# Patient Record
Sex: FEMALE | Race: WHITE | NOT HISPANIC OR LATINO | Employment: FULL TIME | URBAN - METROPOLITAN AREA
[De-identification: names, ages, dates, MRNs, and addresses within clinical notes are randomized per-mention and may not be internally consistent; named-entity substitution may affect disease eponyms.]

---

## 2017-01-11 ENCOUNTER — ALLSCRIPTS OFFICE VISIT (OUTPATIENT)
Dept: OTHER | Facility: OTHER | Age: 32
End: 2017-01-11

## 2017-04-05 ENCOUNTER — ALLSCRIPTS OFFICE VISIT (OUTPATIENT)
Dept: OTHER | Facility: OTHER | Age: 32
End: 2017-04-05

## 2017-06-26 ENCOUNTER — ALLSCRIPTS OFFICE VISIT (OUTPATIENT)
Dept: OTHER | Facility: OTHER | Age: 32
End: 2017-06-26

## 2017-08-01 ENCOUNTER — ALLSCRIPTS OFFICE VISIT (OUTPATIENT)
Dept: OTHER | Facility: OTHER | Age: 32
End: 2017-08-01

## 2018-01-11 NOTE — PROGRESS NOTES
Chief Complaint  The patient presents to the office for her injection of Depo-Provera  It was given IM in the right deltoid  Ul  [place] [id number]  Lot number [id number]  Expiration date 11/2018  Active Problems    1  Depo contraception (V25 49) (Z30 40)   2  Dysmenorrhea (625 3) (N94 6)   3  Encounter for routine gynecological examination (V72 31) (Z01 419)   4  Evaluation for contraceptive injection (V25 02) (Z30 013)   5  Hyperthyroidism (242 90) (E05 90)   6  Unsatisfactory cervical Papanicolaou smear (795 08) (R87 615)    Current Meds   1  All Day Allergy CAPS Recorded   2  Buffered Vitamin C 1000 MG Oral Capsule Recorded   3  CeleXA 40 MG Oral Tablet Recorded   4  MedroxyPROGESTERone Acetate 150 MG/ML Intramuscular Suspension; INJECT   EVERY 12 WEEKS AS DIRECTED; Therapy: 49JZP4280 to (Evaluate:29Bei1892)  Requested for: 08Pxk1449; Last   Rx:[id number] Ordered   5  MedroxyPROGESTERone Acetate 150 MG/ML Intramuscular Suspension; USE AS   DIRECTED; Therapy: 05LZW8149 to (Evaluate:23Ewu8447)  Requested for: 94NBQ3471; Last   Rx:[id number] Ordered   6  Synthroid 50 MCG Oral Tablet Recorded    Allergies    1  Erythromycin TABS    Plan  SocHx: Depo contraception, Dysmenorrhea    · MedroxyPROGESTERone Acetate 150 MG/ML Intramuscular Suspension    Future Appointments    Date/Time Provider Specialty Site   07/22/2016 11:20 AM Alva Gaines DO Obstetrics/Gynecology OB [facility]     Signatures   Electronically signed by : Elma Ortiz DO;  Apr 26 2016  3:39PM EST                       (Author)

## 2018-01-11 NOTE — PROGRESS NOTES
Chief Complaint  Pt presents today for her depo injection given IM in her L deltoid  Ul  [place] [id number] Lot [id number] Exp 07/2018  Active Problems    1  Depo contraception (V25 49) (Z30 49)   2  Dysmenorrhea (625 3) (N94 6)   3  Encounter for routine gynecological examination (V72 31) (Z01 419)   4  Evaluation for contraceptive injection (V25 02) (Z30 013)   5  Hyperthyroidism (242 90) (E05 90)   6  Unsatisfactory cervical Papanicolaou smear (795 08) (R87 615)    Current Meds   1  All Day Allergy CAPS Recorded   2  Buffered Vitamin C 1000 MG Oral Capsule Recorded   3  CeleXA 40 MG Oral Tablet Recorded   4  MedroxyPROGESTERone Acetate 150 MG/ML Intramuscular Suspension; INJECT   EVERY 12 WEEKS AS DIRECTED; Therapy: 26KOE8035 to (Evaluate:52Znc6074)  Requested for: 90Jjp2789; Last   Rx:[id number] Ordered   5  MedroxyPROGESTERone Acetate 150 MG/ML Intramuscular Suspension; USE AS   DIRECTED; Therapy: 28NQV5141 to (Evaluate:94Vgo0699)  Requested for: 20SCD2432; Last   Rx:[id number] Ordered   6  Synthroid 50 MCG Oral Tablet Recorded    Allergies    1   Erythromycin TABS    Plan  SocHx: Depo contraception    · MedroxyPROGESTERone Acetate 150 MG/ML Intramuscular Suspension    Future Appointments    Date/Time Provider Specialty Site   04/26/2016 03:30 PM [facility] Nurse Schedule  [facility]   07/22/2016 11:20 AM Marielle Carroll DO Obstetrics/Gynecology [facility] [address]     Signatures   Electronically signed by : Bonifacio Ramirez DO; Feb 2 2016  3:57PM EST                       (Author)

## 2018-01-12 NOTE — PROGRESS NOTES
Chief Complaint  Patient presents for depo injection  Given IM in left deltoid  Wait time waiver signed  RTO in 12 weeks  Active Problems    1  Contraceptive use (V25 40) (Z30 40)   2  Dysmenorrhea (625 3) (N94 6)   3  Encounter for cervical Pap smear with pelvic exam (V76 2,V72 31) (Z01 419)   4  Encounter for routine gynecological examination (V72 31) (Z01 419)   5  Evaluation for contraceptive injection (V25 02) (Z30 013)   6  Hyperthyroidism (242 90) (E05 90)   7  Unsatisfactory cervical Papanicolaou smear (795 08) (R87 615)    Current Meds   1  All Day Allergy CAPS Recorded   2  Buffered Vitamin C 1000 MG Oral Capsule Recorded   3  CeleXA 40 MG Oral Tablet Recorded   4  MedroxyPROGESTERone Acetate 150 MG/ML Intramuscular Suspension; inject every 12   weeks as directed; Therapy: 24Ixg5764 to (Evaluate:33Mnz0180)  Requested for: 01Krf9947; Last   Rx:[id number] Ordered   5  MedroxyPROGESTERone Acetate 150 MG/ML Intramuscular Suspension; USE AS   DIRECTED; Therapy: 77CVO3487 to (Evaluate:57Kcv0854)  Requested for: 99IYZ8793; Last   Rx:[id number] Ordered   6  Synthroid 50 MCG Oral Tablet Recorded    Allergies    1   Erythromycin TABS    Future Appointments    Date/Time Provider Specialty Site   01/11/2017 04:00 PM [facility] Nurse Schedule  [facility]   07/28/2017 11:20 AM Massiel Davis DO Obstetrics/Gynecology [facility]     Signatures   Electronically signed by : Kendrick Munoz, ; Oct 19 2016  4:15PM EST                       (Co-author)    Electronically signed by : Boom Patiño DO; Oct 19 2016  5:00PM EST                       (Author)

## 2018-01-12 NOTE — PROGRESS NOTES
Chief Complaint  Pt  presents for her depo injection given IM in her L deltoid  Waiver signed  [place] [id number]  Exp 12/2019  Lot# [id number]  Active Problems    1  Contraceptive use (V25 40) (Z30 40)   2  Dysmenorrhea (625 3) (N94 6)   3  Encounter for cervical Pap smear with pelvic exam (V76 2,V72 31) (Z01 419)   4  Encounter for routine gynecological examination (V72 31) (Z01 419)   5  Evaluation for contraceptive injection (V25 02) (Z30 013)   6  Hyperthyroidism (242 90) (E05 90)   7  Unsatisfactory cervical Papanicolaou smear (795 08) (R87 895)    Current Meds   1  All Day Allergy CAPS Recorded   2  Buffered Vitamin C 1000 MG Oral Capsule Recorded   3  CeleXA 40 MG Oral Tablet Recorded   4  MedroxyPROGESTERone Acetate 150 MG/ML Intramuscular Suspension; inject every 12   weeks as directed; Therapy: 37HUM8924 to (Evaluate:27Apr2018)  Requested for: 88AAZ4897; Last   Rx:[id number] Ordered   5  Synthroid 50 MCG Oral Tablet Recorded    Allergies    1   Erythromycin TABS    Plan  Contraceptive use    · MedroxyPROGESTERone Acetate 150 MG/ML Intramuscular Suspension   · MedroxyPROGESTERone Acetate 150 MG/ML Intramuscular Suspension    Future Appointments    Date/Time Provider Specialty Site   09/18/2017 03:45 PM [facility] Nurse Schedule  [facility]   08/01/2017 10:00 AM Jimmy Schmidt DO Obstetrics/Gynecology [facility]     Signatures   Electronically signed by : Sophie Granda DO; Jun 27 2017 10:03AM EST                       (Author)

## 2018-01-13 VITALS
SYSTOLIC BLOOD PRESSURE: 122 MMHG | DIASTOLIC BLOOD PRESSURE: 78 MMHG | BODY MASS INDEX: 44.13 KG/M2 | WEIGHT: 274.56 LBS | HEIGHT: 66 IN

## 2018-01-13 NOTE — PROGRESS NOTES
Chief Complaint  Pt presents today for her depo injection given IM in her R deltoid  Waiver signed  [place] [id number] Lot [id number] Exp 02/2019      Active Problems    1  Contraceptive use (V25 40) (Z30 40)   2  Dysmenorrhea (625 3) (N94 6)   3  Encounter for cervical Pap smear with pelvic exam (V76 2,V72 31) (Z01 419)   4  Encounter for routine gynecological examination (V72 31) (Z01 419)   5  Evaluation for contraceptive injection (V25 02) (Z30 013)   6  Hyperthyroidism (242 90) (E05 90)   7  Unsatisfactory cervical Papanicolaou smear (795 08) (R87 615)    Current Meds   1  All Day Allergy CAPS Recorded   2  Buffered Vitamin C 1000 MG Oral Capsule Recorded   3  CeleXA 40 MG Oral Tablet Recorded   4  MedroxyPROGESTERone Acetate 150 MG/ML Intramuscular Suspension; inject every 12   weeks as directed; Therapy: 63Zwg6915 to (Evaluate:10Vwb9475)  Requested for: 88Ikq5608; Last   Rx:[id number] Ordered   5  MedroxyPROGESTERone Acetate 150 MG/ML Intramuscular Suspension; USE AS   DIRECTED; Therapy: 13JTL3732 to (Evaluate:62Qkq5544)  Requested for: 34CLE2174; Last   Rx:[id number] Ordered   6  Synthroid 50 MCG Oral Tablet Recorded    Allergies    1  Erythromycin TABS    Plan  Contraceptive use    · MedroxyPROGESTERone Acetate 150 MG/ML Intramuscular Suspension    Future Appointments    Date/Time Provider Specialty Site   06/26/2017 10:15 AM [facility], Nurse Schedule  [facility]   07/28/2017 11:20 AM Eve Lawson DO Obstetrics/Gynecology OB [facility]     Signatures   Electronically signed by : Joshua Mendoza DO;  Apr 5 2017  5:26PM EST                       (Author)

## 2018-01-17 NOTE — PROGRESS NOTES
Chief Complaint  pt presents for a depo injection  Given in her left deltoid  Ul  [place] [place] [id number] LOT [id number] EXP 07/2019      Active Problems    1  Contraceptive use (V25 40) (Z30 40)   2  Dysmenorrhea (625 3) (N94 6)   3  Encounter for cervical Pap smear with pelvic exam (V76 2,V72 31) (Z01 419)   4  Encounter for routine gynecological examination (V72 31) (Z01 419)   5  Evaluation for contraceptive injection (V25 02) (Z30 013)   6  Hyperthyroidism (242 90) (E05 90)   7  Unsatisfactory cervical Papanicolaou smear (795 08) (R87 615)    Current Meds   1  All Day Allergy CAPS Recorded   2  Buffered Vitamin C 1000 MG Oral Capsule Recorded   3  CeleXA 40 MG Oral Tablet Recorded   4  MedroxyPROGESTERone Acetate 150 MG/ML Intramuscular Suspension; inject every 12   weeks as directed; Therapy: 93Bpu4656 to (Evaluate:15Bti8587)  Requested for: 46Jqf9175; Last   Rx:95Gzq4256 Ordered   5  MedroxyPROGESTERone Acetate 150 MG/ML Intramuscular Suspension; USE AS   DIRECTED; Therapy: 77VRG6738 to (Evaluate:22Qwd6263)  Requested for: 72NHU1654; Last   Rx:27Hxg9750 Ordered   6  Synthroid 50 MCG Oral Tablet Recorded    Allergies    1   Erythromycin TABS    Plan  Contraceptive use    · MedroxyPROGESTERone Acetate 150 MG/ML Intramuscular Suspension    Future Appointments    Date/Time Provider Specialty Site   04/05/2017 03:45 PM [facility] Nurse Schedule  [facility]   07/28/2017 11:20 AM Amy Mena DO Obstetrics/Gynecology [facility]     Signatures   Electronically signed by : Tushar Mckeon DO; Jan 11 2017  6:53PM EST                       (Author)

## 2018-04-21 DIAGNOSIS — Z30.42 ENCOUNTER FOR SURVEILLANCE OF INJECTABLE CONTRACEPTIVE: Primary | ICD-10-CM

## 2018-04-24 RX ORDER — MEDROXYPROGESTERONE ACETATE 150 MG/ML
INJECTION, SUSPENSION INTRAMUSCULAR
Qty: 1 ML | Refills: 3 | Status: SHIPPED | OUTPATIENT
Start: 2018-04-24 | End: 2018-08-02 | Stop reason: SDUPTHER

## 2018-05-08 ENCOUNTER — CLINICAL SUPPORT (OUTPATIENT)
Dept: OBGYN CLINIC | Facility: MEDICAL CENTER | Age: 33
End: 2018-05-08
Payer: COMMERCIAL

## 2018-05-08 DIAGNOSIS — Z30.42 ENCOUNTER FOR SURVEILLANCE OF INJECTABLE CONTRACEPTIVE: Primary | ICD-10-CM

## 2018-05-08 PROCEDURE — 96372 THER/PROPH/DIAG INJ SC/IM: CPT | Performed by: NURSE PRACTITIONER

## 2018-05-08 RX ORDER — MEDROXYPROGESTERONE ACETATE 150 MG/ML
150 INJECTION, SUSPENSION INTRAMUSCULAR ONCE
Status: COMPLETED | OUTPATIENT
Start: 2018-05-08 | End: 2018-05-08

## 2018-05-08 RX ADMIN — MEDROXYPROGESTERONE ACETATE 150 MG: 150 INJECTION, SUSPENSION INTRAMUSCULAR at 16:15

## 2018-08-02 ENCOUNTER — ANNUAL EXAM (OUTPATIENT)
Dept: OBGYN CLINIC | Facility: MEDICAL CENTER | Age: 33
End: 2018-08-02
Payer: COMMERCIAL

## 2018-08-02 VITALS
SYSTOLIC BLOOD PRESSURE: 134 MMHG | HEIGHT: 65 IN | DIASTOLIC BLOOD PRESSURE: 80 MMHG | WEIGHT: 286 LBS | BODY MASS INDEX: 47.65 KG/M2

## 2018-08-02 DIAGNOSIS — Z30.42 ENCOUNTER FOR SURVEILLANCE OF INJECTABLE CONTRACEPTIVE: ICD-10-CM

## 2018-08-02 DIAGNOSIS — Z01.419 ENCNTR FOR GYN EXAM (GENERAL) (ROUTINE) W/O ABN FINDINGS: Primary | ICD-10-CM

## 2018-08-02 PROBLEM — F41.9 ANXIETY: Status: ACTIVE | Noted: 2018-01-10

## 2018-08-02 PROBLEM — F32.A DEPRESSION: Status: ACTIVE | Noted: 2018-01-10

## 2018-08-02 PROCEDURE — S0612 ANNUAL GYNECOLOGICAL EXAMINA: HCPCS | Performed by: NURSE PRACTITIONER

## 2018-08-02 PROCEDURE — 96372 THER/PROPH/DIAG INJ SC/IM: CPT | Performed by: NURSE PRACTITIONER

## 2018-08-02 RX ORDER — MEDROXYPROGESTERONE ACETATE 150 MG/ML
150 INJECTION, SUSPENSION INTRAMUSCULAR ONCE
Status: COMPLETED | OUTPATIENT
Start: 2018-08-02 | End: 2018-08-02

## 2018-08-02 RX ORDER — MEDROXYPROGESTERONE ACETATE 150 MG/ML
150 INJECTION, SUSPENSION INTRAMUSCULAR
Qty: 1 ML | Refills: 3 | Status: SHIPPED | OUTPATIENT
Start: 2018-08-02 | End: 2019-08-05 | Stop reason: SDUPTHER

## 2018-08-02 RX ADMIN — MEDROXYPROGESTERONE ACETATE 150 MG: 150 INJECTION, SUSPENSION INTRAMUSCULAR at 14:48

## 2018-08-02 NOTE — PROGRESS NOTES
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  Skipper Natter with normal gynecologic exam     1   Routine well woman exam done today  2  Pap and HPV:  The patient's last pap and hpv was   It was normal     Pap and cotesting was not done today  Current ASCCP Guidelines reviewed  3   The following were reviewed in today's visit: breast self exam, family planning choices, adequate intake of calcium and vitamin D, exercise and healthy diet  4  rx for depo provera sent to the pharmacy for the year, Pt  Had injection today  CC:  Annual Gynecologic Examination    HPI: Joretta Schilder is a 29 y o  Skipper Natter who presents for annual gynecologic examination  She has the following concerns: none  Very happy on depo x's 5 years, wants to continue  Amenorrheic on it  Health Maintenance:    She wears her seatbelt routinely  She does not perform regular monthly self breast exams  She feels safe at home  History reviewed  No pertinent past medical history  Past Surgical History:   Procedure Laterality Date    BREAST SURGERY         Past OB/Gyn History:  OB History      Para Term  AB Living    0 0 0 0 0 0    SAB TAB Ectopic Multiple Live Births    0 0 0 0 0        Pt does not have menstrual issues  History of sexually transmitted infection: No   History of abnormal pap smears: No      Patient is currently sexually active  heterosexual   The current method of family planning is Depo-Provera injections  Family History   Problem Relation Age of Onset    No Known Problems Mother     Ovarian cancer Maternal Grandmother        Social History:  Social History     Social History    Marital status: /Civil Union     Spouse name: N/A    Number of children: N/A    Years of education: N/A     Occupational History    Not on file       Social History Main Topics    Smoking status: Never Smoker    Smokeless tobacco: Never Used    Alcohol use No    Drug use: No    Sexual activity: Yes     Birth control/ protection: Injection      Comment: contraceptive use     Other Topics Concern    Not on file     Social History Narrative    No narrative on file     Presently lives with spouse  Patient is   Patient is currently employed     Allergies   Allergen Reactions    Erythromycin Rash         Current Outpatient Prescriptions:     Ascorbic Acid Buffered (BUFFERED VITAMIN C) 1000 MG CAPS, Take by mouth, Disp: , Rfl:     Cetirizine HCl 10 MG CAPS, Take by mouth, Disp: , Rfl:     Cholecalciferol (VITAMIN D3) 27864 units CAPS, take 1 capsule by oral route  every week, Disp: , Rfl:     citalopram (CELEXA) 40 mg tablet, Take by mouth, Disp: , Rfl:     levothyroxine 137 mcg tablet, every 24 hours, Disp: , Rfl:     medroxyPROGESTERone (DEPO-PROVERA) 150 mg/mL injection, INJECT EVERY 12 WEEKS AS DIRECTED, Disp: 1 mL, Rfl: 3    Multiple Vitamins-Minerals (MULTIVITAMIN ADULTS PO), Take 1 tablet by mouth, Disp: , Rfl:   No current facility-administered medications for this visit  Review of Systems  Constitutional :no fever, feels well, no tiredness, no recent weight gain or loss  ENT: no ear ache, no loss of hearing, no nosebleeds or nasal discharge, no sore throat or hoarseness  Cardiovascular: no complaints of slow or fast heart beat, no chest pain, no palpitations, no leg claudication or lower extremity edema  Respiratory: no complaints of shortness of shortness of breath, no SANCHEZ  Breasts:no complaints of breast pain, breast lump, or nipple discharge  Gastrointestinal: no complaints of abdominal pain, constipation, nausea, vomiting, or diarrhea or bloody stools  Genitourinary : no complaints of dysuria, incontinence, pelvic pain, no dysmenorrhea, vaginal discharge or abnormal vaginal bleeding and as noted in HPI  Musculoskeletal: no complaints of arthralgia, no myalgia, no joint swelling or stiffness, no limb pain or swelling    Integumentary: no complaints of skin rash or lesion, itching or dry skin  Neurological: no complaints of headache, no confusion, no numbness or tingling, no dizziness or fainting    Objective      /80   Ht 5' 5" (1 651 m)   Wt 130 kg (286 lb)   BMI 47 59 kg/m²   General:   appears stated age, cooperative, alert normal mood and affect   Neck: normal, supple,trachea midline, no masses   Heart: regular rate and rhythm, S1, S2 normal, no murmur, click, rub or gallop   Lungs: clear to auscultation bilaterally   Breasts: normal appearance, no masses or tenderness   Abdomen: soft, non-tender, without masses or organomegaly   Vulva: normal   Vagina: normal vagina   Urethra: normal   Cervix: Normal, no discharge  Uterus: normal size, contour, position, consistency, mobility, non-tender   Adnexa: normal adnexa   Lymphatic palpation of lymph nodes in neck, axilla, groin and/or other locations: no lymphadenopathy or masses noted   Skin normal skin turgor and no rashes     Psychiatric orientation to person, place, and time: normal  mood and affect: normal

## 2018-08-03 DIAGNOSIS — E03.9 HYPOTHYROIDISM, UNSPECIFIED TYPE: ICD-10-CM

## 2018-08-03 DIAGNOSIS — F41.9 ANXIETY: Primary | ICD-10-CM

## 2018-08-03 RX ORDER — LEVOTHYROXINE SODIUM 137 UG/1
137 TABLET ORAL EVERY 24 HOURS
Qty: 90 TABLET | Refills: 1 | Status: SHIPPED | OUTPATIENT
Start: 2018-08-03 | End: 2019-01-03 | Stop reason: SDUPTHER

## 2018-08-03 RX ORDER — CITALOPRAM 40 MG/1
40 TABLET ORAL DAILY
Qty: 90 TABLET | Refills: 1 | Status: CANCELLED | OUTPATIENT
Start: 2018-08-03

## 2018-08-08 DIAGNOSIS — F32.A DEPRESSION, UNSPECIFIED DEPRESSION TYPE: Primary | ICD-10-CM

## 2018-08-08 RX ORDER — CITALOPRAM 40 MG/1
40 TABLET ORAL DAILY
Qty: 30 TABLET | Refills: 0 | Status: SHIPPED | OUTPATIENT
Start: 2018-08-08 | End: 2018-08-17 | Stop reason: SDUPTHER

## 2018-08-17 ENCOUNTER — APPOINTMENT (OUTPATIENT)
Dept: RADIOLOGY | Age: 33
End: 2018-08-17
Payer: COMMERCIAL

## 2018-08-17 ENCOUNTER — TRANSCRIBE ORDERS (OUTPATIENT)
Dept: ADMINISTRATIVE | Age: 33
End: 2018-08-17

## 2018-08-17 DIAGNOSIS — Z92.89 HISTORY OF POSITIVE PPD: Primary | ICD-10-CM

## 2018-08-17 DIAGNOSIS — Z92.89 HISTORY OF POSITIVE PPD: ICD-10-CM

## 2018-08-17 DIAGNOSIS — F32.A DEPRESSION, UNSPECIFIED DEPRESSION TYPE: ICD-10-CM

## 2018-08-17 DIAGNOSIS — Z22.7 INACTIVE TUBERCULOSIS: Primary | ICD-10-CM

## 2018-08-17 PROBLEM — E03.9 HYPOTHYROIDISM: Status: ACTIVE | Noted: 2018-01-10

## 2018-08-17 PROBLEM — E66.9 OBESITY: Status: ACTIVE | Noted: 2018-01-10

## 2018-08-17 PROCEDURE — 71046 X-RAY EXAM CHEST 2 VIEWS: CPT

## 2018-08-17 RX ORDER — CITALOPRAM 40 MG/1
40 TABLET ORAL DAILY
Qty: 90 TABLET | Refills: 0 | Status: SHIPPED | OUTPATIENT
Start: 2018-08-17 | End: 2018-10-31 | Stop reason: SDUPTHER

## 2018-10-25 ENCOUNTER — CLINICAL SUPPORT (OUTPATIENT)
Dept: OBGYN CLINIC | Facility: MEDICAL CENTER | Age: 33
End: 2018-10-25
Payer: COMMERCIAL

## 2018-10-25 DIAGNOSIS — Z30.42 ENCOUNTER FOR SURVEILLANCE OF INJECTABLE CONTRACEPTIVE: Primary | ICD-10-CM

## 2018-10-25 PROCEDURE — 96372 THER/PROPH/DIAG INJ SC/IM: CPT | Performed by: OBSTETRICS & GYNECOLOGY

## 2018-10-25 RX ORDER — MEDROXYPROGESTERONE ACETATE 150 MG/ML
150 INJECTION, SUSPENSION INTRAMUSCULAR ONCE
Status: COMPLETED | OUTPATIENT
Start: 2018-10-25 | End: 2018-10-25

## 2018-10-25 RX ADMIN — MEDROXYPROGESTERONE ACETATE 150 MG: 150 INJECTION, SUSPENSION INTRAMUSCULAR at 16:20

## 2018-10-25 NOTE — PROGRESS NOTES
Pt presents for depo shot  Administered in R deltoid  Waiver signed  Pt supplied         [address] [id number]  EXP 02/2021

## 2018-10-31 DIAGNOSIS — F32.A DEPRESSION, UNSPECIFIED DEPRESSION TYPE: ICD-10-CM

## 2018-10-31 RX ORDER — CITALOPRAM 40 MG/1
TABLET ORAL
Qty: 90 TABLET | Refills: 1 | Status: SHIPPED | OUTPATIENT
Start: 2018-10-31 | End: 2019-04-29 | Stop reason: SDUPTHER

## 2019-01-03 DIAGNOSIS — E03.9 HYPOTHYROIDISM, UNSPECIFIED TYPE: ICD-10-CM

## 2019-01-03 RX ORDER — LEVOTHYROXINE SODIUM 137 UG/1
TABLET ORAL
Qty: 90 TABLET | Refills: 0 | Status: SHIPPED | OUTPATIENT
Start: 2019-01-03 | End: 2019-04-03 | Stop reason: SDUPTHER

## 2019-01-16 RX ORDER — MEDROXYPROGESTERONE ACETATE 150 MG/ML
150 INJECTION, SUSPENSION INTRAMUSCULAR
Status: CANCELLED | OUTPATIENT
Start: 2019-01-21 | End: 2019-07-19

## 2019-01-21 ENCOUNTER — CLINICAL SUPPORT (OUTPATIENT)
Dept: OBGYN CLINIC | Facility: MEDICAL CENTER | Age: 34
End: 2019-01-21
Payer: COMMERCIAL

## 2019-01-21 DIAGNOSIS — Z30.42 ENCOUNTER FOR DEPO-PROVERA CONTRACEPTION: Primary | ICD-10-CM

## 2019-01-21 PROCEDURE — 96372 THER/PROPH/DIAG INJ SC/IM: CPT | Performed by: OBSTETRICS & GYNECOLOGY

## 2019-01-21 RX ORDER — MEDROXYPROGESTERONE ACETATE 150 MG/ML
150 INJECTION, SUSPENSION INTRAMUSCULAR
Status: COMPLETED | OUTPATIENT
Start: 2019-01-21 | End: 2019-04-19

## 2019-01-21 RX ADMIN — MEDROXYPROGESTERONE ACETATE 150 MG: 150 INJECTION, SUSPENSION INTRAMUSCULAR at 12:00

## 2019-01-21 NOTE — PROGRESS NOTES
Here for depo  Patient supplied    Waiver signed  NDC# [address]-7  Lot# [id number]  Exp 5/2021  Given Im in left deltoid

## 2019-04-03 DIAGNOSIS — E03.9 HYPOTHYROIDISM, UNSPECIFIED TYPE: ICD-10-CM

## 2019-04-03 RX ORDER — LEVOTHYROXINE SODIUM 137 UG/1
TABLET ORAL
Qty: 90 TABLET | Refills: 0 | Status: SHIPPED | OUTPATIENT
Start: 2019-04-03 | End: 2019-06-18 | Stop reason: SDUPTHER

## 2019-04-19 ENCOUNTER — CLINICAL SUPPORT (OUTPATIENT)
Dept: OBGYN CLINIC | Facility: MEDICAL CENTER | Age: 34
End: 2019-04-19
Payer: COMMERCIAL

## 2019-04-19 DIAGNOSIS — Z30.42 ENCOUNTER FOR SURVEILLANCE OF INJECTABLE CONTRACEPTIVE: ICD-10-CM

## 2019-04-19 PROCEDURE — 96372 THER/PROPH/DIAG INJ SC/IM: CPT | Performed by: OBSTETRICS & GYNECOLOGY

## 2019-04-19 RX ADMIN — MEDROXYPROGESTERONE ACETATE 150 MG: 150 INJECTION, SUSPENSION INTRAMUSCULAR at 11:59

## 2019-04-29 DIAGNOSIS — F32.A DEPRESSION, UNSPECIFIED DEPRESSION TYPE: ICD-10-CM

## 2019-04-30 RX ORDER — CITALOPRAM 40 MG/1
TABLET ORAL
Qty: 90 TABLET | Refills: 0 | Status: SHIPPED | OUTPATIENT
Start: 2019-04-30 | End: 2019-06-18 | Stop reason: SDUPTHER

## 2019-06-10 ENCOUNTER — TELEPHONE (OUTPATIENT)
Dept: FAMILY MEDICINE CLINIC | Facility: CLINIC | Age: 34
End: 2019-06-10

## 2019-06-10 DIAGNOSIS — Z00.00 WELL ADULT EXAM: ICD-10-CM

## 2019-06-10 DIAGNOSIS — E03.9 HYPOTHYROIDISM, UNSPECIFIED TYPE: Primary | ICD-10-CM

## 2019-06-10 DIAGNOSIS — J30.2 SEASONAL ALLERGIES: ICD-10-CM

## 2019-06-11 ENCOUNTER — TELEPHONE (OUTPATIENT)
Dept: FAMILY MEDICINE CLINIC | Facility: CLINIC | Age: 34
End: 2019-06-11

## 2019-06-11 DIAGNOSIS — J30.2 SEASONAL ALLERGIES: ICD-10-CM

## 2019-06-11 RX ORDER — LEVOCETIRIZINE DIHYDROCHLORIDE 5 MG/1
5 TABLET, FILM COATED ORAL EVERY EVENING
Qty: 30 TABLET | Refills: 5 | Status: SHIPPED | OUTPATIENT
Start: 2019-06-11 | End: 2019-08-05 | Stop reason: ALTCHOICE

## 2019-06-11 RX ORDER — LEVOCETIRIZINE DIHYDROCHLORIDE 5 MG/1
5 TABLET, FILM COATED ORAL EVERY EVENING
Qty: 30 TABLET | Refills: 5 | Status: SHIPPED | OUTPATIENT
Start: 2019-06-11 | End: 2019-06-11 | Stop reason: SDUPTHER

## 2019-06-12 ENCOUNTER — APPOINTMENT (OUTPATIENT)
Dept: LAB | Facility: OTHER | Age: 34
End: 2019-06-12
Payer: COMMERCIAL

## 2019-06-12 ENCOUNTER — TRANSCRIBE ORDERS (OUTPATIENT)
Dept: LAB | Facility: OTHER | Age: 34
End: 2019-06-12

## 2019-06-12 DIAGNOSIS — E03.9 HYPOTHYROIDISM, UNSPECIFIED TYPE: ICD-10-CM

## 2019-06-12 DIAGNOSIS — Z00.00 WELL ADULT EXAM: ICD-10-CM

## 2019-06-12 LAB
ALBUMIN SERPL BCP-MCNC: 3.9 G/DL (ref 3.5–5)
ALP SERPL-CCNC: 89 U/L (ref 46–116)
ALT SERPL W P-5'-P-CCNC: 24 U/L (ref 12–78)
ANION GAP SERPL CALCULATED.3IONS-SCNC: 7 MMOL/L (ref 4–13)
AST SERPL W P-5'-P-CCNC: 11 U/L (ref 5–45)
BASOPHILS # BLD AUTO: 0.07 THOUSANDS/ΜL (ref 0–0.1)
BASOPHILS NFR BLD AUTO: 1 % (ref 0–1)
BILIRUB SERPL-MCNC: 0.51 MG/DL (ref 0.2–1)
BUN SERPL-MCNC: 16 MG/DL (ref 5–25)
CALCIUM SERPL-MCNC: 9.5 MG/DL (ref 8.3–10.1)
CHLORIDE SERPL-SCNC: 106 MMOL/L (ref 100–108)
CHOLEST SERPL-MCNC: 206 MG/DL (ref 50–200)
CO2 SERPL-SCNC: 24 MMOL/L (ref 21–32)
CREAT SERPL-MCNC: 0.89 MG/DL (ref 0.6–1.3)
EOSINOPHIL # BLD AUTO: 0.28 THOUSAND/ΜL (ref 0–0.61)
EOSINOPHIL NFR BLD AUTO: 3 % (ref 0–6)
ERYTHROCYTE [DISTWIDTH] IN BLOOD BY AUTOMATED COUNT: 12.9 % (ref 11.6–15.1)
GFR SERPL CREATININE-BSD FRML MDRD: 85 ML/MIN/1.73SQ M
GLUCOSE P FAST SERPL-MCNC: 92 MG/DL (ref 65–99)
HCT VFR BLD AUTO: 44.1 % (ref 34.8–46.1)
HDLC SERPL-MCNC: 34 MG/DL (ref 40–60)
HGB BLD-MCNC: 14.7 G/DL (ref 11.5–15.4)
IMM GRANULOCYTES # BLD AUTO: 0.04 THOUSAND/UL (ref 0–0.2)
IMM GRANULOCYTES NFR BLD AUTO: 1 % (ref 0–2)
LDLC SERPL CALC-MCNC: 132 MG/DL (ref 0–100)
LYMPHOCYTES # BLD AUTO: 2.97 THOUSANDS/ΜL (ref 0.6–4.47)
LYMPHOCYTES NFR BLD AUTO: 34 % (ref 14–44)
MCH RBC QN AUTO: 29.3 PG (ref 26.8–34.3)
MCHC RBC AUTO-ENTMCNC: 33.3 G/DL (ref 31.4–37.4)
MCV RBC AUTO: 88 FL (ref 82–98)
MONOCYTES # BLD AUTO: 0.43 THOUSAND/ΜL (ref 0.17–1.22)
MONOCYTES NFR BLD AUTO: 5 % (ref 4–12)
NEUTROPHILS # BLD AUTO: 4.97 THOUSANDS/ΜL (ref 1.85–7.62)
NEUTS SEG NFR BLD AUTO: 56 % (ref 43–75)
NONHDLC SERPL-MCNC: 172 MG/DL
NRBC BLD AUTO-RTO: 0 /100 WBCS
PLATELET # BLD AUTO: 380 THOUSANDS/UL (ref 149–390)
PMV BLD AUTO: 10.9 FL (ref 8.9–12.7)
POTASSIUM SERPL-SCNC: 4 MMOL/L (ref 3.5–5.3)
PROT SERPL-MCNC: 7.8 G/DL (ref 6.4–8.2)
RBC # BLD AUTO: 5.02 MILLION/UL (ref 3.81–5.12)
SODIUM SERPL-SCNC: 137 MMOL/L (ref 136–145)
TRIGL SERPL-MCNC: 201 MG/DL
TSH SERPL DL<=0.05 MIU/L-ACNC: 1.8 UIU/ML (ref 0.36–3.74)
WBC # BLD AUTO: 8.76 THOUSAND/UL (ref 4.31–10.16)

## 2019-06-12 PROCEDURE — 80053 COMPREHEN METABOLIC PANEL: CPT

## 2019-06-12 PROCEDURE — 84443 ASSAY THYROID STIM HORMONE: CPT

## 2019-06-12 PROCEDURE — 85025 COMPLETE CBC W/AUTO DIFF WBC: CPT

## 2019-06-12 PROCEDURE — 36415 COLL VENOUS BLD VENIPUNCTURE: CPT

## 2019-06-12 PROCEDURE — 80061 LIPID PANEL: CPT

## 2019-06-17 ENCOUNTER — TELEPHONE (OUTPATIENT)
Dept: FAMILY MEDICINE CLINIC | Facility: CLINIC | Age: 34
End: 2019-06-17

## 2019-06-18 ENCOUNTER — OFFICE VISIT (OUTPATIENT)
Dept: FAMILY MEDICINE CLINIC | Facility: CLINIC | Age: 34
End: 2019-06-18
Payer: COMMERCIAL

## 2019-06-18 VITALS
HEIGHT: 66 IN | WEIGHT: 293 LBS | BODY MASS INDEX: 47.09 KG/M2 | DIASTOLIC BLOOD PRESSURE: 90 MMHG | RESPIRATION RATE: 18 BRPM | SYSTOLIC BLOOD PRESSURE: 110 MMHG | TEMPERATURE: 98.4 F | HEART RATE: 60 BPM

## 2019-06-18 DIAGNOSIS — J30.2 SEASONAL ALLERGIES: Primary | ICD-10-CM

## 2019-06-18 DIAGNOSIS — E66.01 CLASS 3 SEVERE OBESITY DUE TO EXCESS CALORIES WITHOUT SERIOUS COMORBIDITY WITH BODY MASS INDEX (BMI) OF 45.0 TO 49.9 IN ADULT (HCC): ICD-10-CM

## 2019-06-18 DIAGNOSIS — F32.A DEPRESSION, UNSPECIFIED DEPRESSION TYPE: ICD-10-CM

## 2019-06-18 DIAGNOSIS — E03.9 HYPOTHYROIDISM, UNSPECIFIED TYPE: ICD-10-CM

## 2019-06-18 DIAGNOSIS — Z23 NEED FOR VACCINATION: ICD-10-CM

## 2019-06-18 PROCEDURE — 3008F BODY MASS INDEX DOCD: CPT | Performed by: NURSE PRACTITIONER

## 2019-06-18 PROCEDURE — 1036F TOBACCO NON-USER: CPT | Performed by: NURSE PRACTITIONER

## 2019-06-18 PROCEDURE — 99213 OFFICE O/P EST LOW 20 MIN: CPT | Performed by: NURSE PRACTITIONER

## 2019-06-18 RX ORDER — FLUTICASONE PROPIONATE 50 MCG
2 SPRAY, SUSPENSION (ML) NASAL DAILY
Qty: 1 BOTTLE | Refills: 3 | Status: SHIPPED | OUTPATIENT
Start: 2019-06-18 | End: 2020-05-12 | Stop reason: ALTCHOICE

## 2019-06-18 RX ORDER — LEVOTHYROXINE SODIUM 137 UG/1
137 TABLET ORAL DAILY
Qty: 90 TABLET | Refills: 1 | Status: SHIPPED | OUTPATIENT
Start: 2019-06-18 | End: 2019-06-18 | Stop reason: SDUPTHER

## 2019-06-18 RX ORDER — MONTELUKAST SODIUM 10 MG/1
10 TABLET ORAL
Qty: 90 TABLET | Refills: 3 | Status: SHIPPED | OUTPATIENT
Start: 2019-06-18 | End: 2020-06-23

## 2019-06-18 RX ORDER — CITALOPRAM 40 MG/1
40 TABLET ORAL DAILY
Qty: 90 TABLET | Refills: 3 | Status: SHIPPED | OUTPATIENT
Start: 2019-06-18 | End: 2020-01-24 | Stop reason: SDUPTHER

## 2019-06-18 RX ORDER — MONTELUKAST SODIUM 10 MG/1
10 TABLET ORAL
Qty: 90 TABLET | Refills: 3 | Status: SHIPPED | OUTPATIENT
Start: 2019-06-18 | End: 2019-06-18 | Stop reason: SDUPTHER

## 2019-06-18 RX ORDER — LEVOTHYROXINE SODIUM 137 UG/1
137 TABLET ORAL DAILY
Qty: 90 TABLET | Refills: 3 | Status: SHIPPED | OUTPATIENT
Start: 2019-06-18 | End: 2019-07-02 | Stop reason: SDUPTHER

## 2019-06-18 RX ORDER — CITALOPRAM 40 MG/1
40 TABLET ORAL DAILY
Qty: 90 TABLET | Refills: 0 | Status: SHIPPED | OUTPATIENT
Start: 2019-06-18 | End: 2019-06-18 | Stop reason: SDUPTHER

## 2019-06-18 RX ORDER — UREA 40 %
CREAM (GRAM) TOPICAL
COMMUNITY
End: 2019-06-18 | Stop reason: ALTCHOICE

## 2019-06-18 RX ORDER — FLUTICASONE PROPIONATE 50 MCG
2 SPRAY, SUSPENSION (ML) NASAL DAILY
Qty: 1 BOTTLE | Refills: 3 | Status: SHIPPED | OUTPATIENT
Start: 2019-06-18 | End: 2019-06-18 | Stop reason: SDUPTHER

## 2019-07-02 DIAGNOSIS — E03.9 HYPOTHYROIDISM, UNSPECIFIED TYPE: ICD-10-CM

## 2019-07-02 RX ORDER — LEVOTHYROXINE SODIUM 137 UG/1
TABLET ORAL
Qty: 90 TABLET | Refills: 0 | Status: SHIPPED | OUTPATIENT
Start: 2019-07-02 | End: 2020-01-24 | Stop reason: SDUPTHER

## 2019-07-03 RX ORDER — MEDROXYPROGESTERONE ACETATE 150 MG/ML
150 INJECTION, SUSPENSION INTRAMUSCULAR
Status: CANCELLED | OUTPATIENT
Start: 2019-07-08 | End: 2020-01-03

## 2019-07-08 ENCOUNTER — CLINICAL SUPPORT (OUTPATIENT)
Dept: OBGYN CLINIC | Facility: MEDICAL CENTER | Age: 34
End: 2019-07-08
Payer: COMMERCIAL

## 2019-07-08 DIAGNOSIS — Z30.42 ENCOUNTER FOR SURVEILLANCE OF INJECTABLE CONTRACEPTIVE: Primary | ICD-10-CM

## 2019-07-08 PROCEDURE — 96372 THER/PROPH/DIAG INJ SC/IM: CPT | Performed by: OBSTETRICS & GYNECOLOGY

## 2019-07-08 RX ORDER — MEDROXYPROGESTERONE ACETATE 150 MG/ML
150 INJECTION, SUSPENSION INTRAMUSCULAR
Status: COMPLETED | OUTPATIENT
Start: 2019-07-08 | End: 2019-10-01

## 2019-07-08 RX ADMIN — MEDROXYPROGESTERONE ACETATE 150 MG: 150 INJECTION, SUSPENSION INTRAMUSCULAR at 11:04

## 2019-07-08 NOTE — PROGRESS NOTES
Here for depo  Patient supplied  Waiver signed    Given IM in right deltoid  NDC# [address]-4  EV#[id number]  Exp 07/2021

## 2019-08-05 ENCOUNTER — ANNUAL EXAM (OUTPATIENT)
Dept: OBGYN CLINIC | Facility: MEDICAL CENTER | Age: 34
End: 2019-08-05
Payer: COMMERCIAL

## 2019-08-05 VITALS
BODY MASS INDEX: 47.48 KG/M2 | WEIGHT: 285 LBS | SYSTOLIC BLOOD PRESSURE: 134 MMHG | DIASTOLIC BLOOD PRESSURE: 88 MMHG | HEIGHT: 65 IN

## 2019-08-05 DIAGNOSIS — Z30.42 ENCOUNTER FOR SURVEILLANCE OF INJECTABLE CONTRACEPTIVE: Primary | ICD-10-CM

## 2019-08-05 DIAGNOSIS — Z01.419 ENCNTR FOR GYN EXAM (GENERAL) (ROUTINE) W/O ABN FINDINGS: ICD-10-CM

## 2019-08-05 PROCEDURE — S0612 ANNUAL GYNECOLOGICAL EXAMINA: HCPCS | Performed by: NURSE PRACTITIONER

## 2019-08-05 RX ORDER — MEDROXYPROGESTERONE ACETATE 150 MG/ML
150 INJECTION, SUSPENSION INTRAMUSCULAR
Qty: 1 ML | Refills: 3 | Status: SHIPPED | OUTPATIENT
Start: 2019-08-05 | End: 2020-09-24

## 2019-08-05 NOTE — PROGRESS NOTES
ASSESSMENT & PLAN: Servando Liriano is a 36 y o  [place] with normal gynecologic exam     1   Routine well woman exam done today  2  Pap and HPV:  The patient's last pap and hpv was   It was normal     Pap and cotesting was not done today  Current ASCCP Guidelines reviewed  3   The following were reviewed in today's visit: breast self exam, family planning choices, adequate intake of calcium and vitamin D, exercise and healthy diet  4  rx for depo for the year sent to pharmacy  RTO one year for annual exam      CC:  Annual Gynecologic Examination    HPI: Servando Liriano is a 36 y o  [place] who presents for annual gynecologic examination  She has the following concerns:  None, very happy on depo, no bleeding and wants to continue same  States is on the [employer] and has lost 8# in 2 weeks, also takes long walks with her dogs  No pregnancy plans in foreseeable future  Advised should go off depo 1 year in advance of trying to conceive and use other quickly reversible method  Health Maintenance:    She wears her seatbelt routinely  She does not perform regular monthly self breast exams  She feels safe at home  History reviewed  No pertinent past medical history  Past Surgical History:   Procedure Laterality Date    BREAST SURGERY      reduction    TONSILLECTOMY         Past OB/Gyn History:  OB History        0    Para   0    Term   0       0    AB   0    Living   0       SAB   0    TAB   0    Ectopic   0    Multiple   0    Live Births   0               Pt does not have menstrual issues  History of sexually transmitted infection: No   History of abnormal pap smears: No      Patient is currently sexually active  heterosexual   The current method of family planning is Depo-Provera injections      Family History   Problem Relation Age of Onset    No Known Problems Mother     Ovarian cancer Maternal Grandmother        Social History:  Social History     Socioeconomic History    Marital status: /Civil Union     Spouse name: Not on file    Number of children: Not on file    Years of education: Not on file    Highest education level: Not on file   Occupational History    Not on file   Social Needs    Financial resource strain: Not on file    Food insecurity:     Worry: Not on file     Inability: Not on file    Transportation needs:     Medical: Not on file     Non-medical: Not on file   Tobacco Use    Smoking status: Never Smoker    Smokeless tobacco: Never Used   Substance and Sexual Activity    Alcohol use: Yes     Frequency: Monthly or less    Drug use: No    Sexual activity: Yes     Partners: Male     Birth control/protection: Injection     Comment:    Lifestyle    Physical activity:     Days per week: Not on file     Minutes per session: Not on file    Stress: Not on file   Relationships    Social connections:     Talks on phone: Not on file     Gets together: Not on file     Attends Mosque service: Not on file     Active member of club or organization: Not on file     Attends meetings of clubs or organizations: Not on file     Relationship status: Not on file    Intimate partner violence:     Fear of current or ex partner: Not on file     Emotionally abused: Not on file     Physically abused: Not on file     Forced sexual activity: Not on file   Other Topics Concern    Not on file   Social History Narrative    Not on file     Presently lives with spouse  Patient is   Patient is currently employed as a , trying to get a full time position      Allergies   Allergen Reactions    Erythromycin Rash         Current Outpatient Medications:     citalopram (CeleXA) 40 mg tablet, Take 1 tablet (40 mg total) by mouth daily, Disp: 90 tablet, Rfl: 3    fluticasone (FLONASE) 50 mcg/act nasal spray, 2 sprays into each nostril daily, Disp: 1 Bottle, Rfl: 3    levothyroxine 137 mcg tablet, TAKE 1 TABLET EVERY 24 HOURS, Disp: 90 tablet, Rfl: 0    medroxyPROGESTERone (DEPO-PROVERA) 150 mg/mL injection, Inject 1 mL (150 mg total) into a muscle every 3 (three) months As directed, Disp: 1 mL, Rfl: 3    montelukast (SINGULAIR) 10 mg tablet, Take 1 tablet (10 mg total) by mouth daily at bedtime, Disp: 90 tablet, Rfl: 3    Cholecalciferol (VITAMIN D3) 08914 units CAPS, take 1 capsule by oral route  every week, Disp: , Rfl:     Current Facility-Administered Medications:     medroxyPROGESTERone (DEPO-PROVERA) IM injection 150 mg, 150 mg, Intramuscular, Q3 Months, Lobo Lang MD, 150 mg at 07/08/19 1104      Review of Systems  Constitutional :no fever, feels well, no tiredness, no recent weight gain or loss  ENT: no ear ache, no loss of hearing, no nosebleeds or nasal discharge, no sore throat or hoarseness  Cardiovascular: no complaints of slow or fast heart beat, no chest pain, no palpitations, no leg claudication or lower extremity edema  Respiratory: no complaints of shortness of shortness of breath, no SANCHEZ  Breasts:no complaints of breast pain, breast lump, or nipple discharge  Gastrointestinal: no complaints of abdominal pain, constipation, nausea, vomiting, or diarrhea or bloody stools  Genitourinary : no complaints of dysuria, incontinence, pelvic pain, no dysmenorrhea, vaginal discharge or abnormal vaginal bleeding and as noted in HPI  Musculoskeletal: no complaints of arthralgia, no myalgia, no joint swelling or stiffness, no limb pain or swelling    Integumentary: no complaints of skin rash or lesion, itching or dry skin  Neurological: no complaints of headache, no confusion, no numbness or tingling, no dizziness or fainting    Objective      /88   Ht 5' 5" (1 651 m)   Wt 129 kg (285 lb)   BMI 47 43 kg/m²   General:   appears stated age, cooperative, alert normal mood and affect   Neck: normal, supple,trachea midline, no masses   Heart: regular rate and rhythm, S1, S2 normal, no murmur, click, rub or gallop   Lungs: clear to auscultation bilaterally   Breasts: normal appearance, no masses or tenderness s/p breast reduction surgery  Abdomen: soft, non-tender, without masses or organomegaly   Vulva: normal female genitalia   Vagina: normal vagina   Urethra: normal   Cervix: Normal, no discharge  Uterus: normal size, contour, position, consistency, mobility, non-tender   Adnexa: no mass, fullness, tenderness   Lymphatic palpation of lymph nodes in neck, axilla, groin and/or other locations: no lymphadenopathy or masses noted   Skin normal skin turgor and no rashes     Psychiatric orientation to person, place, and time: normal  mood and affect: normal

## 2019-09-06 DIAGNOSIS — Z30.42 ENCOUNTER FOR SURVEILLANCE OF INJECTABLE CONTRACEPTIVE: ICD-10-CM

## 2019-09-06 RX ORDER — MEDROXYPROGESTERONE ACETATE 150 MG/ML
INJECTION, SUSPENSION INTRAMUSCULAR
Qty: 1 ML | Refills: 3 | Status: SHIPPED | OUTPATIENT
Start: 2019-09-06 | End: 2020-03-03 | Stop reason: SDUPTHER

## 2019-10-01 ENCOUNTER — CLINICAL SUPPORT (OUTPATIENT)
Dept: OBGYN CLINIC | Facility: MEDICAL CENTER | Age: 34
End: 2019-10-01
Payer: COMMERCIAL

## 2019-10-01 DIAGNOSIS — Z30.42 ENCOUNTER FOR SURVEILLANCE OF INJECTABLE CONTRACEPTIVE: ICD-10-CM

## 2019-10-01 PROCEDURE — 96372 THER/PROPH/DIAG INJ SC/IM: CPT | Performed by: OBSTETRICS & GYNECOLOGY

## 2019-10-01 RX ADMIN — MEDROXYPROGESTERONE ACETATE 150 MG: 150 INJECTION, SUSPENSION INTRAMUSCULAR at 16:11

## 2019-10-01 NOTE — PROGRESS NOTES
Here for depo    Patient supplied  Given IM in left deltoid  Waiver signed  NDC# [address]-2  Lot# [id number]  Exp 8/2021

## 2019-10-07 ENCOUNTER — OFFICE VISIT (OUTPATIENT)
Dept: FAMILY MEDICINE CLINIC | Facility: CLINIC | Age: 34
End: 2019-10-07
Payer: COMMERCIAL

## 2019-10-07 VITALS
TEMPERATURE: 97.5 F | HEIGHT: 66 IN | BODY MASS INDEX: 46.27 KG/M2 | SYSTOLIC BLOOD PRESSURE: 148 MMHG | OXYGEN SATURATION: 97 % | DIASTOLIC BLOOD PRESSURE: 88 MMHG | WEIGHT: 287.9 LBS | RESPIRATION RATE: 18 BRPM | HEART RATE: 86 BPM

## 2019-10-07 DIAGNOSIS — H70.91 MASTOIDITIS OF RIGHT SIDE: Primary | ICD-10-CM

## 2019-10-07 PROCEDURE — 87147 CULTURE TYPE IMMUNOLOGIC: CPT | Performed by: NURSE PRACTITIONER

## 2019-10-07 PROCEDURE — 87070 CULTURE OTHR SPECIMN AEROBIC: CPT | Performed by: NURSE PRACTITIONER

## 2019-10-07 PROCEDURE — 87430 STREP A AG IA: CPT | Performed by: NURSE PRACTITIONER

## 2019-10-07 PROCEDURE — 3008F BODY MASS INDEX DOCD: CPT | Performed by: NURSE PRACTITIONER

## 2019-10-07 PROCEDURE — 99213 OFFICE O/P EST LOW 20 MIN: CPT | Performed by: NURSE PRACTITIONER

## 2019-10-07 RX ORDER — AMOXICILLIN 500 MG/1
500 CAPSULE ORAL EVERY 8 HOURS SCHEDULED
Qty: 30 CAPSULE | Refills: 0 | Status: SHIPPED | OUTPATIENT
Start: 2019-10-07 | End: 2019-10-17

## 2019-10-07 NOTE — PROGRESS NOTES
Assessment/Plan:         Diagnoses and all orders for this visit:    Mastoiditis of right side  -     amoxicillin (AMOXIL) 500 mg capsule; Take 1 capsule (500 mg total) by mouth every 8 (eight) hours for 10 days      Cont with OTC symptom treatment  Warm compresses as needed       Subjective:      Patient ID: Senthil Phillip is a 36 y o  female  HPI    Going on about 2 weeks   Started with either URI  Or allergies  That got better but sore throat still there  Using otc symptom treatment   More on right side and going up to ear  Hurts to swallow anything but ice cream   Able to swallow but hurts  The following portions of the patient's history were reviewed and updated as appropriate: allergies, current medications, past family history, past medical history, past social history, past surgical history and problem list     Review of Systems   Constitutional: Negative for activity change, appetite change, chills and fever  HENT: Positive for sore throat  Negative for congestion, postnasal drip, rhinorrhea and sinus pain  Respiratory: Negative for cough and shortness of breath  Cardiovascular: Negative for chest pain  Genitourinary: Negative for urgency  Neurological: Negative for dizziness, weakness, light-headedness and headaches  Objective:  Vitals:    10/07/19 1433   BP: 148/88   BP Location: Left arm   Patient Position: Sitting   Cuff Size: Large   Pulse: 86   Resp: 18   Temp: 97 5 °F (36 4 °C)   TempSrc: Temporal   SpO2: 97%   Weight: 131 kg (287 lb 14 4 oz)   Height: 5' 6" (1 676 m)      Physical Exam   Constitutional: She appears well-developed and well-nourished  HENT:   Head:       Right Ear: Hearing, external ear and ear canal normal  Tympanic membrane is erythematous  A middle ear effusion is present  Left Ear: Hearing, tympanic membrane, external ear and ear canal normal    Eyes: Conjunctivae are normal    Neck: Neck supple         Cardiovascular: Normal rate, regular rhythm and normal heart sounds  Lymphadenopathy:     She has cervical adenopathy  Vitals reviewed

## 2019-10-08 LAB — S PYO AG THROAT QL: NEGATIVE

## 2019-10-11 ENCOUNTER — TELEPHONE (OUTPATIENT)
Dept: FAMILY MEDICINE CLINIC | Facility: CLINIC | Age: 34
End: 2019-10-11

## 2019-10-11 LAB — BACTERIA THROAT CULT: ABNORMAL

## 2019-12-18 ENCOUNTER — TELEPHONE (OUTPATIENT)
Dept: OBGYN CLINIC | Facility: MEDICAL CENTER | Age: 34
End: 2019-12-18

## 2019-12-18 NOTE — TELEPHONE ENCOUNTER
Appointment canceled for [employer] ([id number])   Visit Type: NURSE VISIT PG   Date        Time      Length    Provider                  Department   12/23/2019  11:00 AM  15 mins   [facility] [address] [facility]      Reason for Cancellation: Patient      Patient Comments: I had the nurse at my school administer my Depo-Provera shot on 12/18/19   The information from the bottom of the box is [id number]; 08/2021; [id number]

## 2020-01-24 DIAGNOSIS — E03.9 HYPOTHYROIDISM, UNSPECIFIED TYPE: ICD-10-CM

## 2020-01-24 DIAGNOSIS — F32.A DEPRESSION, UNSPECIFIED DEPRESSION TYPE: ICD-10-CM

## 2020-01-24 RX ORDER — CITALOPRAM 40 MG/1
40 TABLET ORAL DAILY
Qty: 90 TABLET | Refills: 3 | Status: SHIPPED | OUTPATIENT
Start: 2020-01-24 | End: 2020-02-11 | Stop reason: SDUPTHER

## 2020-01-24 RX ORDER — LEVOTHYROXINE SODIUM 137 UG/1
TABLET ORAL
Qty: 90 TABLET | Refills: 0 | Status: SHIPPED | OUTPATIENT
Start: 2020-01-24 | End: 2020-02-11 | Stop reason: SDUPTHER

## 2020-02-11 DIAGNOSIS — E03.9 HYPOTHYROIDISM, UNSPECIFIED TYPE: ICD-10-CM

## 2020-02-11 DIAGNOSIS — F32.A DEPRESSION, UNSPECIFIED DEPRESSION TYPE: ICD-10-CM

## 2020-02-11 RX ORDER — LEVOTHYROXINE SODIUM 137 UG/1
TABLET ORAL
Qty: 90 TABLET | Refills: 3 | Status: SHIPPED | OUTPATIENT
Start: 2020-02-11 | End: 2020-02-14 | Stop reason: SDUPTHER

## 2020-02-11 RX ORDER — CITALOPRAM 40 MG/1
40 TABLET ORAL DAILY
Qty: 90 TABLET | Refills: 3 | Status: SHIPPED | OUTPATIENT
Start: 2020-02-11 | End: 2020-02-12 | Stop reason: SDUPTHER

## 2020-02-12 DIAGNOSIS — F32.A DEPRESSION, UNSPECIFIED DEPRESSION TYPE: ICD-10-CM

## 2020-02-12 RX ORDER — CITALOPRAM 40 MG/1
40 TABLET ORAL DAILY
Qty: 90 TABLET | Refills: 3 | Status: SHIPPED | OUTPATIENT
Start: 2020-02-12 | End: 2020-03-03 | Stop reason: ALTCHOICE

## 2020-02-14 DIAGNOSIS — E03.9 HYPOTHYROIDISM, UNSPECIFIED TYPE: ICD-10-CM

## 2020-02-14 RX ORDER — LEVOTHYROXINE SODIUM 137 UG/1
TABLET ORAL
Qty: 90 TABLET | Refills: 3 | Status: SHIPPED | OUTPATIENT
Start: 2020-02-14 | End: 2020-04-29

## 2020-02-14 NOTE — TELEPHONE ENCOUNTER
Patient needs [facility]othyroxine sent [facility], looks like it was sent to wrong pharmacy? She is almost out of her medication  Please resend

## 2020-02-14 NOTE — TELEPHONE ENCOUNTER
Pt medication was [facility] the [facility]  This medication has been called into the right mail in pharmacy and is now in the pts chart      Levothyroxine 137 mcg tablet 3 refills   Citalopram 40 mg with 3 refills

## 2020-03-02 DIAGNOSIS — Z23 NEED FOR INFLUENZA VACCINATION: ICD-10-CM

## 2020-03-02 DIAGNOSIS — Z23 NEED FOR TD VACCINE: ICD-10-CM

## 2020-03-02 DIAGNOSIS — Z11.4 SCREENING FOR HIV (HUMAN IMMUNODEFICIENCY VIRUS): Primary | ICD-10-CM

## 2020-03-03 ENCOUNTER — OFFICE VISIT (OUTPATIENT)
Dept: FAMILY MEDICINE CLINIC | Facility: CLINIC | Age: 35
End: 2020-03-03
Payer: COMMERCIAL

## 2020-03-03 VITALS
SYSTOLIC BLOOD PRESSURE: 138 MMHG | WEIGHT: 293 LBS | HEART RATE: 88 BPM | RESPIRATION RATE: 18 BRPM | BODY MASS INDEX: 48.82 KG/M2 | DIASTOLIC BLOOD PRESSURE: 84 MMHG | HEIGHT: 65 IN

## 2020-03-03 DIAGNOSIS — Z11.4 SCREENING FOR HIV (HUMAN IMMUNODEFICIENCY VIRUS): ICD-10-CM

## 2020-03-03 DIAGNOSIS — F41.9 ANXIETY: ICD-10-CM

## 2020-03-03 DIAGNOSIS — Z00.00 ANNUAL PHYSICAL EXAM: Primary | ICD-10-CM

## 2020-03-03 DIAGNOSIS — E03.9 HYPOTHYROIDISM, UNSPECIFIED TYPE: ICD-10-CM

## 2020-03-03 DIAGNOSIS — E66.01 MORBID OBESITY WITH BMI OF 45.0-49.9, ADULT (HCC): ICD-10-CM

## 2020-03-03 DIAGNOSIS — Z23 IMMUNIZATION DUE: ICD-10-CM

## 2020-03-03 PROCEDURE — 90471 IMMUNIZATION ADMIN: CPT

## 2020-03-03 PROCEDURE — 99395 PREV VISIT EST AGE 18-39: CPT | Performed by: NURSE PRACTITIONER

## 2020-03-03 PROCEDURE — 90714 TD VACC NO PRESV 7 YRS+ IM: CPT

## 2020-03-03 RX ORDER — LORATADINE 10 MG/1
10 TABLET ORAL DAILY
COMMUNITY
End: 2020-05-12 | Stop reason: ALTCHOICE

## 2020-03-03 RX ORDER — BUSPIRONE HYDROCHLORIDE 5 MG/1
TABLET ORAL
Qty: 60 TABLET | Refills: 5 | Status: SHIPPED | OUTPATIENT
Start: 2020-03-03 | End: 2020-05-19 | Stop reason: SINTOL

## 2020-03-03 NOTE — PROGRESS NOTES
[address]    NAME: Khadra Curiel  AGE: 30 y o  SEX: female  : 1985     DATE: 3/3/2020     Assessment and Plan:     Problem List Items Addressed This Visit        Endocrine    Hypothyroidism    Relevant Orders    TSH, 3rd generation with Free T4 reflex       Other    Anxiety    Relevant Medications    busPIRone (BUSPAR) 5 mg tablet  Decrease celexa to 20 mg daily fo r 5 days then stop   Start the buspar tomorrow and increase once off celexa to buspar twice a day       Other Visit Diagnoses     Annual physical exam    -  Primary    Morbid obesity with BMI of 45 0-49 9, adult ([facility] [address] )        Relevant Orders    Ambulatory referral to Weight Management    Screening for HIV (human immunodeficiency virus)        Relevant Orders    HIV 1/2 AG-AB combo    Immunization due        Relevant Orders    TD VACCINE GREATER THAN OR EQUAL TO 6YO PRESERVATIVE FREE IM          Immunizations and preventive care screenings were discussed with patient today  Appropriate education was printed on patient's after visit summary  Counseling:  Alcohol/drug use: discussed moderation in alcohol intake, the recommendations for healthy alcohol use, and avoidance of illicit drug use  Dental Health: discussed importance of regular tooth brushing, flossing, and dental visits  · Exercise: the importance of regular exercise/physical activity was discussed  Recommend exercise 3-5 times per week for at least 30 minutes  BMI Counseling: Body mass index is 49 34 kg/m²  The BMI is above normal  Nutrition recommendations include decreasing portion sizes, encouraging healthy choices of fruits and vegetables, decreasing fast food intake, consuming healthier snacks and moderation in carbohydrate intake  Exercise recommendations include exercising 3-5 times per week  No pharmacotherapy was ordered         Depression Screening and Follow-up Plan: Patient's depression screening was positive with a PHQ-2 score of 4  Their PHQ-9 score was 10  Depression likely due to other medical condition  Will treat underlying condition  Patient advised to follow-up with PCP for further management  Return in about 6 weeks (around 4/14/2020)  Chief Complaint:     Chief Complaint   Patient presents with    Physical Exam      History of Present Illness:     Adult Annual Physical   Patient here for a comprehensive physical exam  The patient reports problems - 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 [employer]    Issues with sexual desire  Is stress eating also     Diet and Physical Activity  · Diet/Nutrition: poor diet, frequent junk food and limited fruits/vegetables  · Exercise: no formal exercise  Depression Screening  PHQ-9 Depression Screening    PHQ-9:    Frequency of the following problems over the past two weeks:       Little interest or pleasure in doing things:  1 - several days  Feeling down, depressed, or hopeless:  3 - nearly every day  Trouble falling or staying asleep, or sleeping too much:  0 - not at all  Feeling tired or having little energy:  2 - more than half the days  Poor appetite or overeating:  3 - nearly every day  Feeling bad about yourself - or that you are a failure or have let yourself or your family down:  1 - several days  Trouble concentrating on things, such as reading the newspaper or watching television:  0 - not at all  Moving or speaking so slowly that other people could have noticed  Or the opposite - being so fidgety or restless that you have been moving around a lot more than usual:  0 - not at all  Thoughts that you would be better off dead, or of hurting yourself in some way:  0 - not at all  PHQ-2 Score:  4  PHQ-9 Score:  10       General Health  · Sleep: sleeps well  · Hearing: normal - bilateral   · Vision: no vision problems  · Dental: regular dental visits  ·   ·     ·      Review of Systems:     Review of Systems   Constitutional: Negative for activity change, appetite change, fatigue, fever and unexpected weight change  HENT: Negative for congestion, dental problem, postnasal drip and sneezing  Eyes: Negative for discharge and visual disturbance  Respiratory: Negative for cough and wheezing  Cardiovascular: Negative for chest pain  Gastrointestinal: Negative for abdominal pain, constipation, diarrhea, nausea and vomiting  Endocrine: Negative for polydipsia and polyuria  Genitourinary: Negative for dysuria, frequency and urgency  Musculoskeletal: Negative for arthralgias  Skin: Negative for rash  Allergic/Immunologic: Negative for environmental allergies and food allergies  Neurological: Negative for dizziness, numbness and headaches  Hematological: Negative for adenopathy  Psychiatric/Behavioral: Negative for behavioral problems and sleep disturbance  The patient is nervous/anxious  Past Medical History:     History reviewed  No pertinent past medical history     Past Surgical History:     Past Surgical History:   Procedure Laterality Date    BREAST SURGERY      reduction    TONSILLECTOMY        Social History:        Social History     Socioeconomic History    Marital status: /Civil Union     Spouse name: None    Number of children: None    Years of education: None    Highest education level: None   Occupational History    None   Social Needs    Financial resource strain: None    Food insecurity:     Worry: None     Inability: None    Transportation needs:     Medical: None     Non-medical: None   Tobacco Use    Smoking status: Never Smoker    Smokeless tobacco: Never Used   Substance and Sexual Activity    Alcohol use: Yes     Frequency: Monthly or less    Drug use: No    Sexual activity: Yes     Partners: Male     Birth control/protection: Injection     Comment:    Lifestyle    Physical activity:     Days per week: None Minutes per session: None    Stress: None   Relationships    Social connections:     Talks on phone: None     Gets together: None     Attends Worship service: None     Active member of club or organization: None     Attends meetings of clubs or organizations: None     Relationship status: None    Intimate partner violence:     Fear of current or ex partner: None     Emotionally abused: None     Physically abused: None     Forced sexual activity: None   Other Topics Concern    None   Social History Narrative    None      Family History:     Family History   Problem Relation Age of Onset    No Known Problems Mother     Ovarian cancer Maternal Grandmother       Current Medications:     Current Outpatient Medications   Medication Sig Dispense Refill    levothyroxine 137 mcg tablet Take 1 tablet by mouth daily 90 tablet 3    loratadine (CLARITIN) 10 mg tablet Take 10 mg by mouth daily      medroxyPROGESTERone (DEPO-PROVERA) 150 mg/mL injection Inject 1 mL (150 mg total) into a muscle every 3 (three) months As directed 1 mL 3    montelukast (SINGULAIR) 10 mg tablet Take 1 tablet (10 mg total) by mouth daily at bedtime 90 tablet 3    busPIRone (BUSPAR) 5 mg tablet Start with 1 daily and after 1 week twice a day  60 tablet 5    Cholecalciferol (VITAMIN D3) 61015 units CAPS take 1 capsule by oral route  every week      fluticasone (FLONASE) 50 mcg/act nasal spray 2 sprays into each nostril daily (Patient not taking: Reported on 3/3/2020) 1 Bottle 3     No current facility-administered medications for this visit  Allergies: Allergies   Allergen Reactions    Erythromycin Rash      Physical Exam:     /84 (BP Location: Left arm, Patient Position: Sitting, Cuff Size: Large)   Pulse 88   Resp 18   Ht 5' 5" (1 651 m)   Wt 134 kg (296 lb 8 oz)   BMI 49 34 kg/m²     Physical Exam   Constitutional: She is oriented to person, place, and time  She appears well-developed and well-nourished     HENT: Head: Normocephalic  Right Ear: External ear normal    Left Ear: External ear normal    Nose: Nose normal    Eyes: Pupils are equal, round, and reactive to light  Conjunctivae are normal  Right eye exhibits no discharge  Left eye exhibits no discharge  Neck: Normal range of motion  Neck supple  No thyromegaly present  Cardiovascular: Normal rate, regular rhythm and normal heart sounds  No murmur heard  Pulmonary/Chest: Effort normal and breath sounds normal    Abdominal: Soft  Bowel sounds are normal  There is no tenderness  Musculoskeletal: Normal range of motion  Lymphadenopathy:     She has no cervical adenopathy  Neurological: She is alert and oriented to person, place, and time  Skin: Skin is warm  No rash noted  Psychiatric: Her speech is normal and behavior is normal  Judgment and thought content normal  Her mood appears anxious  Cognition and memory are normal    Vitals reviewed        Mary Guido, [address] [place] [address]

## 2020-03-03 NOTE — PATIENT INSTRUCTIONS

## 2020-03-06 ENCOUNTER — TELEPHONE (OUTPATIENT)
Dept: FAMILY MEDICINE CLINIC | Facility: CLINIC | Age: 35
End: 2020-03-06

## 2020-03-06 DIAGNOSIS — R53.83 FATIGUE, UNSPECIFIED TYPE: Primary | ICD-10-CM

## 2020-03-06 NOTE — TELEPHONE ENCOUNTER
Patient called because she was in with Chela on 3/3 and wanted to know if she could also get her iron checked when she goes for her other blood work? Please advise

## 2020-03-09 ENCOUNTER — APPOINTMENT (OUTPATIENT)
Dept: LAB | Facility: OTHER | Age: 35
End: 2020-03-09
Payer: COMMERCIAL

## 2020-03-09 ENCOUNTER — TRANSCRIBE ORDERS (OUTPATIENT)
Dept: LAB | Facility: OTHER | Age: 35
End: 2020-03-09

## 2020-03-09 DIAGNOSIS — Z11.4 SCREENING FOR HIV (HUMAN IMMUNODEFICIENCY VIRUS): ICD-10-CM

## 2020-03-09 DIAGNOSIS — E03.9 HYPOTHYROIDISM, UNSPECIFIED TYPE: ICD-10-CM

## 2020-03-09 DIAGNOSIS — R53.83 FATIGUE, UNSPECIFIED TYPE: ICD-10-CM

## 2020-03-09 LAB
IRON SERPL-MCNC: 66 UG/DL (ref 50–170)
TSH SERPL DL<=0.05 MIU/L-ACNC: 1.66 UIU/ML (ref 0.36–3.74)

## 2020-03-09 PROCEDURE — 83540 ASSAY OF IRON: CPT

## 2020-03-09 PROCEDURE — 84443 ASSAY THYROID STIM HORMONE: CPT

## 2020-03-09 PROCEDURE — 36415 COLL VENOUS BLD VENIPUNCTURE: CPT

## 2020-03-09 PROCEDURE — 87389 HIV-1 AG W/HIV-1&-2 AB AG IA: CPT

## 2020-03-11 LAB — HIV 1+2 AB+HIV1 P24 AG SERPL QL IA: NORMAL

## 2020-03-11 NOTE — TELEPHONE ENCOUNTER
Patient is aware of the results with her blood work being normal she also ask for the number to weight management [phone]

## 2020-04-29 DIAGNOSIS — E03.9 HYPOTHYROIDISM, UNSPECIFIED TYPE: ICD-10-CM

## 2020-04-29 RX ORDER — LEVOTHYROXINE SODIUM 137 UG/1
TABLET ORAL
Qty: 90 TABLET | Refills: 3 | Status: SHIPPED | OUTPATIENT
Start: 2020-04-29 | End: 2020-05-05

## 2020-05-04 ENCOUNTER — DOCUMENTATION (OUTPATIENT)
Dept: FAMILY MEDICINE CLINIC | Facility: CLINIC | Age: 35
End: 2020-05-04

## 2020-05-05 DIAGNOSIS — E03.9 HYPOTHYROIDISM, UNSPECIFIED TYPE: ICD-10-CM

## 2020-05-05 RX ORDER — LEVOTHYROXINE SODIUM 137 UG/1
TABLET ORAL
Qty: 90 TABLET | Refills: 3 | Status: SHIPPED | OUTPATIENT
Start: 2020-05-05 | End: 2021-03-20 | Stop reason: SDUPTHER

## 2020-05-12 ENCOUNTER — TELEPHONE (OUTPATIENT)
Dept: BARIATRICS | Facility: CLINIC | Age: 35
End: 2020-05-12

## 2020-05-12 ENCOUNTER — TELEMEDICINE (OUTPATIENT)
Dept: BARIATRICS | Facility: CLINIC | Age: 35
End: 2020-05-12
Payer: COMMERCIAL

## 2020-05-12 VITALS — BODY MASS INDEX: 48.82 KG/M2 | WEIGHT: 293 LBS | HEIGHT: 65 IN

## 2020-05-12 DIAGNOSIS — E66.01 CLASS 3 SEVERE OBESITY DUE TO EXCESS CALORIES WITHOUT SERIOUS COMORBIDITY WITH BODY MASS INDEX (BMI) OF 45.0 TO 49.9 IN ADULT (HCC): Primary | ICD-10-CM

## 2020-05-12 DIAGNOSIS — E03.9 HYPOTHYROIDISM, UNSPECIFIED TYPE: ICD-10-CM

## 2020-05-12 DIAGNOSIS — F41.9 ANXIETY: ICD-10-CM

## 2020-05-12 DIAGNOSIS — E66.01 MORBID OBESITY WITH BMI OF 45.0-49.9, ADULT (HCC): ICD-10-CM

## 2020-05-12 PROCEDURE — 99243 OFF/OP CNSLTJ NEW/EST LOW 30: CPT | Performed by: FAMILY MEDICINE

## 2020-05-12 RX ORDER — OLOPATADINE HYDROCHLORIDE 2 MG/ML
SOLUTION/ DROPS OPHTHALMIC
COMMUNITY
Start: 2020-05-12

## 2020-05-19 ENCOUNTER — APPOINTMENT (OUTPATIENT)
Dept: LAB | Facility: IMAGING CENTER | Age: 35
End: 2020-05-19
Payer: COMMERCIAL

## 2020-05-19 DIAGNOSIS — E03.9 HYPOTHYROIDISM, UNSPECIFIED TYPE: ICD-10-CM

## 2020-05-19 DIAGNOSIS — F41.9 ANXIETY: ICD-10-CM

## 2020-05-19 DIAGNOSIS — F41.9 ANXIETY: Primary | ICD-10-CM

## 2020-05-19 DIAGNOSIS — E66.01 CLASS 3 SEVERE OBESITY DUE TO EXCESS CALORIES WITHOUT SERIOUS COMORBIDITY WITH BODY MASS INDEX (BMI) OF 45.0 TO 49.9 IN ADULT (HCC): ICD-10-CM

## 2020-05-19 LAB
ALBUMIN SERPL BCP-MCNC: 3.8 G/DL (ref 3.5–5)
ALP SERPL-CCNC: 81 U/L (ref 46–116)
ALT SERPL W P-5'-P-CCNC: 27 U/L (ref 12–78)
ANION GAP SERPL CALCULATED.3IONS-SCNC: 5 MMOL/L (ref 4–13)
AST SERPL W P-5'-P-CCNC: 14 U/L (ref 5–45)
BASOPHILS # BLD AUTO: 0.08 THOUSANDS/ΜL (ref 0–0.1)
BASOPHILS NFR BLD AUTO: 1 % (ref 0–1)
BILIRUB SERPL-MCNC: 0.48 MG/DL (ref 0.2–1)
BUN SERPL-MCNC: 18 MG/DL (ref 5–25)
CALCIUM SERPL-MCNC: 9 MG/DL (ref 8.3–10.1)
CHLORIDE SERPL-SCNC: 108 MMOL/L (ref 100–108)
CHOLEST SERPL-MCNC: 225 MG/DL (ref 50–200)
CO2 SERPL-SCNC: 24 MMOL/L (ref 21–32)
CREAT SERPL-MCNC: 0.89 MG/DL (ref 0.6–1.3)
EOSINOPHIL # BLD AUTO: 0.36 THOUSAND/ΜL (ref 0–0.61)
EOSINOPHIL NFR BLD AUTO: 4 % (ref 0–6)
ERYTHROCYTE [DISTWIDTH] IN BLOOD BY AUTOMATED COUNT: 12.9 % (ref 11.6–15.1)
EST. AVERAGE GLUCOSE BLD GHB EST-MCNC: 108 MG/DL
GFR SERPL CREATININE-BSD FRML MDRD: 85 ML/MIN/1.73SQ M
GLUCOSE P FAST SERPL-MCNC: 98 MG/DL (ref 65–99)
HBA1C MFR BLD: 5.4 %
HCT VFR BLD AUTO: 45.2 % (ref 34.8–46.1)
HDLC SERPL-MCNC: 36 MG/DL
HGB BLD-MCNC: 14.4 G/DL (ref 11.5–15.4)
IMM GRANULOCYTES # BLD AUTO: 0.03 THOUSAND/UL (ref 0–0.2)
IMM GRANULOCYTES NFR BLD AUTO: 0 % (ref 0–2)
LDLC SERPL CALC-MCNC: 147 MG/DL (ref 0–100)
LYMPHOCYTES # BLD AUTO: 2.83 THOUSANDS/ΜL (ref 0.6–4.47)
LYMPHOCYTES NFR BLD AUTO: 33 % (ref 14–44)
MCH RBC QN AUTO: 28.8 PG (ref 26.8–34.3)
MCHC RBC AUTO-ENTMCNC: 31.9 G/DL (ref 31.4–37.4)
MCV RBC AUTO: 90 FL (ref 82–98)
MONOCYTES # BLD AUTO: 0.48 THOUSAND/ΜL (ref 0.17–1.22)
MONOCYTES NFR BLD AUTO: 6 % (ref 4–12)
NEUTROPHILS # BLD AUTO: 4.91 THOUSANDS/ΜL (ref 1.85–7.62)
NEUTS SEG NFR BLD AUTO: 56 % (ref 43–75)
NONHDLC SERPL-MCNC: 189 MG/DL
NRBC BLD AUTO-RTO: 0 /100 WBCS
PLATELET # BLD AUTO: 360 THOUSANDS/UL (ref 149–390)
PMV BLD AUTO: 10.6 FL (ref 8.9–12.7)
POTASSIUM SERPL-SCNC: 4.4 MMOL/L (ref 3.5–5.3)
PROT SERPL-MCNC: 7.8 G/DL (ref 6.4–8.2)
RBC # BLD AUTO: 5 MILLION/UL (ref 3.81–5.12)
SODIUM SERPL-SCNC: 137 MMOL/L (ref 136–145)
TRIGL SERPL-MCNC: 209 MG/DL
WBC # BLD AUTO: 8.69 THOUSAND/UL (ref 4.31–10.16)

## 2020-05-19 PROCEDURE — 83036 HEMOGLOBIN GLYCOSYLATED A1C: CPT

## 2020-05-19 PROCEDURE — 85025 COMPLETE CBC W/AUTO DIFF WBC: CPT

## 2020-05-19 PROCEDURE — 36415 COLL VENOUS BLD VENIPUNCTURE: CPT

## 2020-05-19 PROCEDURE — 80061 LIPID PANEL: CPT

## 2020-05-19 PROCEDURE — 80053 COMPREHEN METABOLIC PANEL: CPT

## 2020-05-19 RX ORDER — CITALOPRAM 40 MG/1
40 TABLET ORAL DAILY
Start: 2020-05-19 | End: 2021-01-19 | Stop reason: SDUPTHER

## 2020-06-23 ENCOUNTER — TELEMEDICINE (OUTPATIENT)
Dept: BARIATRICS | Facility: CLINIC | Age: 35
End: 2020-06-23
Payer: COMMERCIAL

## 2020-06-23 VITALS — HEIGHT: 65 IN | WEIGHT: 291.6 LBS | BODY MASS INDEX: 48.58 KG/M2

## 2020-06-23 DIAGNOSIS — E66.01 CLASS 3 SEVERE OBESITY DUE TO EXCESS CALORIES WITHOUT SERIOUS COMORBIDITY WITH BODY MASS INDEX (BMI) OF 45.0 TO 49.9 IN ADULT (HCC): Primary | ICD-10-CM

## 2020-06-23 DIAGNOSIS — E03.9 HYPOTHYROIDISM, UNSPECIFIED TYPE: ICD-10-CM

## 2020-06-23 PROCEDURE — 3008F BODY MASS INDEX DOCD: CPT | Performed by: FAMILY MEDICINE

## 2020-06-23 PROCEDURE — 99214 OFFICE O/P EST MOD 30 MIN: CPT | Performed by: FAMILY MEDICINE

## 2020-06-23 RX ORDER — AZELASTINE HCL 205.5 UG/1
SPRAY NASAL
COMMUNITY
Start: 2020-05-12 | End: 2022-01-06 | Stop reason: ALTCHOICE

## 2020-06-23 RX ORDER — CETIRIZINE HYDROCHLORIDE 10 MG/1
10 TABLET ORAL DAILY
COMMUNITY

## 2020-08-13 ENCOUNTER — TELEPHONE (OUTPATIENT)
Dept: OBGYN CLINIC | Facility: MEDICAL CENTER | Age: 35
End: 2020-08-13

## 2020-08-13 NOTE — TELEPHONE ENCOUNTER
Pt sent [employer] message stating "A friend who is a nurse administered my shot on 8/10/20  LOT [id number] 04/2022SN [id number]"

## 2020-09-24 DIAGNOSIS — Z30.42 ENCOUNTER FOR SURVEILLANCE OF INJECTABLE CONTRACEPTIVE: ICD-10-CM

## 2020-09-24 RX ORDER — MEDROXYPROGESTERONE ACETATE 150 MG/ML
INJECTION, SUSPENSION INTRAMUSCULAR
Qty: 1 ML | Refills: 3 | Status: SHIPPED | OUTPATIENT
Start: 2020-09-24 | End: 2021-01-12 | Stop reason: ALTCHOICE

## 2020-09-28 ENCOUNTER — ANNUAL EXAM (OUTPATIENT)
Dept: OBGYN CLINIC | Facility: MEDICAL CENTER | Age: 35
End: 2020-09-28
Payer: COMMERCIAL

## 2020-09-28 VITALS
BODY MASS INDEX: 48.82 KG/M2 | DIASTOLIC BLOOD PRESSURE: 70 MMHG | WEIGHT: 293 LBS | SYSTOLIC BLOOD PRESSURE: 110 MMHG | TEMPERATURE: 96.9 F | HEIGHT: 65 IN

## 2020-09-28 DIAGNOSIS — Z01.419 ENCNTR FOR GYN EXAM (GENERAL) (ROUTINE) W/O ABN FINDINGS: Primary | ICD-10-CM

## 2020-09-28 PROCEDURE — S0612 ANNUAL GYNECOLOGICAL EXAMINA: HCPCS | Performed by: NURSE PRACTITIONER

## 2020-09-28 NOTE — PROGRESS NOTES
ASSESSMENT & PLAN: Kerri Beltran is a 30 y o   with normal gynecologic exam     1   Routine well woman exam done today  2  Pap and HPV:  The patient's last pap and hpv was   It was normal     Pap and cotesting was not done today  Current ASCCP Guidelines reviewed  3   The following were reviewed in today's visit: breast self exam, family planning choices, adequate intake of calcium and vitamin D, exercise and healthy diet  D/w pt importance of weight loss prior to conception  Rec  ww which she used in the past successfully  Informed it may take up to 1 year -18 months to ovulate after depo is stopped, But could also ovulatate soon after it is stopped  Will figure out her time frame that she wants to conceive and use condoms when stops depo until she is ready to conceive  Can start multivitamin with folic acid        4  rto yearly gyn exam, call sooner with any questions/concerns  CC:  Annual Gynecologic Examination    HPI: Kerri Beltran is a 30 y o   who presents for annual gynecologic examination  She has the following concerns: wants to get pregnant but would like to conceive 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, 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  On depo for 7 years     Health Maintenance:    She wears her seatbelt routinely  She does perform regular monthly self breast exams  She feels safe at home  History reviewed  No pertinent past medical history  Past Surgical History:   Procedure Laterality Date    BREAST SURGERY      reduction    TONSILLECTOMY         Past OB/Gyn History:  OB History        0    Para   0    Term   0       0    AB   0    Living   0       SAB   0    TAB   0    Ectopic   0    Multiple   0    Live Births   0               Pt does not have menstrual issues  History of sexually transmitted infection: No   History of abnormal pap smears: No      Patient is currently sexually active    heterosexual   The current method of family planning is Depo-Provera injections  Family History   Problem Relation Age of Onset    No Known Problems Mother     Ovarian cancer Maternal Grandmother        Social History:  Social History     Socioeconomic History    Marital status: /Civil Union     Spouse name: Not on file    Number of children: Not on file    Years of education: Not on file    Highest education level: Not on file   Occupational History    Not on file   Social Needs    Financial resource strain: Not on file    Food insecurity     Worry: Not on file     Inability: Not on file   [employer] needs     Medical: Not on file     Non-medical: Not on file   Tobacco Use    Smoking status: Never Smoker    Smokeless tobacco: Never Used   Substance and Sexual Activity    Alcohol use: Yes     Frequency: Monthly or less    Drug use: No    Sexual activity: Yes     Partners: Male     Birth control/protection: Injection     Comment:    Lifestyle    Physical activity     Days per week: Not on file     Minutes per session: Not on file    Stress: Not on file   Relationships    Social connections     Talks on phone: Not on file     Gets together: Not on file     Attends Congregation service: Not on file     Active member of club or organization: Not on file     Attends meetings of clubs or organizations: Not on file     Relationship status: Not on file    Intimate partner violence     Fear of current or ex partner: Not on file     Emotionally abused: Not on file     Physically abused: Not on file     Forced sexual activity: Not on file   Other Topics Concern    Not on file   Social History Narrative    Not on file     Presently lives with spouse  Patient is     Patient is currently employed at [employer] in [facility]    Allergies   Allergen Reactions    Erythromycin Rash         Current Outpatient Medications:     Azelastine HCl 0 15 % SOLN, , Disp: , Rfl:     cetirizine (ZyrTEC Allergy) 10 mg tablet, Take 10 mg by mouth daily, Disp: , Rfl:     citalopram (CeleXA) 40 mg tablet, Take 1 tablet (40 mg total) by mouth daily, Disp: , Rfl:     levothyroxine 137 mcg tablet, TAKE 1 TABLET BY MOUTH DAILY  , Disp: 90 tablet, Rfl: 3    medroxyPROGESTERone (DEPO-PROVERA) 150 mg/mL injection, INJECT 1 ML INTO A MUSCLE EVERY 3 (THREE) MONTHS AS DIRECTED, Disp: 1 mL, Rfl: 3    olopatadine HCl (PATADAY) 0 2 % opth drops, , Disp: , Rfl:       Review of Systems  Constitutional :no fever, feels well, no tiredness, no recent weight gain or loss  ENT: no ear ache, no loss of hearing, no nosebleeds or nasal discharge, no sore throat or hoarseness  Cardiovascular: no complaints of slow or fast heart beat, no chest pain, no palpitations, no leg claudication or lower extremity edema  Respiratory: no complaints of shortness of shortness of breath, no SANCHEZ  Breasts:no complaints of breast pain, breast lump, or nipple discharge  Gastrointestinal: no complaints of abdominal pain, constipation, nausea, vomiting, or diarrhea or bloody stools  Genitourinary : no complaints of dysuria, incontinence, pelvic pain, no dysmenorrhea, vaginal discharge or abnormal vaginal bleeding and as noted in HPI  Musculoskeletal: no complaints of arthralgia, no myalgia, no joint swelling or stiffness, no limb pain or swelling    Integumentary: no complaints of skin rash or lesion, itching or dry skin  Neurological: no complaints of headache, no confusion, no numbness or tingling, no dizziness or fainting    Objective      /70   Temp (!) 96 9 °F (36 1 °C) (Temporal)   Ht 5' 5" (1 651 m)   Wt 133 kg (294 lb)   BMI 48 92 kg/m²   General:   appears stated age, cooperative, alert normal mood and affect   Neck: normal, supple,trachea midline, no masses   Heart: regular rate and rhythm, S1, S2 normal, no murmur, click, rub or gallop   Lungs: clear to auscultation bilaterally   Breasts: normal appearance, no masses or tenderness   Abdomen: soft, non-tender, without masses or organomegaly    Vulva: normal female genitalia   Vagina: normal vagina   Urethra: normal   Cervix: Normal, no discharge  Uterus: normal size, contour, position, consistency, mobility, non-tender   Adnexa: no mass, fullness, tenderness   Lymphatic palpation of lymph nodes in neck, axilla, groin and/or other locations: no lymphadenopathy or masses noted   Skin normal skin turgor and no rashes     Psychiatric orientation to person, place, and time: normal  mood and affect: normal

## 2020-11-30 ENCOUNTER — TELEPHONE (OUTPATIENT)
Dept: OBGYN CLINIC | Facility: MEDICAL CENTER | Age: 35
End: 2020-11-30

## 2020-12-03 ENCOUNTER — TELEPHONE (OUTPATIENT)
Dept: OBGYN CLINIC | Facility: MEDICAL CENTER | Age: 35
End: 2020-12-03

## 2020-12-23 ENCOUNTER — TELEPHONE (OUTPATIENT)
Dept: OBGYN CLINIC | Facility: MEDICAL CENTER | Age: 35
End: 2020-12-23

## 2020-12-28 ENCOUNTER — TELEPHONE (OUTPATIENT)
Dept: OBGYN CLINIC | Facility: MEDICAL CENTER | Age: 35
End: 2020-12-28

## 2021-01-08 ENCOUNTER — TELEPHONE (OUTPATIENT)
Dept: OBGYN CLINIC | Facility: MEDICAL CENTER | Age: 36
End: 2021-01-08

## 2021-01-08 NOTE — TELEPHONE ENCOUNTER
Pt called in and left vm discussed birth control change last week and wants to know the status on the change  Please review and call pt

## 2021-01-12 DIAGNOSIS — Z30.09 ENCOUNTER FOR OTHER GENERAL COUNSELING AND ADVICE ON CONTRACEPTION: Primary | ICD-10-CM

## 2021-01-12 RX ORDER — ACETAMINOPHEN AND CODEINE PHOSPHATE 120; 12 MG/5ML; MG/5ML
1 SOLUTION ORAL DAILY
Qty: 90 TABLET | Refills: 0 | Status: SHIPPED | OUTPATIENT
Start: 2021-01-12 | End: 2021-04-02

## 2021-01-19 DIAGNOSIS — F41.9 ANXIETY: ICD-10-CM

## 2021-01-19 RX ORDER — CITALOPRAM 40 MG/1
40 TABLET ORAL DAILY
Qty: 90 TABLET | Refills: 0 | Status: SHIPPED | OUTPATIENT
Start: 2021-01-19 | End: 2021-04-12

## 2021-03-10 DIAGNOSIS — Z23 ENCOUNTER FOR IMMUNIZATION: ICD-10-CM

## 2021-03-20 DIAGNOSIS — E03.9 HYPOTHYROIDISM, UNSPECIFIED TYPE: ICD-10-CM

## 2021-03-20 RX ORDER — LEVOTHYROXINE SODIUM 137 UG/1
TABLET ORAL
Qty: 90 TABLET | Refills: 3 | Status: SHIPPED | OUTPATIENT
Start: 2021-03-20 | End: 2021-11-01 | Stop reason: SDUPTHER

## 2021-04-02 DIAGNOSIS — Z30.09 ENCOUNTER FOR OTHER GENERAL COUNSELING AND ADVICE ON CONTRACEPTION: ICD-10-CM

## 2021-04-02 RX ORDER — ACETAMINOPHEN AND CODEINE PHOSPHATE 120; 12 MG/5ML; MG/5ML
SOLUTION ORAL
Qty: 84 TABLET | Refills: 1 | Status: SHIPPED | OUTPATIENT
Start: 2021-04-02 | End: 2021-10-03 | Stop reason: ALTCHOICE

## 2021-04-11 DIAGNOSIS — F41.9 ANXIETY: ICD-10-CM

## 2021-04-12 RX ORDER — CITALOPRAM 40 MG/1
TABLET ORAL
Qty: 90 TABLET | Refills: 0 | Status: SHIPPED | OUTPATIENT
Start: 2021-04-12 | End: 2021-07-02 | Stop reason: SDUPTHER

## 2021-07-02 DIAGNOSIS — F41.9 ANXIETY: ICD-10-CM

## 2021-07-02 RX ORDER — CITALOPRAM 40 MG/1
TABLET ORAL
Qty: 90 TABLET | Refills: 0 | Status: SHIPPED | OUTPATIENT
Start: 2021-07-02 | End: 2021-09-21

## 2021-09-21 DIAGNOSIS — F41.9 ANXIETY: ICD-10-CM

## 2021-09-21 RX ORDER — CITALOPRAM 40 MG/1
TABLET ORAL
Qty: 90 TABLET | Refills: 0 | Status: SHIPPED | OUTPATIENT
Start: 2021-09-21 | End: 2021-11-15

## 2021-09-29 PROBLEM — Z01.419 ENCNTR FOR GYN EXAM (GENERAL) (ROUTINE) W/O ABN FINDINGS: Status: RESOLVED | Noted: 2018-08-02 | Resolved: 2021-09-29

## 2021-09-29 PROBLEM — Z30.42 ENCOUNTER FOR SURVEILLANCE OF INJECTABLE CONTRACEPTIVE: Status: RESOLVED | Noted: 2018-08-02 | Resolved: 2021-09-29

## 2021-09-30 ENCOUNTER — ANNUAL EXAM (OUTPATIENT)
Dept: OBGYN CLINIC | Facility: MEDICAL CENTER | Age: 36
End: 2021-09-30
Payer: COMMERCIAL

## 2021-09-30 VITALS — BODY MASS INDEX: 48.92 KG/M2 | HEIGHT: 65 IN | DIASTOLIC BLOOD PRESSURE: 78 MMHG | SYSTOLIC BLOOD PRESSURE: 130 MMHG

## 2021-09-30 DIAGNOSIS — Z12.4 SCREENING FOR MALIGNANT NEOPLASM OF CERVIX: ICD-10-CM

## 2021-09-30 DIAGNOSIS — Z01.419 ROUTINE GYNECOLOGICAL EXAMINATION: Primary | ICD-10-CM

## 2021-09-30 DIAGNOSIS — F41.9 ANXIETY: ICD-10-CM

## 2021-09-30 DIAGNOSIS — N91.2 AMENORRHEA: ICD-10-CM

## 2021-09-30 DIAGNOSIS — E66.9 OBESITY, UNSPECIFIED CLASSIFICATION, UNSPECIFIED OBESITY TYPE, UNSPECIFIED WHETHER SERIOUS COMORBIDITY PRESENT: ICD-10-CM

## 2021-09-30 PROCEDURE — G0476 HPV COMBO ASSAY CA SCREEN: HCPCS | Performed by: ADVANCED PRACTICE MIDWIFE

## 2021-09-30 PROCEDURE — G0145 SCR C/V CYTO,THINLAYER,RESCR: HCPCS | Performed by: ADVANCED PRACTICE MIDWIFE

## 2021-09-30 PROCEDURE — S0612 ANNUAL GYNECOLOGICAL EXAMINA: HCPCS | Performed by: ADVANCED PRACTICE MIDWIFE

## 2021-09-30 RX ORDER — TRAMADOL HYDROCHLORIDE 50 MG/1
TABLET ORAL
COMMUNITY
Start: 2021-08-07 | End: 2022-01-06 | Stop reason: ALTCHOICE

## 2021-09-30 RX ORDER — OXYCODONE HYDROCHLORIDE AND ACETAMINOPHEN 5; 325 MG/1; MG/1
1 TABLET ORAL EVERY 8 HOURS PRN
COMMUNITY
Start: 2021-09-18 | End: 2022-01-06 | Stop reason: ALTCHOICE

## 2021-10-01 ENCOUNTER — APPOINTMENT (OUTPATIENT)
Dept: LAB | Facility: CLINIC | Age: 36
End: 2021-10-01
Payer: COMMERCIAL

## 2021-10-01 DIAGNOSIS — N91.2 AMENORRHEA: ICD-10-CM

## 2021-10-01 DIAGNOSIS — E66.9 OBESITY, UNSPECIFIED CLASSIFICATION, UNSPECIFIED OBESITY TYPE, UNSPECIFIED WHETHER SERIOUS COMORBIDITY PRESENT: ICD-10-CM

## 2021-10-01 PROCEDURE — 83036 HEMOGLOBIN GLYCOSYLATED A1C: CPT

## 2021-10-01 PROCEDURE — 84403 ASSAY OF TOTAL TESTOSTERONE: CPT

## 2021-10-01 PROCEDURE — 82627 DEHYDROEPIANDROSTERONE: CPT

## 2021-10-01 PROCEDURE — 84439 ASSAY OF FREE THYROXINE: CPT

## 2021-10-01 PROCEDURE — 36415 COLL VENOUS BLD VENIPUNCTURE: CPT

## 2021-10-01 PROCEDURE — 83001 ASSAY OF GONADOTROPIN (FSH): CPT

## 2021-10-01 PROCEDURE — 82670 ASSAY OF TOTAL ESTRADIOL: CPT

## 2021-10-01 PROCEDURE — 83525 ASSAY OF INSULIN: CPT

## 2021-10-01 PROCEDURE — 84146 ASSAY OF PROLACTIN: CPT

## 2021-10-01 PROCEDURE — 84402 ASSAY OF FREE TESTOSTERONE: CPT

## 2021-10-01 PROCEDURE — 82947 ASSAY GLUCOSE BLOOD QUANT: CPT

## 2021-10-01 PROCEDURE — 84443 ASSAY THYROID STIM HORMONE: CPT

## 2021-10-01 PROCEDURE — 83002 ASSAY OF GONADOTROPIN (LH): CPT

## 2021-10-02 LAB
EST. AVERAGE GLUCOSE BLD GHB EST-MCNC: 126 MG/DL
ESTRADIOL SERPL-MCNC: 59 PG/ML
FSH SERPL-ACNC: 4.7 MIU/ML
GLUCOSE P FAST SERPL-MCNC: 81 MG/DL (ref 65–99)
HBA1C MFR BLD: 6 %
INSULIN SERPL-ACNC: 21.5 MU/L (ref 3–25)
LH SERPL-ACNC: 6.5 MIU/ML
PROLACTIN SERPL-MCNC: 10.5 NG/ML
T4 FREE SERPL-MCNC: 1.97 NG/DL (ref 0.76–1.46)
TSH SERPL DL<=0.05 MIU/L-ACNC: 0.02 UIU/ML (ref 0.36–3.74)

## 2021-10-03 PROBLEM — M54.12 CERVICAL RADICULOPATHY: Status: ACTIVE | Noted: 2021-08-27

## 2021-10-03 LAB — DHEA-S SERPL-MCNC: 76 UG/DL (ref 57.3–279.2)

## 2021-10-04 LAB
TESTOST FREE SERPL-MCNC: 1.6 PG/ML (ref 0–4.2)
TESTOST SERPL-MCNC: 18 NG/DL (ref 8–60)

## 2021-10-05 LAB
HPV HR 12 DNA CVX QL NAA+PROBE: NEGATIVE
HPV16 DNA CVX QL NAA+PROBE: NEGATIVE
HPV18 DNA CVX QL NAA+PROBE: NEGATIVE

## 2021-10-06 LAB
LAB AP GYN PRIMARY INTERPRETATION: NORMAL
Lab: NORMAL

## 2021-10-30 ENCOUNTER — APPOINTMENT (OUTPATIENT)
Dept: LAB | Age: 36
End: 2021-10-30
Payer: COMMERCIAL

## 2021-10-30 DIAGNOSIS — N91.2 AMENORRHEA: ICD-10-CM

## 2021-10-30 DIAGNOSIS — E03.9 HYPOTHYROIDISM, UNSPECIFIED TYPE: ICD-10-CM

## 2021-10-30 LAB
T4 FREE SERPL-MCNC: 1.12 NG/DL (ref 0.76–1.46)
TSH SERPL DL<=0.05 MIU/L-ACNC: 0.2 UIU/ML (ref 0.36–3.74)

## 2021-10-30 PROCEDURE — 84439 ASSAY OF FREE THYROXINE: CPT

## 2021-10-30 PROCEDURE — 36415 COLL VENOUS BLD VENIPUNCTURE: CPT

## 2021-10-30 PROCEDURE — 84443 ASSAY THYROID STIM HORMONE: CPT

## 2021-11-15 DIAGNOSIS — F41.9 ANXIETY: ICD-10-CM

## 2021-11-15 RX ORDER — CITALOPRAM 40 MG/1
TABLET ORAL
Qty: 90 TABLET | Refills: 0 | Status: SHIPPED | OUTPATIENT
Start: 2021-11-15 | End: 2021-12-13 | Stop reason: SDUPTHER

## 2021-12-12 DIAGNOSIS — F41.9 ANXIETY: ICD-10-CM

## 2021-12-13 RX ORDER — CITALOPRAM 40 MG/1
TABLET ORAL
Qty: 90 TABLET | Refills: 0 | Status: SHIPPED | OUTPATIENT
Start: 2021-12-13 | End: 2022-06-27 | Stop reason: SDUPTHER

## 2021-12-14 ENCOUNTER — APPOINTMENT (OUTPATIENT)
Dept: LAB | Age: 36
End: 2021-12-14
Payer: COMMERCIAL

## 2021-12-14 DIAGNOSIS — E03.9 HYPOTHYROIDISM, UNSPECIFIED TYPE: ICD-10-CM

## 2021-12-14 LAB — TSH SERPL DL<=0.05 MIU/L-ACNC: 1.42 UIU/ML (ref 0.36–3.74)

## 2021-12-14 PROCEDURE — 84443 ASSAY THYROID STIM HORMONE: CPT

## 2021-12-14 PROCEDURE — 36415 COLL VENOUS BLD VENIPUNCTURE: CPT

## 2021-12-19 PROBLEM — N91.2 AMENORRHEA: Status: ACTIVE | Noted: 2021-12-19

## 2021-12-28 ENCOUNTER — HOSPITAL ENCOUNTER (OUTPATIENT)
Dept: RADIOLOGY | Facility: IMAGING CENTER | Age: 36
Discharge: HOME | End: 2021-12-28
Payer: COMMERCIAL

## 2021-12-28 DIAGNOSIS — N91.2 AMENORRHEA: ICD-10-CM

## 2021-12-28 PROCEDURE — 76856 US EXAM PELVIC COMPLETE: CPT

## 2021-12-28 PROCEDURE — 76830 TRANSVAGINAL US NON-OB: CPT

## 2022-01-06 ENCOUNTER — OFFICE VISIT (OUTPATIENT)
Dept: OBGYN CLINIC | Facility: MEDICAL CENTER | Age: 37
End: 2022-01-06
Payer: COMMERCIAL

## 2022-01-06 VITALS — DIASTOLIC BLOOD PRESSURE: 78 MMHG | HEIGHT: 65 IN | SYSTOLIC BLOOD PRESSURE: 130 MMHG | BODY MASS INDEX: 48.92 KG/M2

## 2022-01-06 DIAGNOSIS — N91.2 AMENORRHEA: Primary | ICD-10-CM

## 2022-01-06 DIAGNOSIS — E88.81 INSULIN RESISTANCE: ICD-10-CM

## 2022-01-06 PROCEDURE — 99214 OFFICE O/P EST MOD 30 MIN: CPT | Performed by: ADVANCED PRACTICE MIDWIFE

## 2022-01-06 PROCEDURE — 1036F TOBACCO NON-USER: CPT | Performed by: ADVANCED PRACTICE MIDWIFE

## 2022-01-06 RX ORDER — METFORMIN HYDROCHLORIDE 500 MG/1
500 TABLET, EXTENDED RELEASE ORAL 2 TIMES DAILY WITH MEALS
Qty: 60 TABLET | Refills: 3 | Status: SHIPPED | OUTPATIENT
Start: 2022-01-06 | End: 2022-03-22 | Stop reason: SDUPTHER

## 2022-01-06 RX ORDER — NAPROXEN 500 MG/1
500 TABLET ORAL 2 TIMES DAILY WITH MEALS
COMMUNITY
End: 2022-05-26

## 2022-01-06 RX ORDER — MEDROXYPROGESTERONE ACETATE 5 MG/1
10 TABLET ORAL DAILY
Qty: 10 TABLET | Refills: 0 | Status: SHIPPED | OUTPATIENT
Start: 2022-01-06 | End: 2022-03-22

## 2022-01-06 NOTE — PROGRESS NOTES
[facility], [address] [place], [place]    Assessment/Plan:  No problem-specific Assessment & Plan notes found for this encounter  Diagnoses and all orders for this visit:    Amenorrhea  -     medroxyPROGESTERone (PROVERA) 5 mg tablet; Take 2 tablets (10 mg total) by mouth daily for 5 days    Insulin resistance  -     metFORMIN (GLUCOPHAGE-XR) 500 mg 24 hr tablet; Take 1 tablet (500 mg total) by mouth 2 (two) times a day with meals  -     risk/benefit reviewed  S E  and need to call office before stopping medication if having side effects  -     -  Follow-up in 3 months  BMI 45 0-49 9, adult ([facility])  - weight reduction  Will recommend weight management, has been referred to in past      Other orders  -     naproxen (NAPROSYN) 500 mg tablet; Take 500 mg by mouth 2 (two) times a day with meals    - Will do provera challenge  Can take up to 7-11 days after last dose of medication for menses  If no menses recommend referral to Dr Shan Olsen  - Weight loss recommended, Referral to weight management has been given in the past  Would recommend formal plan if unsuccessful  - Metformin started needs to work up to 1000 mg and will repeat labs in future  - to call office with update      Subjective:   Talita Whiting is a 29 y o  [place] female  CC: amenorrhea    HPI: Last period is unknown  Was on some form of birth control for years to control cycles  Started OCP around age 16 and then went on Depo and has not had period since  On depo for 7 years  Initiated for birth control  Was hyperthyroid in October, but has since had medications adjustment and is wnl  She states that she had lost some weight, but with the holiday gained  She states that she and  are working on weight loss together  We discussed importance of weight and health during pregnancy  Recommend that she consider weight management and that we start her on Metformin  Will do Provera challenge    Based on age and desire for pregnancy will recommend LISA      ROS: Review of Systems   Constitutional: Negative for activity change, appetite change, fatigue and fever  Respiratory: Negative for cough and shortness of breath  Cardiovascular: Negative for chest pain, palpitations and leg swelling  Gastrointestinal: Negative for constipation and diarrhea  Genitourinary: Positive for menstrual problem  Negative for difficulty urinating, frequency, pelvic pain, vaginal bleeding, vaginal discharge and vaginal pain  Neurological: Negative for light-headedness and headaches  Psychiatric/Behavioral: The patient is not nervous/anxious  PFSH: The following portions of the patient's history were reviewed and updated as appropriate: allergies, current medications, past family history, past medical history, obstetric history, gynecologic history, past social history, past surgical history and problem list        Objective:  /78 (BP Location: Left arm, Patient Position: Sitting, Cuff Size: Large)   Ht 5' 5" (1 651 m)   LMP  (LMP Unknown)   BMI 48 92 kg/m²    Physical Exam  Vitals reviewed  Constitutional:       Appearance: Normal appearance  Cardiovascular:      Rate and Rhythm: Normal rate  Pulmonary:      Effort: Pulmonary effort is normal    Musculoskeletal:         General: Normal range of motion  Cervical back: Normal range of motion  Neurological:      Mental Status: She is alert and oriented to person, place, and time     Psychiatric:         Mood and Affect: Mood normal          Behavior: Behavior normal          Judgment: Judgment normal

## 2022-02-18 ENCOUNTER — APPOINTMENT (OUTPATIENT)
Dept: LAB | Age: 37
End: 2022-02-18
Payer: COMMERCIAL

## 2022-02-18 DIAGNOSIS — Z13.29 SCREENING FOR THYROID DISORDER: ICD-10-CM

## 2022-02-18 DIAGNOSIS — Z13.0 SCREENING FOR IRON DEFICIENCY ANEMIA: ICD-10-CM

## 2022-02-18 DIAGNOSIS — Z01.83 ENCOUNTER FOR BLOOD TYPING: ICD-10-CM

## 2022-02-18 DIAGNOSIS — Z11.59 SCREENING FOR VIRAL DISEASE: ICD-10-CM

## 2022-02-18 DIAGNOSIS — E28.2 POLYCYSTIC OVARIES: ICD-10-CM

## 2022-02-18 DIAGNOSIS — Z31.41 FERTILITY TESTING: ICD-10-CM

## 2022-02-18 DIAGNOSIS — Z11.3 SCREENING FOR STDS (SEXUALLY TRANSMITTED DISEASES): ICD-10-CM

## 2022-02-18 LAB
ABO GROUP BLD: NORMAL
ALBUMIN SERPL BCP-MCNC: 3.8 G/DL (ref 3.5–5)
ALP SERPL-CCNC: 95 U/L (ref 46–116)
ALT SERPL W P-5'-P-CCNC: 42 U/L (ref 12–78)
ANION GAP SERPL CALCULATED.3IONS-SCNC: 6 MMOL/L (ref 4–13)
AST SERPL W P-5'-P-CCNC: 24 U/L (ref 5–45)
BASOPHILS # BLD AUTO: 0.06 THOUSANDS/ΜL (ref 0–0.1)
BASOPHILS NFR BLD AUTO: 1 % (ref 0–1)
BILIRUB SERPL-MCNC: 0.44 MG/DL (ref 0.2–1)
BLD GP AB SCN SERPL QL: NEGATIVE
BUN SERPL-MCNC: 16 MG/DL (ref 5–25)
CALCIUM SERPL-MCNC: 9.4 MG/DL (ref 8.3–10.1)
CHLORIDE SERPL-SCNC: 106 MMOL/L (ref 100–108)
CHOLEST SERPL-MCNC: 233 MG/DL
CO2 SERPL-SCNC: 25 MMOL/L (ref 21–32)
CREAT SERPL-MCNC: 0.72 MG/DL (ref 0.6–1.3)
EOSINOPHIL # BLD AUTO: 0.18 THOUSAND/ΜL (ref 0–0.61)
EOSINOPHIL NFR BLD AUTO: 2 % (ref 0–6)
ERYTHROCYTE [DISTWIDTH] IN BLOOD BY AUTOMATED COUNT: 13.1 % (ref 11.6–15.1)
EST. AVERAGE GLUCOSE BLD GHB EST-MCNC: 117 MG/DL
GFR SERPL CREATININE-BSD FRML MDRD: 108 ML/MIN/1.73SQ M
GLUCOSE P FAST SERPL-MCNC: 87 MG/DL (ref 65–99)
HBA1C MFR BLD: 5.7 %
HBV SURFACE AG SER QL: NORMAL
HCT VFR BLD AUTO: 43.5 % (ref 34.8–46.1)
HCV AB SER QL: NORMAL
HDLC SERPL-MCNC: 38 MG/DL
HGB BLD-MCNC: 14.1 G/DL (ref 11.5–15.4)
IMM GRANULOCYTES # BLD AUTO: 0.05 THOUSAND/UL (ref 0–0.2)
IMM GRANULOCYTES NFR BLD AUTO: 1 % (ref 0–2)
INSULIN SERPL-ACNC: 25.6 MU/L (ref 3–25)
LDLC SERPL CALC-MCNC: 139 MG/DL (ref 0–100)
LYMPHOCYTES # BLD AUTO: 2.84 THOUSANDS/ΜL (ref 0.6–4.47)
LYMPHOCYTES NFR BLD AUTO: 30 % (ref 14–44)
MCH RBC QN AUTO: 28.4 PG (ref 26.8–34.3)
MCHC RBC AUTO-ENTMCNC: 32.4 G/DL (ref 31.4–37.4)
MCV RBC AUTO: 88 FL (ref 82–98)
MONOCYTES # BLD AUTO: 0.5 THOUSAND/ΜL (ref 0.17–1.22)
MONOCYTES NFR BLD AUTO: 5 % (ref 4–12)
NEUTROPHILS # BLD AUTO: 5.82 THOUSANDS/ΜL (ref 1.85–7.62)
NEUTS SEG NFR BLD AUTO: 61 % (ref 43–75)
NONHDLC SERPL-MCNC: 195 MG/DL
NRBC BLD AUTO-RTO: 0 /100 WBCS
PLATELET # BLD AUTO: 387 THOUSANDS/UL (ref 149–390)
PMV BLD AUTO: 10.6 FL (ref 8.9–12.7)
POTASSIUM SERPL-SCNC: 4 MMOL/L (ref 3.5–5.3)
PROT SERPL-MCNC: 8.3 G/DL (ref 6.4–8.2)
RBC # BLD AUTO: 4.97 MILLION/UL (ref 3.81–5.12)
RH BLD: NEGATIVE
RUBV IGG SERPL IA-ACNC: 32.3 IU/ML
SODIUM SERPL-SCNC: 137 MMOL/L (ref 136–145)
TRIGL SERPL-MCNC: 281 MG/DL
TSH SERPL DL<=0.05 MIU/L-ACNC: 1.15 UIU/ML (ref 0.36–3.74)
WBC # BLD AUTO: 9.45 THOUSAND/UL (ref 4.31–10.16)

## 2022-02-18 PROCEDURE — 84443 ASSAY THYROID STIM HORMONE: CPT

## 2022-02-18 PROCEDURE — 87340 HEPATITIS B SURFACE AG IA: CPT

## 2022-02-18 PROCEDURE — 36415 COLL VENOUS BLD VENIPUNCTURE: CPT

## 2022-02-18 PROCEDURE — 86803 HEPATITIS C AB TEST: CPT

## 2022-02-18 PROCEDURE — 86762 RUBELLA ANTIBODY: CPT

## 2022-02-18 PROCEDURE — 80061 LIPID PANEL: CPT

## 2022-02-18 PROCEDURE — 83036 HEMOGLOBIN GLYCOSYLATED A1C: CPT

## 2022-02-18 PROCEDURE — 83525 ASSAY OF INSULIN: CPT

## 2022-02-18 PROCEDURE — 86901 BLOOD TYPING SEROLOGIC RH(D): CPT

## 2022-02-18 PROCEDURE — 82397 CHEMILUMINESCENT ASSAY: CPT

## 2022-02-18 PROCEDURE — 85025 COMPLETE CBC W/AUTO DIFF WBC: CPT

## 2022-02-18 PROCEDURE — 86900 BLOOD TYPING SEROLOGIC ABO: CPT

## 2022-02-18 PROCEDURE — 86787 VARICELLA-ZOSTER ANTIBODY: CPT

## 2022-02-18 PROCEDURE — 86850 RBC ANTIBODY SCREEN: CPT

## 2022-02-18 PROCEDURE — 80053 COMPREHEN METABOLIC PANEL: CPT

## 2022-02-18 PROCEDURE — 83498 ASY HYDROXYPROGESTERONE 17-D: CPT

## 2022-02-21 LAB — VZV IGG SER IA-ACNC: NORMAL

## 2022-02-24 LAB — 17OHP SERPL-MCNC: 28 NG/DL

## 2022-02-25 LAB — MIS SERPL-MCNC: 7.27 NG/ML

## 2022-03-08 ENCOUNTER — HOSPITAL ENCOUNTER (OUTPATIENT)
Dept: RADIOLOGY | Facility: HOSPITAL | Age: 37
Discharge: HOME | End: 2022-03-08
Attending: OBSTETRICS & GYNECOLOGY | Admitting: RADIOLOGY
Payer: COMMERCIAL

## 2022-03-08 DIAGNOSIS — Z31.41 ENCOUNTER FOR FERTILITY TESTING: ICD-10-CM

## 2022-03-08 PROCEDURE — 58340 CATHETER FOR HYSTEROGRAPHY: CPT

## 2022-03-08 PROCEDURE — 74740 X-RAY FEMALE GENITAL TRACT: CPT

## 2022-03-08 RX ADMIN — IOHEXOL 5 ML: 240 INJECTION, SOLUTION INTRATHECAL; INTRAVASCULAR; INTRAVENOUS; ORAL at 12:15

## 2022-03-16 ENCOUNTER — APPOINTMENT (OUTPATIENT)
Dept: LAB | Age: 37
End: 2022-03-16
Payer: COMMERCIAL

## 2022-03-16 DIAGNOSIS — Z11.3 SCREENING FOR STDS (SEXUALLY TRANSMITTED DISEASES): ICD-10-CM

## 2022-03-16 DIAGNOSIS — Z11.4 SCREENING FOR HUMAN IMMUNODEFICIENCY VIRUS: ICD-10-CM

## 2022-03-16 DIAGNOSIS — Z11.4 SCREENING FOR HIV (HUMAN IMMUNODEFICIENCY VIRUS): ICD-10-CM

## 2022-03-16 PROCEDURE — 86592 SYPHILIS TEST NON-TREP QUAL: CPT

## 2022-03-16 PROCEDURE — 36415 COLL VENOUS BLD VENIPUNCTURE: CPT

## 2022-03-16 PROCEDURE — 87389 HIV-1 AG W/HIV-1&-2 AB AG IA: CPT

## 2022-03-17 LAB
HIV 1+2 AB+HIV1 P24 AG SERPL QL IA: NORMAL
RPR SER QL: NORMAL

## 2022-03-18 ENCOUNTER — APPOINTMENT (OUTPATIENT)
Dept: LAB | Age: 37
End: 2022-03-18
Payer: COMMERCIAL

## 2022-03-18 DIAGNOSIS — Z11.4 SCREENING FOR HIV (HUMAN IMMUNODEFICIENCY VIRUS): ICD-10-CM

## 2022-03-18 DIAGNOSIS — Z11.3 SCREENING FOR STDS (SEXUALLY TRANSMITTED DISEASES): ICD-10-CM

## 2022-03-18 DIAGNOSIS — Z11.4 SCREENING FOR HUMAN IMMUNODEFICIENCY VIRUS: ICD-10-CM

## 2022-03-18 PROCEDURE — 87491 CHLMYD TRACH DNA AMP PROBE: CPT

## 2022-03-18 PROCEDURE — 87591 N.GONORRHOEAE DNA AMP PROB: CPT

## 2022-03-19 LAB
C TRACH DNA SPEC QL NAA+PROBE: NEGATIVE
N GONORRHOEA DNA SPEC QL NAA+PROBE: NEGATIVE

## 2022-03-22 ENCOUNTER — TELEMEDICINE (OUTPATIENT)
Dept: BARIATRICS | Facility: CLINIC | Age: 37
End: 2022-03-22
Payer: COMMERCIAL

## 2022-03-22 VITALS — WEIGHT: 293 LBS | HEIGHT: 65 IN | BODY MASS INDEX: 48.82 KG/M2

## 2022-03-22 DIAGNOSIS — E66.01 CLASS 3 SEVERE OBESITY DUE TO EXCESS CALORIES WITHOUT SERIOUS COMORBIDITY WITH BODY MASS INDEX (BMI) OF 50.0 TO 59.9 IN ADULT (HCC): ICD-10-CM

## 2022-03-22 DIAGNOSIS — E03.9 HYPOTHYROIDISM, UNSPECIFIED TYPE: ICD-10-CM

## 2022-03-22 DIAGNOSIS — R63.5 ABNORMAL WEIGHT GAIN: Primary | ICD-10-CM

## 2022-03-22 DIAGNOSIS — E66.01 CLASS 3 SEVERE OBESITY WITH BODY MASS INDEX (BMI) OF 50.0 TO 59.9 IN ADULT, UNSPECIFIED OBESITY TYPE, UNSPECIFIED WHETHER SERIOUS COMORBIDITY PRESENT (HCC): ICD-10-CM

## 2022-03-22 DIAGNOSIS — E88.81 INSULIN RESISTANCE: ICD-10-CM

## 2022-03-22 PROBLEM — E66.813 CLASS 3 SEVERE OBESITY DUE TO EXCESS CALORIES WITHOUT SERIOUS COMORBIDITY WITH BODY MASS INDEX (BMI) OF 50.0 TO 59.9 IN ADULT (HCC): Status: ACTIVE | Noted: 2018-01-10

## 2022-03-22 PROBLEM — E88.819 INSULIN RESISTANCE: Status: ACTIVE | Noted: 2022-03-22

## 2022-03-22 PROBLEM — M54.2 NECK PAIN: Status: ACTIVE | Noted: 2022-02-24

## 2022-03-22 PROCEDURE — 99204 OFFICE O/P NEW MOD 45 MIN: CPT | Performed by: PHYSICIAN ASSISTANT

## 2022-03-22 RX ORDER — METHOCARBAMOL 500 MG/1
500 TABLET, FILM COATED ORAL 3 TIMES DAILY PRN
COMMUNITY
Start: 2022-02-24 | End: 2022-05-26

## 2022-03-22 RX ORDER — METFORMIN HYDROCHLORIDE 500 MG/1
TABLET, EXTENDED RELEASE ORAL
Qty: 90 TABLET | Refills: 2 | Status: SHIPPED | OUTPATIENT
Start: 2022-03-22 | End: 2022-04-28 | Stop reason: SDUPTHER

## 2022-03-22 NOTE — ASSESSMENT & PLAN NOTE
Metformin XR increased to 1500mg a day  -should improve with weight loss, dietary, and lifestyle changes

## 2022-03-22 NOTE — PROGRESS NOTES
Subjective:   Chief Complaint   Patient presents with    Consult     MWM     Telemedicine consent    Patient: Valentina Freeman  Provider: Camila Tom PA-C  Provider located at [address] [place] [address]  [phone]    The patient was identified by name and date of birth  Valentina Freeman was informed that this is a telemedicine visit and that the visit is being conducted through [address] Now and patient was informed that this is a secure, HIPAA-compliant platform  She agrees to proceed     My office door was closed  No one else was in the room  She acknowledged consent and understanding of privacy and security of the video platform  The patient has agreed to participate and understands they can discontinue the visit at any time  The patient is located in the [facility] which I do hold an active license  Patient is aware this is a billable service  Patient ID: Valentina Freeman  is a 40 y o  female with excess weight/obesity here to pursue weight management  Wants to get pregnant currently  Stopped depo Oct 2020  Was placed on metformin 1000mg day by GYN for multiple months and now seeing fertility specialist  She needs to lower BMI to under 40 to be able to have IVF  HPI:  Obesity/Excess Weight:  Severity: Very Severe  Onset:  Was heavier in highschool but gained more in college    Modifiers: Diet and Exercise and Commercial Weight Loss Programs-ie   Weight Watchers, Homer Lown, Nutrisystem, etc  WW lost about 40 pounds but then gained back and more  Contributing factors: Poor Food Choices and Insufficient Physical Activity  Associated symptoms: fertility     Goals:43 pound weight loss, ultimately BMI under 40  Hydration:64 ounces water, milk 1 a day skim, 1-2 coffee week  Alcohol: 1 week  Exercise: 30 min daily 5 x week    Colonoscopy-Not applicable    The following portions of the patient's history were reviewed and updated as appropriate: past family history, past social history, past surgical history and problem list     Review of Systems   Constitutional: Positive for fatigue  Negative for chills and fever  Respiratory: Negative for shortness of breath  Cardiovascular: Negative for chest pain and palpitations  Gastrointestinal: Negative for abdominal pain, constipation, diarrhea and vomiting  Gerd with triggering foods     Genitourinary: Positive for menstrual problem  Musculoskeletal: Positive for neck pain  Negative for arthralgias and back pain  Skin: Negative for rash  Psychiatric/Behavioral: Negative for dysphoric mood  The patient is nervous/anxious (on celexa and controlled)  Objective:    Ht 5' 5" (1 651 m)   Wt (!) 137 kg (303 lb)   BMI 50 42 kg/m²     Physical Exam  Constitutional:       General: She is not in acute distress  Appearance: She is well-developed  She is not diaphoretic  HENT:      Head: Normocephalic and atraumatic  Eyes:      Conjunctiva/sclera: Conjunctivae normal    Pulmonary:      Effort: Pulmonary effort is normal    Neurological:      Mental Status: She is alert and oriented to person, place, and time  Psychiatric:         Behavior: Behavior normal          Assessment/Plan:    Class 3 severe obesity due to excess calories without serious comorbidity with body mass index (BMI) of 50 0 to 59 9 in adult [facility])  -Discussed options of HealthyCORE-Intensive Lifestyle Intervention Program, Very Low Calorie Diet-VLCD, Conservative Program, Raul-En-Y Gastric Bypass and Vertical Sleeve Gastrectomy and the role of weight loss medications   -Initial weight loss goal of 5-10% weight loss for improved health   - Labs reviewed from 2/18/22 and A1C 5 7 and fasting insulin elevated  - STOP BANG-3/8  -Patient is interested in pursuing Raul-En-Y Gastric Bypass and Vertical Sleeve Gastrectomy  She plans on continuing with WW to see if she can lose weight      - recommend increasing metformin to 1500mg daily to help with weight loss and lower A1C/insulin          Hypothyroidism  Continue levothyroxine    Insulin resistance  Metformin XR increased to 1500mg a day  -should improve with weight loss, dietary, and lifestyle changes        Follow up in approximately 2 weeks with Surgical Dietician, Surgical  and Surgical   Goals:  Food log (ie ) www myfitnesspal com,sparkpeople  com,loseit com,calorieking  com,etc  baritastic  No sugary beverages  At least 64oz of water daily  Increase physical activity by 10 minutes daily  Gradually increase physical activity to a goal of 5 days per week for 30 minutes of MODERATE intensity PLUS 2 days per week of FULL BODY resistance training      Diagnoses and all orders for this visit:    Abnormal weight gain    Insulin resistance  -     metFORMIN (GLUCOPHAGE-XR) 500 mg 24 hr tablet; Take 1 pill in the morning and 2 pills at night  Class 3 severe obesity with body mass index (BMI) of 50 0 to 59 9 in adult, unspecified obesity type, unspecified whether serious comorbidity present ([facility] )  -     Ambulatory Referral to Weight Management    Class 3 severe obesity due to excess calories without serious comorbidity with body mass index (BMI) of 50 0 to 59 9 in adult [facility])    Hypothyroidism, unspecified type    Other orders  -     methocarbamol (ROBAXIN) 500 mg tablet;  Take 500 mg by mouth Three times daily as needed

## 2022-03-22 NOTE — ASSESSMENT & PLAN NOTE
-Discussed options of HealthyCORE-Intensive Lifestyle Intervention Program, Very Low Calorie Diet-VLCD, Conservative Program, Raul-En-Y Gastric Bypass and Vertical Sleeve Gastrectomy and the role of weight loss medications   -Initial weight loss goal of 5-10% weight loss for improved health   - Labs reviewed from 2/18/22 and A1C 5 7 and fasting insulin elevated  - STOP BANG-3/8  -Patient is interested in pursuing Raul-En-Y Gastric Bypass and Vertical Sleeve Gastrectomy  She plans on continuing with WW to see if she can lose weight      - recommend increasing metformin to 1500mg daily to help with weight loss and lower A1C/insulin

## 2022-04-04 DIAGNOSIS — E03.9 HYPOTHYROIDISM, UNSPECIFIED TYPE: ICD-10-CM

## 2022-04-04 RX ORDER — LEVOTHYROXINE SODIUM 0.12 MG/1
TABLET ORAL
Qty: 90 TABLET | Refills: 1 | Status: SHIPPED | OUTPATIENT
Start: 2022-04-04 | End: 2022-06-27 | Stop reason: SDUPTHER

## 2022-04-26 ENCOUNTER — TELEPHONE (OUTPATIENT)
Dept: PERINATAL CARE | Facility: OTHER | Age: 37
End: 2022-04-26

## 2022-04-26 NOTE — TELEPHONE ENCOUNTER
Pt is requesting to see you for a preconception virtual visits, Any suggestions as to when you would be able to see her ?

## 2022-04-28 ENCOUNTER — OFFICE VISIT (OUTPATIENT)
Dept: BARIATRICS | Facility: CLINIC | Age: 37
End: 2022-04-28
Payer: COMMERCIAL

## 2022-04-28 VITALS
SYSTOLIC BLOOD PRESSURE: 138 MMHG | WEIGHT: 293 LBS | RESPIRATION RATE: 16 BRPM | TEMPERATURE: 97.3 F | HEIGHT: 65 IN | DIASTOLIC BLOOD PRESSURE: 76 MMHG | BODY MASS INDEX: 48.82 KG/M2 | HEART RATE: 91 BPM

## 2022-04-28 DIAGNOSIS — E66.01 CLASS 3 SEVERE OBESITY DUE TO EXCESS CALORIES WITHOUT SERIOUS COMORBIDITY WITH BODY MASS INDEX (BMI) OF 45.0 TO 49.9 IN ADULT (HCC): Primary | ICD-10-CM

## 2022-04-28 DIAGNOSIS — E88.81 INSULIN RESISTANCE: ICD-10-CM

## 2022-04-28 PROCEDURE — 99214 OFFICE O/P EST MOD 30 MIN: CPT | Performed by: PHYSICIAN ASSISTANT

## 2022-04-28 RX ORDER — MELATONIN
1000 DAILY
COMMUNITY

## 2022-04-28 RX ORDER — MULTIVIT WITH MINERALS/LUTEIN
1000 TABLET ORAL DAILY
COMMUNITY

## 2022-04-28 RX ORDER — METFORMIN HYDROCHLORIDE 500 MG/1
TABLET, EXTENDED RELEASE ORAL
Qty: 120 TABLET | Refills: 2 | Status: SHIPPED | OUTPATIENT
Start: 2022-04-28

## 2022-04-28 RX ORDER — CRANBERRY FRUIT EXTRACT 650 MG
1 CAPSULE ORAL DAILY
COMMUNITY

## 2022-04-28 NOTE — ASSESSMENT & PLAN NOTE
-Patient is pursuing Conservative Program (her insurance does not cover surgical weight loss)  -Initial weight loss goal of 5-10% weight loss for improved health  -Screening labs  - Labs reviewed from 2/18/22 and A1C 5 7 and fasting insulin elevated  - She plans on continuing with WW to see if she can lose weight  - recommend increasing metformin to 2000mg daily to help with weight loss and lower A1C/insulin     Initial: 303  Current: 293 9  Change:-9 1    Recommendations:  -1800 calorie diet recommended 1-2 pounds weight loss  -to start food logging  Recommend she continue with low carb/low sugar diet    Medications:  -continue metformin but increase 1000 BID  -discussed other medication options but since she plan on getting pregnant and has amenorrhea I am concerned about starting phentermine  Would not use saxenda or topamax   She has been losing appropriate amount of weight so recommend she continue she continue lifestyle changes

## 2022-04-28 NOTE — PROGRESS NOTES
sAssessment/Plan:    Class 3 severe obesity due to excess calories without serious comorbidity with body mass index (BMI) of 45 0 to 49 9 in adult [facility])  -Patient is pursuing Conservative Program (her insurance does not cover surgical weight loss)  -Initial weight loss goal of 5-10% weight loss for improved health  -Screening labs  - Labs reviewed from 2/18/22 and A1C 5 7 and fasting insulin elevated  - She plans on continuing with WW to see if she can lose weight  - recommend increasing metformin to 2000mg daily to help with weight loss and lower A1C/insulin     Initial: 303  Current: 293 9  Change:-9 1    Recommendations:  -1800 calorie diet recommended 1-2 pounds weight loss  -to start food logging  Recommend she continue with low carb/low sugar diet    Medications:  -continue metformin but increase 1000 BID  -discussed other medication options but since she plan on getting pregnant and has amenorrhea I am concerned about starting phentermine  Would not use saxenda or topamax  She has been losing appropriate amount of weight so recommend she continue she continue lifestyle changes          Insulin resistance  Increase metformin 1000 BID        Follow up in approximately 2 months with Non-Surgical Physician/Advanced Practitioner  Diagnoses and all orders for this visit:    Class 3 severe obesity due to excess calories without serious comorbidity with body mass index (BMI) of 45 0 to 49 9 in adult ([facility])    Insulin resistance  -     metFORMIN (GLUCOPHAGE-XR) 500 mg 24 hr tablet; Take 2 pill in the morning and 2 pills at night  Other orders  -     Ascorbic Acid (vitamin C) 1000 MG tablet; Take 1,000 mg by mouth daily  -     co-enzyme Q-10 30 MG capsule; Take 30 mg by mouth 3 (three) times a day  -     cholecalciferol (VITAMIN D3) 1,000 units tablet; Take 1,000 Units by mouth daily  -     Inositol-D Chiro-Inositol (Ovasitol) 2000-50 MG PACK; Take 1 Scoop by mouth in the morning  -     NON FORMULARY;  Take 1 capsule by mouth in the morning Eunatural, insotol and vitex          Subjective:   Chief Complaint   Patient presents with    Follow-up     discuss medications        Patient ID: Lesa Somers  is a 40 y o  female with excess weight/obesity here to pursue weight managment  Patient is pursuing Conservative Program      HPI   She is trying to get pregnant  She will be seeing MFM at [facility]  She was told she has PCOS  She is taking metformin 1500 mg day for about 1 month  They did just reordered labs for here  Food logging:doing [employer]  Increased appetite/cravings:sometimes  Fruit/Vegetable servings:  Exercise:doing low impact 5 x week  Hydration: 64 ounces of water    Colonoscopy-Not applicable    The following portions of the patient's history were reviewed and updated as appropriate:   She  has a past medical history of Herniated disc, cervical   She   Patient Active Problem List    Diagnosis Date Noted    Insulin resistance 03/22/2022    Neck pain 02/24/2022    Amenorrhea 12/19/2021    Cervical radiculopathy 08/27/2021    History of positive PPD 08/17/2018    Anxiety 01/10/2018    Hypothyroidism 01/10/2018    Class 3 severe obesity due to excess calories without serious comorbidity with body mass index (BMI) of 45 0 to 49 9 in adult [facility]) 01/10/2018     She  has a past surgical history that includes Breast surgery and Tonsillectomy  Her family history includes No Known Problems in her mother; Ovarian cancer in her maternal grandmother  She  reports that she has never smoked  She has never used smokeless tobacco  She reports current alcohol use  She reports that she does not use drugs    Current Outpatient Medications   Medication Sig Dispense Refill    Ascorbic Acid (vitamin C) 1000 MG tablet Take 1,000 mg by mouth daily      cetirizine (ZyrTEC Allergy) 10 mg tablet Take 10 mg by mouth daily      cholecalciferol (VITAMIN D3) 1,000 units tablet Take 1,000 Units by mouth daily      citalopram (CeleXA) 40 mg tablet TAKE 1 TABLET BY MOUTH DAILY GENERIC EQUIVALENT FOR CELEXA 90 tablet 0    co-enzyme Q-10 30 MG capsule Take 30 mg by mouth 3 (three) times a day      Inositol-D Chiro-Inositol (Ovasitol) 2000-50 MG PACK Take 1 Scoop by mouth in the morning      levothyroxine 125 mcg tablet TAKE 1 TABLET BY MOUTH EVERY DAY 90 tablet 1    metFORMIN (GLUCOPHAGE-XR) 500 mg 24 hr tablet Take 2 pill in the morning and 2 pills at night  120 tablet 2    methocarbamol (ROBAXIN) 500 mg tablet Take 500 mg by mouth Three times daily as needed      naproxen (NAPROSYN) 500 mg tablet Take 500 mg by mouth 2 (two) times a day with meals      NON FORMULARY Take 1 capsule by mouth in the morning Eunatural, insotol and vitex      olopatadine HCl (PATADAY) 0 2 % opth drops        No current facility-administered medications for this visit  Current Outpatient Medications on File Prior to Visit   Medication Sig    Ascorbic Acid (vitamin C) 1000 MG tablet Take 1,000 mg by mouth daily    cetirizine (ZyrTEC Allergy) 10 mg tablet Take 10 mg by mouth daily    cholecalciferol (VITAMIN D3) 1,000 units tablet Take 1,000 Units by mouth daily    citalopram (CeleXA) 40 mg tablet TAKE 1 TABLET BY MOUTH DAILY GENERIC EQUIVALENT FOR CELEXA    co-enzyme Q-10 30 MG capsule Take 30 mg by mouth 3 (three) times a day    Inositol-D Chiro-Inositol (Ovasitol) 2000-50 MG PACK Take 1 Scoop by mouth in the morning    levothyroxine 125 mcg tablet TAKE 1 TABLET BY MOUTH EVERY DAY    methocarbamol (ROBAXIN) 500 mg tablet Take 500 mg by mouth Three times daily as needed    naproxen (NAPROSYN) 500 mg tablet Take 500 mg by mouth 2 (two) times a day with meals    NON FORMULARY Take 1 capsule by mouth in the morning Eunatural, insotol and vitex    olopatadine HCl (PATADAY) 0 2 % opth drops     [DISCONTINUED] metFORMIN (GLUCOPHAGE-XR) 500 mg 24 hr tablet Take 1 pill in the morning and 2 pills at night       No current facility-administered medications on file prior to visit  She is allergic to erythromycin       Review of Systems   Constitutional: Negative for chills and fever  HENT: Negative for sore throat  Eyes: Negative for pain and visual disturbance  Respiratory: Negative for cough and shortness of breath  Cardiovascular: Negative for chest pain and palpitations  Gastrointestinal: Negative for abdominal pain and vomiting  Genitourinary: Positive for menstrual problem  Negative for dysuria  Musculoskeletal: Negative for arthralgias and back pain  Skin: Negative for color change and rash  Neurological: Negative for seizures and headaches  Psychiatric/Behavioral: Negative for dysphoric mood  The patient is not nervous/anxious  All other systems reviewed and are negative  Objective:    /76 (BP Location: Left arm, Patient Position: Sitting, Cuff Size: Adult)   Pulse 91   Temp (!) 97 3 °F (36 3 °C) (Tympanic)   Resp 16   Ht 5' 5" (1 651 m)   Wt 133 kg (293 lb 14 4 oz)   BMI 48 91 kg/m²      Physical Exam  Vitals and nursing note reviewed  Constitutional:       General: She is not in acute distress  Appearance: She is well-developed  She is obese  HENT:      Head: Normocephalic and atraumatic  Eyes:      Conjunctiva/sclera: Conjunctivae normal    Neck:      Thyroid: No thyromegaly  Pulmonary:      Effort: Pulmonary effort is normal  No respiratory distress  Skin:     Findings: No rash (visible)  Neurological:      Mental Status: She is alert and oriented to person, place, and time     Psychiatric:         Mood and Affect: Mood normal          Behavior: Behavior normal

## 2022-05-03 ENCOUNTER — TELEPHONE (OUTPATIENT)
Dept: PERINATAL CARE | Facility: CLINIC | Age: 37
End: 2022-05-03

## 2022-05-03 NOTE — PROGRESS NOTES
PRECONCEPTION CONSULTATION: MATERNAL-FETAL MEDICINE     Virtual Regular Visit    Verification of patient location: [place] is located in the following state in which I hold an active license [place]  The patient was identified by name and date of birth  She was informed that this is a telemedicine visit and that the visit is being conducted through [address] [facility] and patient was informed that this is a secure, HIPAA-compliant platform  She agrees to proceed     My office door was closed  No one else was in the room  She acknowledged consent and understanding of privacy and security of the video platform  The patient has agreed to participate and understands they can discontinue the visit at any time  Patient is aware this is a billable service  Assessment and Plan: Ms Syed Goff is a 40y o  year-old Casi Jamel here for preconception counseling  By issue:    Problem List Items Addressed This Visit        Other    BMI 45 0-49 9, adult ([address] ) - Primary     Working with weight management  Ericka Lopez has BMI cutoff which she is working towards  Seeing Amanda parr who is willing to treat at this current BMI  Advised healthiest possible weight by healthiest means possible  Open to referral to Diabetes in Pregnancy Program (for indication of prediabetes) to follow fasting glucoses  Encouraged consideration of working to achieve a healthier weight before conceiving despite different BMI cutoff, as she has made some positive changes and lost 10 pounds down to 293 at last visit! Discussed that while BMI is not the sole determinant of health, maternal BMI above 30 in pregnancy confers increased risks of preeclampsia, GDM, cardiac dysfunction, sleep apnea,  delivery, and VTE, and fetal risks include miscarriage, fetal anomalies (along with reduced detection), and stillbirth, macrosomia and impaired growth  Growth assessment is advised in the third trimester    For women with prepregnancy BMI >= 40, we advise weekly antepartum fetal surveillance beginning at 34 weeks  I encouraged regular aerobic and strength-conditioning exercises in and out of pregnancy, given minimal risks and given potential benefits (improvement or maintainance of physical fitness, weight management, reduced risk of GDM in obese women, and psychologic well-being)  An exercise program that leads to an eventual goal of moderate-intensity exercise for at least 20-30 minutes per day on most or all days of the week (150 minutes per week) should be developed and adjusted as medically indicated  Examples of exercises that are safe and beneficial are walking, stationary cycling, dancing, resistance exercises, and water activity  Some modification to exercise routines may be necessary because of normal anatomic and physiologic changes and fetal requirements  The "talk test" can be used to guide self-monitoring during exercise whereby she should reduce intensity or stop the exercise if she cannot talk through it; warning signs to discontinue exercise are bleeding, leaking, abdominal pain, painful regular contractions, dyspnea before exertion, headache, dizziness, chest pain, or calf pain and swelling  Walking is a wonderful form of exercise  Encounter for preconception consultation     Discussed aneuploidy screening, schedule of prenatal care, comanagement alongside MFM  We discussed that  risks associated with assisted reproductive technology (ART) and ovulation induction which include multifetal gestations, prematurity, low birth weight, small for gestational age,  mortality,  delivery, placenta previa, abruptio placentae, preeclampsia, and birth defects  Although these risks are much higher in multifetal gestations, even singletons achieved with ART and ovulation induction may be at higher risk than singletons from naturally occurring pregnancies    Screening fetal echocardiogram is typically advised for women who conceived with the assistance of in vitro fertilization due to the increased risk of fetal structural cardiac abnormalities with IVF; this typically done at 22-24 weeks  This is done in addition to the standard ultrasounds offered by our office which include nuchal translucency ultrasound around 11-13 weeks followed by detailed anatomic survey with cervical length screening around 19-20 weeks  We additionally advise growth assessment at 28 and 34 weeks as well as weekly  surveillance beginning at 36 weeks and continuing until delivery  We discussed that with ART and ovulation induction, higher-order multifetal pregnancy may occur  Multifetal pregnancy and its associated outcomes is the greatest risk of ART and ovulation induction and, consequently, every effort should be made to achieve a andrade gestation  Risks of multifetal gestation increase dramatically with increasing number of fetuses and include: spontaneous  birth and complications of prematurity, low birth weight, cerebral palsy, and infant mortality  Maternal risks including hypertensive disorders, gestational diabetes, and  delivery risk with increasing number of fetuses  Therefore I strongly recommend all efforts be made to minimize the chance of multifetal gestation  These efforts include following professional society guidelines for number of embryos to be transferred during IVF, such as those from the [employer] ([employer]), and continuing to encourage and expand use of single-embryo transfer  Discussed age related concerns and reviewed slow increase in risk of aneuploidy with advancing year as a means to encourage continued efforts towards wellness:        Would qualify for aspirin prophylaxis once pregnant; risk factors for preeclampsia include BMI>30, AMA, nulliparity                     Referring physician:   Albino Abad Md  [address] [facility] [address] [facility],  [address] [place] [address]    Reason for consultation:   Chief Complaint   Patient presents with    Consult     trying to conceive since 2020; previously had been on Depo x7 years and prior to that had have very heavy menstrual cycles  Had been seeing Dr Elizabeth Mead followed by Erica Godoy  Put on metformin due to insulin issues as reason for not getting period  Seeing [facility]  No GI side effects of 2000mg metformin  Workup thus far: patent tubes  Dear Dr Juan Carlos Prieto,    Thank you for referring patient Olimpia Willams for preconception Maternal-Fetal Medicine consultation regarding elevated BMI and AMA  As you know, Ms Mdeardo Sosa is a 40y o  year-old     Her history is as follows:    Past Medical History:   Diagnosis Date    BMI 45 0-49 9, adult ([address] )     Herniated disc, cervical        Past Medical History Pertinent Negatives:   Diagnosis Date Noted    Alcoholism ([facility] ) 2022    Allergic 2019    Anemia 2019    Arthritis 2019    Asthma 2019    Cancer ([address] ) 2019    Chronic kidney disease 2019    Clotting disorder ([address] ) 2019    COPD (chronic obstructive pulmonary disease) ([address] ) 2019    Coronary artery disease 2019    Deep vein thrombosis ([address] ) 2022    Dementia ([address] ) 2019    Diabetes mellitus ([address] ) 2019    Diverticulitis of colon 2019    Eating disorder 2019    GERD (gastroesophageal reflux disease) 2019    Heart disease 2022    Heart murmur 2019    Hepatitis B 2022    Hepatitis C 2022    HIV disease ([address] ) 2022    HL (hearing loss) 2019    Hypertension 2019    Infectious viral hepatitis 2019    Inflammatory bowel disease 2019    Kidney stone 2019    Liver disease 2022    Memory loss 2019    Myocardial infarction ([address] ) 2019    Osteoporosis 2019    Otitis media 2019    Pneumonia 2019  Pulmonary embolism ([facility] ) 2022    Scoliosis 2019    Seizures ([place] [address] ) 2019    Shingles 2019    Stroke ([place] [address] ) 2019    Substance abuse ([place] [address] ) 2019    TIA (transient ischemic attack) 2022    Urinary tract infection 2019    Visual impairment 2019    Von Willebrand disease ([place] [address] ) 2022       Past Surgical History:   Procedure Laterality Date    BREAST SURGERY      reduction    TONSILLECTOMY         Past Surgical History Pertinent Negatives:   Procedure Date Noted    APPENDECTOMY 2019    BRAIN SURGERY 2019    CARDIAC SURGERY 2019    CARDIAC VALVE REPLACEMENT 2019     SECTION 2019    CHOLECYSTECTOMY 2019    COLON SURGERY 2019    CORONARY ARTERY BYPASS GRAFT 2019    EYE SURGERY 2019    FRACTURE SURGERY 2019    GASTROSTOMY 2019    HERNIA REPAIR 2019    HIP SURGERY 2019    HYSTERECTOMY 2019    JOINT REPLACEMENT 2019    KNEE SURGERY 2019    LYMPH NODE BIOPSY 2019    SMALL INTESTINE SURGERY 2019    SPINE SURGERY 2019    TUBAL LIGATION 2019    VENTRICULOPERITONEAL SHUNT 2019       OB History    Para Term  AB Living   0 0 0 0 0 0   SAB IAB Ectopic Multiple Live Births   0 0 0 0 0       Gynecologic history: No LMP recorded  (Menstrual status: Amenorrheic other)       Social History     Tobacco Use    Smoking status: Never Smoker    Smokeless tobacco: Never Used   Vaping Use    Vaping Use: Never used   Substance Use Topics    Alcohol use: Yes     Comment: soc    Drug use: No       Family History   Problem Relation Age of Onset    No Known Problems Mother     Ovarian cancer Maternal Grandmother        Family history per our office screening tool includes her father with hypertension but is negative for Ashkenazi Moravian ancestry, birth defects, blood clot in legs or lungs, diabetes, early-onset breast ovarian or colon cancer; Down syndrome or other chromosome problem, genetic condition or carrier state for cystic fibrosis, sickle cell, thalassemia, Prince-Sachs, or spinal muscular atrophy; hemophilia or von Willebrand's disease; preeclampsia, or opiate use disorder/addiction or overdose  Current Outpatient Medications:     cetirizine (ZyrTEC Allergy) 10 mg tablet, Take 10 mg by mouth daily, Disp: , Rfl:     Inositol-D Chiro-Inositol (Ovasitol) 2000-50 MG PACK, Take 1 Scoop by mouth in the morning, Disp: , Rfl:     levothyroxine 125 mcg tablet, TAKE 1 TABLET BY MOUTH EVERY DAY, Disp: 90 tablet, Rfl: 1    metFORMIN (GLUCOPHAGE-XR) 500 mg 24 hr tablet, Take 2 pill in the morning and 2 pills at night , Disp: 120 tablet, Rfl: 2    Ascorbic Acid (vitamin C) 1000 MG tablet, Take 1,000 mg by mouth daily, Disp: , Rfl:     cholecalciferol (VITAMIN D3) 1,000 units tablet, Take 1,000 Units by mouth daily, Disp: , Rfl:     citalopram (CeleXA) 40 mg tablet, TAKE 1 TABLET BY MOUTH DAILY GENERIC EQUIVALENT FOR CELEXA, Disp: 90 tablet, Rfl: 0    co-enzyme Q-10 30 MG capsule, Take 30 mg by mouth 3 (three) times a day, Disp: , Rfl:     methocarbamol (ROBAXIN) 500 mg tablet, Take 500 mg by mouth Three times daily as needed, Disp: , Rfl:     naproxen (NAPROSYN) 500 mg tablet, Take 500 mg by mouth 2 (two) times a day with meals, Disp: , Rfl:     NON FORMULARY, Take 1 capsule by mouth in the morning Eunatural, insotol and vitex, Disp: , Rfl:     olopatadine HCl (PATADAY) 0 2 % opth drops, , Disp: , Rfl:     Allergies   Allergen Reactions    Erythromycin Rash       Occupation: teacher; [employer]  Spouse/Partner Name: Rayne Alvarenga; Age 39; occupation: IT   He is overweight as well (240#, 5'6")  He has a poor semen analysis  He is working with a reproductive urologist     HPI    Review of Systems   All other systems reviewed and are negative  Vitals: There were no vitals taken for this visit    Physical Exam  Constitutional:       General: She is not in acute distress  Appearance: Normal appearance  She is not ill-appearing, toxic-appearing or diaphoretic  HENT:      Head: Normocephalic and atraumatic  Nose: No congestion or rhinorrhea  Eyes:      General: No scleral icterus  Right eye: No discharge  Left eye: No discharge  Extraocular Movements: Extraocular movements intact  Conjunctiva/sclera: Conjunctivae normal    Pulmonary:      Effort: Pulmonary effort is normal  No respiratory distress  Musculoskeletal:      Cervical back: Normal range of motion  Skin:     Coloration: Skin is not jaundiced or pale  Findings: No erythema, lesion or rash  Neurological:      General: No focal deficit present  Mental Status: She is alert and oriented to person, place, and time  Psychiatric:         Mood and Affect: Mood normal          Behavior: Behavior normal        -------------------------    The total time spent on this new patient on the encounter date was 71 minutes  This time included previsit service time of  10 minutes reviewing records and precharting, face-to-face (via virtual platform) service time of 51 minutes counseling regarding results and coordinating care, and post-service time of 10 minutes charting  At the conclusion of today's encounter, all questions were answered to her satisfaction  We will see her back once pregnant or sooner with any questions or concerns  Thank you very much for this kind referral and please do not hesitate to contact me with any further questions or concerns  Sincerely,    Nati Graham MD  Attending Physician, [place]    VIRTUAL VISIT [facility] verbally agrees to participate in [employer]    Patient is aware that [employer] could be limited without vital signs or the ability to perform a full hands-on physical exam  Jacques Bunn understands she or the provider may request at any time to terminate the video visit and request the patient to seek care or treatment in person

## 2022-05-04 ENCOUNTER — TELEMEDICINE (OUTPATIENT)
Dept: PERINATAL CARE | Facility: CLINIC | Age: 37
End: 2022-05-04
Payer: COMMERCIAL

## 2022-05-04 DIAGNOSIS — Z31.69 ENCOUNTER FOR PRECONCEPTION CONSULTATION: ICD-10-CM

## 2022-05-04 PROCEDURE — 99205 OFFICE O/P NEW HI 60 MIN: CPT | Performed by: OBSTETRICS & GYNECOLOGY

## 2022-05-04 NOTE — LETTER
May 5, 2022     Arjun Cunningham, [address] [id number] [facility]  [address]    Patient: Julius Cheney   YOB: 1985   Date of Visit: 5/4/2022       Dear Dr Gerardo Alcantara:    Thank you for referring Maisha Watkins to me for evaluation  Below are my notes for this consultation  If you have questions, please do not hesitate to call me  I look forward to following your patient along with you  Sincerely,        Clarissa Hooks MD        CC: No Recipients  Clarissa Hooks MD  5/5/2022  6:14 AM  Sign when Signing Visit  PRECONCEPTION CONSULTATION: MATERNAL-FETAL MEDICINE     Virtual Regular Visit    Verification of patient location: Julius Cheney is located in the following state in which I hold an active license [place]  The patient was identified by name and date of birth  She was informed that this is a telemedicine visit and that the visit is being conducted through [address] [place] [address] and patient was informed that this is a secure, HIPAA-compliant platform  She agrees to proceed     My office door was closed  No one else was in the room  She acknowledged consent and understanding of privacy and security of the video platform  The patient has agreed to participate and understands they can discontinue the visit at any time  Patient is aware this is a billable service  Assessment and Plan: Ms Ghada Valdez is a 40y o  year-old Christelle Prisca here for preconception counseling  By issue:    Problem List Items Addressed This Visit        Other    BMI 45 0-49 9, adult ([facility] [address] ) - Primary     Working with weight management  Ml Brown has BMI cutoff which she is working towards  Seeing Joann parr who is willing to treat at this current BMI  Advised healthiest possible weight by healthiest means possible  Open to referral to Diabetes in Pregnancy Program (for indication of prediabetes) to follow fasting glucoses    Encouraged consideration of working to achieve a healthier weight before conceiving despite different BMI cutoff, as she has made some positive changes and lost 10 pounds down to 293 at last visit! Discussed that while BMI is not the sole determinant of health, maternal BMI above 30 in pregnancy confers increased risks of preeclampsia, GDM, cardiac dysfunction, sleep apnea,  delivery, and VTE, and fetal risks include miscarriage, fetal anomalies (along with reduced detection), and stillbirth, macrosomia and impaired growth  Growth assessment is advised in the third trimester  For women with prepregnancy BMI >= 40, we advise weekly antepartum fetal surveillance beginning at 34 weeks  I encouraged regular aerobic and strength-conditioning exercises in and out of pregnancy, given minimal risks and given potential benefits (improvement or maintainance of physical fitness, weight management, reduced risk of GDM in obese women, and psychologic well-being)  An exercise program that leads to an eventual goal of moderate-intensity exercise for at least 20-30 minutes per day on most or all days of the week (150 minutes per week) should be developed and adjusted as medically indicated  Examples of exercises that are safe and beneficial are walking, stationary cycling, dancing, resistance exercises, and water activity  Some modification to exercise routines may be necessary because of normal anatomic and physiologic changes and fetal requirements  The "talk test" can be used to guide self-monitoring during exercise whereby she should reduce intensity or stop the exercise if she cannot talk through it; warning signs to discontinue exercise are bleeding, leaking, abdominal pain, painful regular contractions, dyspnea before exertion, headache, dizziness, chest pain, or calf pain and swelling  Walking is a wonderful form of exercise  Encounter for preconception consultation     Discussed aneuploidy screening, schedule of prenatal care, comanagement alongside MFM        We discussed that  risks associated with assisted reproductive technology (ART) and ovulation induction which include multifetal gestations, prematurity, low birth weight, small for gestational age,  mortality,  delivery, placenta previa, abruptio placentae, preeclampsia, and birth defects  Although these risks are much higher in multifetal gestations, even singletons achieved with ART and ovulation induction may be at higher risk than singletons from naturally occurring pregnancies  Screening fetal echocardiogram is typically advised for women who conceived with the assistance of in vitro fertilization due to the increased risk of fetal structural cardiac abnormalities with IVF; this typically done at 22-24 weeks  This is done in addition to the standard ultrasounds offered by our office which include nuchal translucency ultrasound around 11-13 weeks followed by detailed anatomic survey with cervical length screening around 19-20 weeks  We additionally advise growth assessment at 28 and 34 weeks as well as weekly  surveillance beginning at 36 weeks and continuing until delivery  We discussed that with ART and ovulation induction, higher-order multifetal pregnancy may occur  Multifetal pregnancy and its associated outcomes is the greatest risk of ART and ovulation induction and, consequently, every effort should be made to achieve a andrade gestation  Risks of multifetal gestation increase dramatically with increasing number of fetuses and include: spontaneous  birth and complications of prematurity, low birth weight, cerebral palsy, and infant mortality  Maternal risks including hypertensive disorders, gestational diabetes, and  delivery risk with increasing number of fetuses  Therefore I strongly recommend all efforts be made to minimize the chance of multifetal gestation    These efforts include following professional society guidelines for number of embryos to be transferred during IVF, such as those from the [address] for Reproductive Medicine (ASRM), and continuing to encourage and expand use of single-embryo transfer  Discussed age related concerns and reviewed slow increase in risk of aneuploidy with advancing year as a means to encourage continued efforts towards wellness:        Would qualify for aspirin prophylaxis once pregnant; risk factors for preeclampsia include BMI>30, AMA, nulliparity  Referring physician:   Sukhdev Horton Md  [facility], [address] [facility],  [address] [place] [address]    Reason for consultation:   Chief Complaint   Patient presents with   Reynaldo Villegas Consult     trying to conceive since 2020; previously had been on Depo x7 years and prior to that had have very heavy menstrual cycles  Had been seeing Dr Brenna Mondragon followed by Sony Jean-Baptiste  Put on metformin due to insulin issues as reason for not getting period  Seeing [facility]  No GI side effects of 2000mg metformin  Workup thus far: patent tubes  Dear Dr Jani Chávez,    Thank you for referring patient Justus Gutierrez for preconception Maternal-Fetal Medicine consultation regarding elevated BMI and AMA  As you know, Ms Key Ly is a 40y o  year-old     Her history is as follows:    Past Medical History:   Diagnosis Date    BMI 45 0-49 9, adult ([address] )     Herniated disc, cervical        Past Medical History Pertinent Negatives:   Diagnosis Date Noted    Alcoholism ([address] ) 2022    Allergic 2019    Anemia 2019    Arthritis 2019    Asthma 2019    Cancer ([address] ) 2019    Chronic kidney disease 2019    Clotting disorder ([address] ) 2019    COPD (chronic obstructive pulmonary disease) ([address] ) 2019    Coronary artery disease 2019    Deep vein thrombosis ([address] ) 2022    Dementia ([address] ) 2019    Diabetes mellitus ([address] ) 2019    Diverticulitis of colon 2019    Eating disorder 2019    GERD (gastroesophageal reflux disease) 2019    Heart disease 2022    Heart murmur 2019    Hepatitis B 2022    Hepatitis C 2022    HIV disease ([facility] [address] ) 2022    HL (hearing loss) 2019    Hypertension 2019    Infectious viral hepatitis 2019    Inflammatory bowel disease 2019    Kidney stone 2019    Liver disease 2022    Memory loss 2019    Myocardial infarction ([facility] [address] ) 2019    Osteoporosis 2019    Otitis media 2019    Pneumonia 2019    Pulmonary embolism ([facility] [address] ) 2022    Scoliosis 2019    Seizures ([address] ) 2019    Shingles 2019    Stroke ([facility] [address] ) 2019    Substance abuse ([facility] [address] ) 2019    TIA (transient ischemic attack) 2022    Urinary tract infection 2019    Visual impairment 2019    Von Willebrand disease ([facility] [address] ) 2022       Past Surgical History:   Procedure Laterality Date    BREAST SURGERY      reduction    TONSILLECTOMY         Past Surgical History Pertinent Negatives:   Procedure Date Noted    APPENDECTOMY 2019    BRAIN SURGERY 2019    CARDIAC SURGERY 2019    CARDIAC VALVE REPLACEMENT 2019     SECTION 2019    CHOLECYSTECTOMY 2019    COLON SURGERY 2019    CORONARY ARTERY BYPASS GRAFT 2019    EYE SURGERY 2019    FRACTURE SURGERY 2019    GASTROSTOMY 2019    HERNIA REPAIR 2019    HIP SURGERY 2019    HYSTERECTOMY 2019    JOINT REPLACEMENT 2019    KNEE SURGERY 2019    LYMPH NODE BIOPSY 2019    SMALL INTESTINE SURGERY 2019    SPINE SURGERY 2019    TUBAL LIGATION 2019    VENTRICULOPERITONEAL SHUNT 2019       OB History    Para Term  AB Living   0 0 0 0 0 0   SAB IAB Ectopic Multiple Live Births   0 0 0 0 0       Gynecologic history: No LMP recorded   (Menstrual status: Amenorrheic other)  Social History     Tobacco Use    Smoking status: Never Smoker    Smokeless tobacco: Never Used   Vaping Use    Vaping Use: Never used   Substance Use Topics    Alcohol use: Yes     Comment: soc    Drug use: No       Family History   Problem Relation Age of Onset    No Known Problems Mother     Ovarian cancer Maternal Grandmother        Family history per our office screening tool includes her father with hypertension but is negative for Ashkenazi Buddhist ancestry, birth defects, blood clot in legs or lungs, diabetes, early-onset breast ovarian or colon cancer; Down syndrome or other chromosome problem, genetic condition or carrier state for cystic fibrosis, sickle cell, thalassemia, Prince-Sachs, or spinal muscular atrophy; hemophilia or von Willebrand's disease; preeclampsia, or opiate use disorder/addiction or overdose        Current Outpatient Medications:     cetirizine (ZyrTEC Allergy) 10 mg tablet, Take 10 mg by mouth daily, Disp: , Rfl:     Inositol-D Chiro-Inositol (Ovasitol) 2000-50 MG PACK, Take 1 Scoop by mouth in the morning, Disp: , Rfl:     levothyroxine 125 mcg tablet, TAKE 1 TABLET BY MOUTH EVERY DAY, Disp: 90 tablet, Rfl: 1    metFORMIN (GLUCOPHAGE-XR) 500 mg 24 hr tablet, Take 2 pill in the morning and 2 pills at night , Disp: 120 tablet, Rfl: 2    Ascorbic Acid (vitamin C) 1000 MG tablet, Take 1,000 mg by mouth daily, Disp: , Rfl:     cholecalciferol (VITAMIN D3) 1,000 units tablet, Take 1,000 Units by mouth daily, Disp: , Rfl:     citalopram (CeleXA) 40 mg tablet, TAKE 1 TABLET BY MOUTH DAILY GENERIC EQUIVALENT FOR CELEXA, Disp: 90 tablet, Rfl: 0    co-enzyme Q-10 30 MG capsule, Take 30 mg by mouth 3 (three) times a day, Disp: , Rfl:     methocarbamol (ROBAXIN) 500 mg tablet, Take 500 mg by mouth Three times daily as needed, Disp: , Rfl:     naproxen (NAPROSYN) 500 mg tablet, Take 500 mg by mouth 2 (two) times a day with meals, Disp: , Rfl:     NON FORMULARY, Take 1 capsule by mouth in the morning Eunatural, insotol and vitex, Disp: , Rfl:     olopatadine HCl (PATADAY) 0 2 % opth drops, , Disp: , Rfl:     Allergies   Allergen Reactions    Erythromycin Rash       Occupation: teacher; [employer]  Spouse/Partner Name: Lalita Montoya; Age 39; occupation: IT   He is overweight as well (240#, 5'6")  He has a poor semen analysis  He is working with a reproductive urologist     HPI    Review of Systems   All other systems reviewed and are negative  Vitals: There were no vitals taken for this visit  Physical Exam  Constitutional:       General: She is not in acute distress  Appearance: Normal appearance  She is not ill-appearing, toxic-appearing or diaphoretic  HENT:      Head: Normocephalic and atraumatic  Nose: No congestion or rhinorrhea  Eyes:      General: No scleral icterus  Right eye: No discharge  Left eye: No discharge  Extraocular Movements: Extraocular movements intact  Conjunctiva/sclera: Conjunctivae normal    Pulmonary:      Effort: Pulmonary effort is normal  No respiratory distress  Musculoskeletal:      Cervical back: Normal range of motion  Skin:     Coloration: Skin is not jaundiced or pale  Findings: No erythema, lesion or rash  Neurological:      General: No focal deficit present  Mental Status: She is alert and oriented to person, place, and time  Psychiatric:         Mood and Affect: Mood normal          Behavior: Behavior normal        -------------------------    The total time spent on this new patient on the encounter date was 71 minutes  This time included previsit service time of  10 minutes reviewing records and precharting, face-to-face (via virtual platform) service time of 51 minutes counseling regarding results and coordinating care, and post-service time of 10 minutes charting  At the conclusion of today's encounter, all questions were answered to her satisfaction    We will see her back once pregnant or sooner with any questions or concerns  Thank you very much for this kind referral and please do not hesitate to contact me with any further questions or concerns  Sincerely,    David Flowers MD  Attending Physician, [place]    VIRTUAL VISIT [address] [place] [address] verbally agrees to participate in [employer]  Patient is aware that [employer] could be limited without vital signs or the ability to perform a full hands-on physical exam  Matteojennifer Dill understands she or the provider may request at any time to terminate the video visit and request the patient to seek care or treatment in person

## 2022-05-04 NOTE — ASSESSMENT & PLAN NOTE
Working with Group [facility] Gumaro Duran has BMI cutoff which she is working towards  Seeing Eduardo parr who is willing to treat at this current BMI  Advised healthiest possible weight by healthiest means possible  Open to referral to Diabetes in Pregnancy Program (for indication of prediabetes) to follow fasting glucoses  Encouraged consideration of working to achieve a healthier weight before conceiving despite different BMI cutoff, as she has made some positive changes and lost 10 pounds down to 293 at last visit! Discussed that while BMI is not the sole determinant of health, maternal BMI above 30 in pregnancy confers increased risks of preeclampsia, GDM, cardiac dysfunction, sleep apnea,  delivery, and VTE, and fetal risks include miscarriage, fetal anomalies (along with reduced detection), and stillbirth, macrosomia and impaired growth  Growth assessment is advised in the third trimester  For women with prepregnancy BMI >= 40, we advise weekly antepartum fetal surveillance beginning at 34 weeks  I encouraged regular aerobic and strength-conditioning exercises in and out of pregnancy, given minimal risks and given potential benefits (improvement or maintainance of physical fitness, weight management, reduced risk of GDM in obese women, and psychologic well-being)  An exercise program that leads to an eventual goal of moderate-intensity exercise for at least 20-30 minutes per day on most or all days of the week (150 minutes per week) should be developed and adjusted as medically indicated  Examples of exercises that are safe and beneficial are walking, stationary cycling, dancing, resistance exercises, and water activity  Some modification to exercise routines may be necessary because of normal anatomic and physiologic changes and fetal requirements    The "talk test" can be used to guide self-monitoring during exercise whereby she should reduce intensity or stop the exercise if she cannot talk through it; warning signs to discontinue exercise are bleeding, leaking, abdominal pain, painful regular contractions, dyspnea before exertion, headache, dizziness, chest pain, or calf pain and swelling  Walking is a wonderful form of exercise

## 2022-05-05 ENCOUNTER — TELEPHONE (OUTPATIENT)
Dept: PERINATAL CARE | Facility: CLINIC | Age: 37
End: 2022-05-05

## 2022-05-05 PROBLEM — Z31.69 ENCOUNTER FOR PRECONCEPTION CONSULTATION: Status: ACTIVE | Noted: 2022-05-05

## 2022-05-05 NOTE — PATIENT INSTRUCTIONS
Adal Simental keep up the great work with your dietary changes and exercise  They are paying off! Please take a prenatal vitamin or folic acid daily to prevent neural tube defects

## 2022-05-05 NOTE — ASSESSMENT & PLAN NOTE
Discussed aneuploidy screening, schedule of prenatal care, comanagement alongside MFM  We discussed that  risks associated with assisted reproductive technology (ART) and ovulation induction which include multifetal gestations, prematurity, low birth weight, small for gestational age,  mortality,  delivery, placenta previa, abruptio placentae, preeclampsia, and birth defects  Although these risks are much higher in multifetal gestations, even singletons achieved with ART and ovulation induction may be at higher risk than singletons from naturally occurring pregnancies  Screening fetal echocardiogram is typically advised for women who conceived with the assistance of in vitro fertilization due to the increased risk of fetal structural cardiac abnormalities with IVF; this typically done at 22-24 weeks  This is done in addition to the standard ultrasounds offered by our office which include nuchal translucency ultrasound around 11-13 weeks followed by detailed anatomic survey with cervical length screening around 19-20 weeks  We additionally advise growth assessment at 28 and 34 weeks as well as weekly  surveillance beginning at 36 weeks and continuing until delivery  We discussed that with ART and ovulation induction, higher-order multifetal pregnancy may occur  Multifetal pregnancy and its associated outcomes is the greatest risk of ART and ovulation induction and, consequently, every effort should be made to achieve a andrade gestation  Risks of multifetal gestation increase dramatically with increasing number of fetuses and include: spontaneous  birth and complications of prematurity, low birth weight, cerebral palsy, and infant mortality  Maternal risks including hypertensive disorders, gestational diabetes, and  delivery risk with increasing number of fetuses    Therefore I strongly recommend all efforts be made to minimize the chance of multifetal gestation  These efforts include following professional society guidelines for number of embryos to be transferred during IVF, such as those from the [employer] for Reproductive Medicine (ASRM), and continuing to encourage and expand use of single-embryo transfer  Discussed age related concerns and reviewed slow increase in risk of aneuploidy with advancing year as a means to encourage continued efforts towards wellness:        Would qualify for aspirin prophylaxis once pregnant; risk factors for preeclampsia include BMI>30, AMA, nulliparity

## 2022-05-10 ENCOUNTER — TELEMEDICINE (OUTPATIENT)
Dept: PERINATAL CARE | Facility: CLINIC | Age: 37
End: 2022-05-10
Payer: COMMERCIAL

## 2022-05-10 DIAGNOSIS — E88.81 INSULIN RESISTANCE: ICD-10-CM

## 2022-05-10 DIAGNOSIS — E66.01 CLASS 3 SEVERE OBESITY WITHOUT SERIOUS COMORBIDITY WITH BODY MASS INDEX (BMI) OF 45.0 TO 49.9 IN ADULT, UNSPECIFIED OBESITY TYPE (HCC): ICD-10-CM

## 2022-05-10 DIAGNOSIS — R73.03 PREDIABETES: Primary | ICD-10-CM

## 2022-05-10 DIAGNOSIS — E03.9 HYPOTHYROIDISM, UNSPECIFIED TYPE: ICD-10-CM

## 2022-05-10 DIAGNOSIS — E66.01 CLASS 3 SEVERE OBESITY DUE TO EXCESS CALORIES WITHOUT SERIOUS COMORBIDITY WITH BODY MASS INDEX (BMI) OF 45.0 TO 49.9 IN ADULT (HCC): ICD-10-CM

## 2022-05-10 DIAGNOSIS — Z31.69 ENCOUNTER FOR PRECONCEPTION CONSULTATION: ICD-10-CM

## 2022-05-10 PROCEDURE — G0108 DIAB MANAGE TRN  PER INDIV: HCPCS

## 2022-05-10 RX ORDER — BLOOD-GLUCOSE METER
EACH MISCELLANEOUS
Qty: 1 KIT | Refills: 0 | Status: SHIPPED | OUTPATIENT
Start: 2022-05-10

## 2022-05-10 RX ORDER — LANCETS 33 GAUGE
EACH MISCELLANEOUS
Qty: 100 EACH | Refills: 2 | Status: SHIPPED | OUTPATIENT
Start: 2022-05-10 | End: 2022-05-26 | Stop reason: SDUPTHER

## 2022-05-10 RX ORDER — BLOOD SUGAR DIAGNOSTIC
STRIP MISCELLANEOUS
Qty: 100 STRIP | Refills: 2 | Status: SHIPPED | OUTPATIENT
Start: 2022-05-10 | End: 2022-05-26 | Stop reason: SDUPTHER

## 2022-05-10 NOTE — PROGRESS NOTES
Virtual Regular Visit    Verification of patient location:    Patient is located in the following state in which I hold an active license [place]      Assessment/Plan:    Problem List Items Addressed This Visit        Endocrine    Hypothyroidism    Insulin resistance       Other    Class 3 severe obesity due to excess calories without serious comorbidity with body mass index (BMI) of 45 0 to 49 9 in adult ([facility] [address] )    BMI 45 0-49 9, adult ([facility] [address] )    Encounter for preconception consultation      Other Visit Diagnoses     Prediabetes    -  Primary               Reason for visit is   Chief Complaint   Patient presents with    Blood Sugar Problem    Patient Education    Virtual Regular Visit        Encounter provider Taj Landers    Provider located at [address] [place] [id number] [phone]      Recent Visits  Date Type Provider Dept   05/05/22 Telephone Sharon Arora, [address] [place] [address] Drive   05/03/22 Telephone [address]   Showing recent visits within past 7 days and meeting all other requirements  Today's Visits  Date Type Provider Dept   05/10/22 [address] today's visits and meeting all other requirements  Future Appointments  No visits were found meeting these conditions  Showing future appointments within next 150 days and meeting all other requirements       The patient was identified by name and date of birth  Anton José Miguel was informed that this is a telemedicine visit and that the visit is being conducted through [facility] and patient was informed this is a secure, HIPAA-complaint platform  She agrees to proceed     My office door was closed  No one else was in the room  She acknowledged consent and understanding of privacy and security of the video platform   The patient has agreed to participate and understands they can discontinue the visit at any time     Patient is aware this is a billable service  Capri Delvalle is a 40 y o  female  Reviewed 5/4/22 preconception counseling note by Dr Shira Syed: Open to referral to Diabetes in Pregnancy Program (for indication of prediabetes) to follow fasting glucoses  Encouraged consideration of working to achieve a healthier weight before conceiving despite different BMI cutoff, as she has made some positive changes and lost 10 pounds down to 293 at last visit! HPI     Past Medical History:   Diagnosis Date    BMI 45 0-49 9, adult ([facility] [address] )     Herniated disc, cervical        Past Surgical History:   Procedure Laterality Date    BREAST SURGERY      reduction    TONSILLECTOMY         Current Outpatient Medications   Medication Sig Dispense Refill    Ascorbic Acid (vitamin C) 1000 MG tablet Take 1,000 mg by mouth daily      cetirizine (ZyrTEC Allergy) 10 mg tablet Take 10 mg by mouth daily      cholecalciferol (VITAMIN D3) 1,000 units tablet Take 1,000 Units by mouth daily      citalopram (CeleXA) 40 mg tablet TAKE 1 TABLET BY MOUTH DAILY GENERIC EQUIVALENT FOR CELEXA 90 tablet 0    co-enzyme Q-10 30 MG capsule Take 30 mg by mouth 3 (three) times a day      Inositol-D Chiro-Inositol (Ovasitol) 2000-50 MG PACK Take 1 Scoop by mouth in the morning      levothyroxine 125 mcg tablet TAKE 1 TABLET BY MOUTH EVERY DAY 90 tablet 1    metFORMIN (GLUCOPHAGE-XR) 500 mg 24 hr tablet Take 2 pill in the morning and 2 pills at night   (Patient taking differently: 1,000 mg 2 (two) times a day with meals Take 2 pill in the morning and 2 pills at night  ) 120 tablet 2    NON FORMULARY Take 1 capsule by mouth in the morning Eunatural, insotol and vitex      olopatadine HCl (PATADAY) 0 2 % opth drops       methocarbamol (ROBAXIN) 500 mg tablet Take 500 mg by mouth Three times daily as needed (Patient not taking: Reported on 5/10/2022 )      naproxen (NAPROSYN) 500 mg tablet Take 500 mg by mouth 2 (two) times a day with meals (Patient not taking: Reported on 5/10/2022 )       No current facility-administered medications for this visit  Allergies   Allergen Reactions    Erythromycin Rash       Review of Systems    Video Exam    There were no vitals filed for this visit  Physical Exam     I spent 30 minutes with patient today in which greater than 50% of the time was spent in counseling/coordination of care regarding glucose meter education and review of food diary and discussion of weight loss diet guidelines    VIRTUAL VISIT [facility] verbally agrees to participate in [employer]  Pt is aware that [employer] could be limited without vital signs or the ability to perform a full hands-on physical Rafaeldemond Nogueira understands she or the provider may request at any time to terminate the video visit and request the patient to seek care or treatment in person  Thank you for referring your patient to [facility] Maternal Fetal Medicine Diabetes in Pregnancy Program      Javier Lea is a  40 y o  female who presents today for blood glucose monitoring during pregnancy  Patient is at Unknown gestation, Estimated Date of Delivery: None noted        Reviewed and updated the following from patients medical record: PMH, Problem List, Allergies, and Current Medications  Visit Diagnosis:  Pre-diabetes  Reason for meter education: referral from Dr Sherice Ruiz to follow FBG due to A1c   [facility]ed [facility] weight [facility]  Patient is also a [facility] patient and wants to become pregnant         Additional Complications:  Obesity    Labs:  10/1/21 A1c 6 0%  2/18/22 A1c 5 7%  2/18/22 Fasting insulin 25 6      Medications:  Metformin 2000 mg per day per notes to help with weight loss and lower A1c/insulin     Anthropometrics:  Ht Readings from Last 3 Encounters:   04/28/22 5' 5" (1 651 m)   03/22/22 5' 5" (1 651 m)   01/06/22 5' 5" (1 651 m)     Wt Readings from Last 3 Encounters:   22 133 kg (293 lb 14 4 oz)   22 (!) 137 kg (303 lb)   20 133 kg (294 lb)     Patient reported a recent 10 pound weight loss since following Weight Watchers Guidelines  She also has an [place] on her phone tracking caloric intake averaging 1700 catrina/d  Patient reported eating more when stressed and also if working outside of the home because she will  food from [employer]  [facility] weight management provided patient with meal plan for 5542-9464 calories and 3317-4708 calories  Blood Glucose Monitoring:   Glucose Meter: OneTouch Verio Flex  Instructed on testing blood sugars: advised patient to check fasting blood sugars daily as per Dr Emerita Beavers  suggested patient share blood sugars with Dr Eemrita Beavers through an in-basket message and with Weight Management and [employer]  Gave instruction on site selection, skin preparation, loading strips and lancet device, meter activation, obtaining blood sample, test strip and lancet disposal and storage, and recording log book entries  Patient has good understanding of material covered and was able to test their own blood sugar in office today  Instruction for reporting blood sugar results weekly via:  Phone: [phone]   OR  My Chart (Message with image attachment, or Glucose Flowsheet)    Goal Blood Sugar Ranges:   Fastin-90 mg/dL  1 hour after the start of each meal: 140 mg/dL or less  2 hours after start of each meal: 120 mg/dL or less      Begin Time: 10:45 am  End Time: 11:15 am    It was a pleasure working with them today  Please feel free to call with any questions or concerns      Bill Perez RD,LDN,CDE   Diabetes Educator  [facility]  Diabetes in Pregnancy Program  [facility], [address] [place] [address],[address]  [address]

## 2022-05-23 ENCOUNTER — PATIENT MESSAGE (OUTPATIENT)
Dept: PERINATAL CARE | Facility: CLINIC | Age: 37
End: 2022-05-23

## 2022-05-23 DIAGNOSIS — R73.03 PREDIABETES: ICD-10-CM

## 2022-05-23 DIAGNOSIS — E66.01 CLASS 3 SEVERE OBESITY WITHOUT SERIOUS COMORBIDITY WITH BODY MASS INDEX (BMI) OF 45.0 TO 49.9 IN ADULT, UNSPECIFIED OBESITY TYPE (HCC): ICD-10-CM

## 2022-05-23 DIAGNOSIS — Z31.69 ENCOUNTER FOR PRECONCEPTION CONSULTATION: ICD-10-CM

## 2022-05-24 ENCOUNTER — DOCUMENTATION (OUTPATIENT)
Dept: PERINATAL CARE | Facility: CLINIC | Age: 37
End: 2022-05-24

## 2022-05-24 NOTE — PROGRESS NOTES
Date: 05/24/22  [facility]  1985  Patient was referred by Dr Brittni Alvarado following 5/4/22 preconception counseling appointment  Reviewed 5/4/22 preconception counseling note by Dr Brittni Alvarado: Open to referral to Diabetes in Pregnancy Program (for indication of prediabetes) to follow fasting glucoses   Encouraged consideration of working to achieve a healthier weight before conceiving despite different BMI cutoff, as she has made some positive changes and lost 10 pounds down to 293 at last visit! Plan:  Per Dr [place] message to patient: I think you will see most importantly a drop in your A1C out of prediabetic range if you continue to follow this plan  Focusing on that rather than weight loss will help reduce frustration with positive changes  I support the idea of a healthy bedtime snack      If you take a look at that prediabetic book they show you how to do a carbohydrate count and plan  This is really valuable since we do not advocate for ketogenic (no-carb diets)     How about we do a check in in 2 weeks?       You can also think about adding some post-meal blood sugar monitoring  That will help you make adjustments in your meals       Lastly about the ice cream there are good (higher fat) options that taste really good and won't spike your blood sugar as much as you would expect      How does that sound? Sincerely,     Leodan Leal MD  Maternal-Fetal Medicine    Diet: : 1800 calorie (CHO: [id number]) (PRO:2-1-3-1-3-2)  Gestational diabetes meal plan; 3 meals and 3 snacks  Testing blood sugars: Follow per Dr Marylee Prudent instructions as above message  Also suggested patient share results with Weight Management and [facility]        Meter: OneTouch Verio Flex  Activity: Walks 30 minutes daily, follow OB recommendations     Support System: Significant Other / family     Education:  5/10/22 Glucose meter education and nutrition information provided to encourage healthy eating habits and balance CHO with protein  Patient's goal is to continue slow progressive weight loss and good blood glucose control with a future pregnancy    Weight status : BMI 48 91  Patient reported a recent 10 pound weight loss since following Weight Watchers Guidelines       Labs  No results found for: [id number]  Lab Results   Component Value Date    GLUF 87 02/18/2022     Lab Results   Component Value Date    HGBA1C 5 7 (H) 02/18/2022     10/1/21 A1c 6 0%  2/18/21 Fasting insulin 25 6     Patient to continue reporting blood glucose results to Dr Paul Medeiros as advised above      Joe Howe, RD,LDN,CDE  Diabetes Educator

## 2022-05-26 RX ORDER — BLOOD SUGAR DIAGNOSTIC
STRIP MISCELLANEOUS
Qty: 100 STRIP | Refills: 5 | Status: SHIPPED | OUTPATIENT
Start: 2022-05-26

## 2022-05-26 RX ORDER — LANCETS 33 GAUGE
EACH MISCELLANEOUS
Qty: 100 EACH | Refills: 5 | Status: SHIPPED | OUTPATIENT
Start: 2022-05-26

## 2022-06-01 ENCOUNTER — TELEPHONE (OUTPATIENT)
Dept: BARIATRICS | Facility: CLINIC | Age: 37
End: 2022-06-01

## 2022-06-01 NOTE — TELEPHONE ENCOUNTER
LM asking patient if she would be able to come in over lunch or later in the afternoon as the provider needs to be out of the office for a few hours  Office information left for patient to call at convenience

## 2022-06-08 ENCOUNTER — TELEPHONE (OUTPATIENT)
Dept: BARIATRICS | Facility: CLINIC | Age: 37
End: 2022-06-08

## 2022-06-08 NOTE — TELEPHONE ENCOUNTER
Called patient and left a message to give the office a call back to reschedule her 6/9/22 appointment with Debbie Baez PA-C that she had cancelled through My Chart per her request

## 2022-06-23 DIAGNOSIS — F41.9 ANXIETY: ICD-10-CM

## 2022-06-24 ENCOUNTER — TELEPHONE (OUTPATIENT)
Dept: FAMILY MEDICINE CLINIC | Facility: CLINIC | Age: 37
End: 2022-06-24

## 2022-06-24 NOTE — TELEPHONE ENCOUNTER
Please call and see if she is on this med   I see ob info in chart and just need to verify still taking this

## 2022-06-27 ENCOUNTER — TELEPHONE (OUTPATIENT)
Dept: FAMILY MEDICINE CLINIC | Facility: CLINIC | Age: 37
End: 2022-06-27

## 2022-06-27 DIAGNOSIS — E03.9 HYPOTHYROIDISM, UNSPECIFIED TYPE: ICD-10-CM

## 2022-06-27 DIAGNOSIS — F41.9 ANXIETY: ICD-10-CM

## 2022-06-28 RX ORDER — LEVOTHYROXINE SODIUM 0.12 MG/1
125 TABLET ORAL DAILY
Qty: 90 TABLET | Refills: 1 | Status: SHIPPED | OUTPATIENT
Start: 2022-06-28

## 2022-06-28 RX ORDER — CITALOPRAM 40 MG/1
40 TABLET ORAL DAILY
Qty: 90 TABLET | Refills: 0 | Status: SHIPPED | OUTPATIENT
Start: 2022-06-28 | End: 2022-07-02 | Stop reason: SDUPTHER

## 2022-07-20 ENCOUNTER — OFFICE VISIT (OUTPATIENT)
Dept: FAMILY MEDICINE CLINIC | Facility: CLINIC | Age: 37
End: 2022-07-20
Payer: COMMERCIAL

## 2022-07-20 VITALS
TEMPERATURE: 97.8 F | DIASTOLIC BLOOD PRESSURE: 88 MMHG | HEIGHT: 66 IN | RESPIRATION RATE: 16 BRPM | SYSTOLIC BLOOD PRESSURE: 122 MMHG | OXYGEN SATURATION: 97 % | BODY MASS INDEX: 45.58 KG/M2 | WEIGHT: 283.6 LBS | HEART RATE: 95 BPM

## 2022-07-20 DIAGNOSIS — Z00.00 ANNUAL PHYSICAL EXAM: Primary | ICD-10-CM

## 2022-07-20 DIAGNOSIS — F41.9 ANXIETY: ICD-10-CM

## 2022-07-20 DIAGNOSIS — E03.9 HYPOTHYROIDISM, UNSPECIFIED TYPE: ICD-10-CM

## 2022-07-20 PROCEDURE — 3725F SCREEN DEPRESSION PERFORMED: CPT | Performed by: NURSE PRACTITIONER

## 2022-07-20 PROCEDURE — 99395 PREV VISIT EST AGE 18-39: CPT | Performed by: NURSE PRACTITIONER

## 2022-07-20 NOTE — PROGRESS NOTES
[address]    NAME: Isabel Sanchez  AGE: 40 y o  SEX: female  : 1985     DATE: 2022     Assessment and Plan:     Problem List Items Addressed This Visit        Endocrine    Hypothyroidism    Relevant Orders    TSH, 3rd generation with Free T4 reflex       Other    Anxiety    BMI 45 0-49 9, adult ([facility] [address] )      Other Visit Diagnoses     Annual physical exam    -  Primary          Immunizations and preventive care screenings were discussed with patient today  Appropriate education was printed on patient's after visit summary  Counseling:  Alcohol/drug use: discussed moderation in alcohol intake, the recommendations for healthy alcohol use, and avoidance of illicit drug use  Dental Health: discussed importance of regular tooth brushing, flossing, and dental visits  Injury prevention: discussed safety/seat belts, safety helmets, smoke detectors, carbon dioxide detectors, and smoking near bedding or upholstery  Sexual health: discussed sexually transmitted diseases, partner selection, use of condoms, avoidance of unintended pregnancy, and contraceptive alternatives  · Exercise: the importance of regular exercise/physical activity was discussed  Recommend exercise 3-5 times per week for at least 30 minutes  Depression Screening and Follow-up Plan: Patient was screened for depression during today's encounter  They screened negative with a PHQ-2 score of 2  Return in about 1 year (around 2023) for Annual physical      Chief Complaint:     Chief Complaint   Patient presents with    Annual Exam      History of Present Illness:     Adult Annual Physical   Patient here for a comprehensive physical exam  The patient reports problems - trying to get pregnant  Was seeing fertility specialist    Tried for 2 cycles and not pregnant  Taking a break for now        Diet and Physical Activity  · Diet/Nutrition: well balanced diet, consuming 3-5 servings of fruits/vegetables daily and workign with someone to count macros  · Exercise: no formal exercise  Depression Screening  PHQ-2/9 Depression Screening    Little interest or pleasure in doing things: 1 - several days  Feeling down, depressed, or hopeless: 1 - several days  PHQ-2 Score: 2  PHQ-2 Interpretation: Negative depression screen       General Health  · Sleep: sleeps well  · Hearing: normal - bilateral   · Vision: no vision problems  · Dental: regular dental visits  /GYN Health  · Last menstrual period: issues    Seeing specialist   · Once stopped OCP   · History of STDs?: no      Review of Systems:     Review of Systems   Constitutional: Negative for activity change, appetite change, chills, fatigue and fever  HENT: Negative for congestion, ear pain, rhinorrhea and sore throat  Eyes: Negative for pain and visual disturbance  Respiratory: Negative for cough, chest tightness and shortness of breath  Cardiovascular: Negative for chest pain and palpitations  Gastrointestinal: Positive for diarrhea  Negative for abdominal pain, constipation and vomiting  Gets occasional diarrhea from metformin       Genitourinary: Positive for menstrual problem  Negative for dysuria, hematuria and urgency  Musculoskeletal: Negative for arthralgias and back pain  Skin: Negative for color change and rash  Neurological: Negative for dizziness, seizures, syncope, light-headedness and headaches  Psychiatric/Behavioral: The patient is not nervous/anxious  All other systems reviewed and are negative       Past Medical History:     Past Medical History:   Diagnosis Date    BMI 45 0-49 9, adult ([facility] [address] )     Herniated disc, cervical       Past Surgical History:     Past Surgical History:   Procedure Laterality Date    BREAST SURGERY      reduction    TONSILLECTOMY        Social History:     Social History Socioeconomic History    Marital status: /Civil Union     Spouse name: None    Number of children: None    Years of education: None    Highest education level: None   Occupational History    None   Tobacco Use    Smoking status: Never Smoker    Smokeless tobacco: Never Used   Vaping Use    Vaping Use: Never used   Substance and Sexual Activity    Alcohol use: Yes     Comment: soc    Drug use: No    Sexual activity: Yes     Partners: Male     Birth control/protection: None     Comment:    Other Topics Concern    None   Social History Narrative    None     Social Determinants of Health     Financial Resource Strain: Not on file   Food Insecurity: Not on file   Transportation Needs: Not on file   Physical Activity: Not on file   Stress: Not on file   Social Connections: Not on file   Intimate Partner Violence: Not on file   Housing Stability: Not on file      Family History:     Family History   Problem Relation Age of Onset    No Known Problems Mother     Ovarian cancer Maternal Grandmother       Current Medications:     Current Outpatient Medications   Medication Sig Dispense Refill    Ascorbic Acid (vitamin C) 1000 MG tablet Take 1,000 mg by mouth daily      Blood Glucose Monitoring Suppl (OneTouch Verio Flex System) w/Device KIT Dispense 1 kit per insurance formulary  Prediabetes   1 kit 0    cetirizine (ZyrTEC) 10 mg tablet Take 10 mg by mouth daily      cholecalciferol (VITAMIN D3) 1,000 units tablet Take 1,000 Units by mouth daily      citalopram (CeleXA) 40 mg tablet Take 1 tablet (40 mg total) by mouth in the morning 90 tablet 0    co-enzyme Q-10 30 MG capsule Take 30 mg by mouth 3 (three) times a day      Inositol-D Chiro-Inositol (Ovasitol) 2000-50 MG PACK Take 1 Scoop by mouth in the morning      levothyroxine 125 mcg tablet Take 1 tablet (125 mcg total) by mouth daily 90 tablet 1    metFORMIN (GLUCOPHAGE-XR) 500 mg 24 hr tablet Take 2 pill in the morning and 2 pills at night  (Patient taking differently: 1,000 mg 2 (two) times a day with meals Take 2 pill in the morning and 2 pills at night ) 120 tablet 2    olopatadine HCl (PATADAY) 0 2 % opth drops       OneTouch Delica Lancets 87V MISC Use 1 a day to test FBG daily as advised by Dr Tena Motley  Prediabetes  100 each 5    OneTouch Verio test strip Test Fasting blood sugar daily and test one postprandial blood sugar as advised by Dr Bisi Manning  Prediabetes  100 strip 5    NON FORMULARY Take 1 capsule by mouth in the morning Eunatural, insotol and vitex (Patient not taking: Reported on 7/20/2022)       No current facility-administered medications for this visit  Allergies: Allergies   Allergen Reactions    Erythromycin Rash      Physical Exam:     /88 (BP Location: Left arm, Patient Position: Sitting, Cuff Size: Large)   Pulse 95   Temp 97 8 °F (36 6 °C) (Temporal)   Resp 16   Ht 5' 5 5" (1 664 m)   Wt 129 kg (283 lb 9 6 oz)   SpO2 97%   BMI 46 48 kg/m²     Physical Exam  Vitals and nursing note reviewed  Constitutional:       General: She is not in acute distress  Appearance: Normal appearance  She is well-developed  She is obese  HENT:      Head: Normocephalic and atraumatic  Right Ear: Tympanic membrane, ear canal and external ear normal       Left Ear: Tympanic membrane, ear canal and external ear normal       Nose: Nose normal    Eyes:      Conjunctiva/sclera: Conjunctivae normal       Pupils: Pupils are equal, round, and reactive to light  Cardiovascular:      Rate and Rhythm: Normal rate and regular rhythm  Pulses: Normal pulses  Heart sounds: Normal heart sounds  No murmur heard  Pulmonary:      Effort: Pulmonary effort is normal  No respiratory distress  Breath sounds: Normal breath sounds  Abdominal:      General: Bowel sounds are normal       Palpations: Abdomen is soft  Tenderness: There is no abdominal tenderness     Musculoskeletal:         General: Normal range of motion  Cervical back: Normal range of motion and neck supple  Skin:     General: Skin is warm and dry  Neurological:      General: No focal deficit present  Mental Status: She is alert and oriented to person, place, and time  Psychiatric:         Mood and Affect: Mood normal          Behavior: Behavior normal          Thought Content:  Thought content normal          Judgment: Judgment normal           FLORENTINO Hernandez   [address]

## 2022-07-20 NOTE — PATIENT INSTRUCTIONS

## 2022-08-30 ENCOUNTER — LAB (OUTPATIENT)
Dept: LAB | Age: 37
End: 2022-08-30
Payer: COMMERCIAL

## 2022-08-30 DIAGNOSIS — E88.81 INSULIN RESISTANCE: ICD-10-CM

## 2022-08-30 DIAGNOSIS — E03.9 HYPOTHYROIDISM, UNSPECIFIED TYPE: ICD-10-CM

## 2022-08-30 DIAGNOSIS — Z92.89 HISTORY OF POSITIVE PPD: Primary | ICD-10-CM

## 2022-08-30 DIAGNOSIS — Z92.89 HISTORY OF POSITIVE PPD: ICD-10-CM

## 2022-08-30 LAB — TSH SERPL DL<=0.05 MIU/L-ACNC: 0.94 UIU/ML (ref 0.45–4.5)

## 2022-08-30 PROCEDURE — 86480 TB TEST CELL IMMUN MEASURE: CPT

## 2022-08-30 PROCEDURE — 84443 ASSAY THYROID STIM HORMONE: CPT

## 2022-08-30 PROCEDURE — 36415 COLL VENOUS BLD VENIPUNCTURE: CPT

## 2022-08-30 RX ORDER — METFORMIN HYDROCHLORIDE 500 MG/1
TABLET, EXTENDED RELEASE ORAL
Qty: 120 TABLET | Refills: 1 | Status: SHIPPED | OUTPATIENT
Start: 2022-08-30 | End: 2022-09-23

## 2022-09-01 LAB
GAMMA INTERFERON BACKGROUND BLD IA-ACNC: 0.03 IU/ML
M TB IFN-G BLD-IMP: NEGATIVE
M TB IFN-G CD4+ BCKGRND COR BLD-ACNC: 0 IU/ML
M TB IFN-G CD4+ BCKGRND COR BLD-ACNC: 0 IU/ML
MITOGEN IGNF BCKGRD COR BLD-ACNC: >10 IU/ML

## 2022-09-02 ENCOUNTER — TELEPHONE (OUTPATIENT)
Dept: FAMILY MEDICINE CLINIC | Facility: CLINIC | Age: 37
End: 2022-09-02

## 2022-09-22 DIAGNOSIS — E88.81 INSULIN RESISTANCE: ICD-10-CM

## 2022-09-23 RX ORDER — METFORMIN HYDROCHLORIDE 500 MG/1
TABLET, EXTENDED RELEASE ORAL
Qty: 360 TABLET | Refills: 1 | Status: SHIPPED | OUTPATIENT
Start: 2022-09-23

## 2022-10-05 NOTE — PROGRESS NOTES
[facility] [address] [place] [address]    ASSESSMENT/PLAN: Norbert Walker is a 40 y o  Dustin Grise who presents for annual gynecologic exam     Encounter for routine gynecologic examination  - Routine well woman exam completed today  - Cervical Cancer Screening: Current ASCCP Guidelines reviewed  Last Pap: 09/30/2021 normal  Next Pap Due: 1-2 yrs  - HPV Vaccination status: Immunization series complete  - STI screening offered including HIV testing: not indicated based on hx or requested at time of visit  - Contraceptive counseling discussed  Current contraception: none:   - RTO 1 yr    Additional problems addressed during this visit:  1  Routine gynecological examination    2  Insulin resistance    3  Hypothyroidism, unspecified type    4  BMI 45 0-49 9, adult [facility])        CC: Annual Gynecologic Examination    HPI: Norbert Walker is a 40 y o  Dustin Kapadia who presents for annual gynecologic examination  Kennethshanita presents for gyn exam today  9/11/22 LMP  Menses is irregular, flow moderate  Not using birth control method  Last pap smear 2021 negative Pap smear and HPV  History of abnormal pap smear- no  Sexually active- yes, with partner for 9 yrs  HPV vaccine - done  Interrupted sleep per day  2-3 servings of calcium rich food per day  Daily walks  Minimal servings of caffeine per day  Does not perform SBE monthly  Safe at home yes  Wears seat belts  Concerns : Planning on Covid booster  Had work-up with Reproductive Endocrinology and timed cycle with IUI  At this time needs to take a break due to the emotional stressors  Huyenon is very positive and remains focused on lifestyle changes           The following portions of the patient's history were reviewed and updated as appropriate: allergies, current medications, past family history, past medical history, obstetric history, gynecologic history, past social history, past surgical history and problem list     Review of Systems Constitutional: Negative for activity change, appetite change, fatigue and fever  Respiratory: Negative for cough and shortness of breath  Cardiovascular: Negative for chest pain, palpitations and leg swelling  Gastrointestinal: Negative for constipation and diarrhea  Genitourinary: Positive for menstrual problem  Negative for difficulty urinating, dysuria, frequency, pelvic pain, urgency, vaginal bleeding, vaginal discharge and vaginal pain  Neurological: Negative for light-headedness and headaches  Psychiatric/Behavioral: The patient is nervous/anxious  Objective:  /70   Ht 5' 5 3" (1 659 m)   Wt 127 kg (280 lb 4 8 oz)   LMP 09/11/2022   BMI 46 22 kg/m²    Physical Exam  Vitals reviewed  Constitutional:       Appearance: Normal appearance  HENT:      Head: Normocephalic  Neck:      Thyroid: No thyroid mass or thyroid tenderness  Cardiovascular:      Rate and Rhythm: Normal rate and regular rhythm  Heart sounds: Normal heart sounds  Pulmonary:      Effort: Pulmonary effort is normal       Breath sounds: Normal breath sounds  Chest:   Breasts:      Right: No mass, nipple discharge, skin change, tenderness or axillary adenopathy  Left: No mass, nipple discharge, skin change, tenderness or axillary adenopathy  Abdominal:      General: There is no distension  Palpations: There is no mass  Tenderness: There is no abdominal tenderness  There is no guarding  Genitourinary:     General: Normal vulva  Exam position: Lithotomy position  Labia:         Right: No tenderness or lesion  Left: No tenderness or lesion  Vagina: No vaginal discharge, tenderness, bleeding or lesions  Cervix: No discharge, lesion, erythema or cervical bleeding  Uterus: Normal  Not enlarged and not tender  Adnexa:         Right: No mass, tenderness or fullness  Left: No mass, tenderness or fullness       Musculoskeletal: Cervical back: Normal range of motion  Lymphadenopathy:      Upper Body:      Right upper body: No axillary adenopathy  Left upper body: No axillary adenopathy  Skin:     General: Skin is warm and dry  Neurological:      Mental Status: She is alert     Psychiatric:         Mood and Affect: Mood normal          Behavior: Behavior normal          Judgment: Judgment normal

## 2022-10-06 ENCOUNTER — ANNUAL EXAM (OUTPATIENT)
Dept: OBGYN CLINIC | Facility: MEDICAL CENTER | Age: 37
End: 2022-10-06
Payer: COMMERCIAL

## 2022-10-06 VITALS
SYSTOLIC BLOOD PRESSURE: 118 MMHG | DIASTOLIC BLOOD PRESSURE: 70 MMHG | WEIGHT: 280.3 LBS | BODY MASS INDEX: 46.7 KG/M2 | HEIGHT: 65 IN

## 2022-10-06 DIAGNOSIS — E03.9 HYPOTHYROIDISM, UNSPECIFIED TYPE: ICD-10-CM

## 2022-10-06 DIAGNOSIS — E88.81 INSULIN RESISTANCE: ICD-10-CM

## 2022-10-06 DIAGNOSIS — Z01.419 ROUTINE GYNECOLOGICAL EXAMINATION: Primary | ICD-10-CM

## 2022-10-06 PROCEDURE — S0612 ANNUAL GYNECOLOGICAL EXAMINA: HCPCS | Performed by: ADVANCED PRACTICE MIDWIFE

## 2023-01-18 ENCOUNTER — AMB VIDEO VISIT (OUTPATIENT)
Dept: OTHER | Facility: HOSPITAL | Age: 38
End: 2023-01-18

## 2023-01-18 NOTE — CARE ANYWHERE EVISITS
Visit Summary for [facility]   Juan Carlos - Gender: Female - Date of Birth: [id number]  Date: [id number] - Duration: 4 minutes  Patient: [facility]  Provider: Lesa Uribe    Patient Contact Information  Address  [address] [address]  [phone]    Visit Topics  chills, fever, sore throat, body aches, sinus pressure, headache, can't swallow, stuffy nose  [Added By: Self - 2023-01-18]    Sick Slip  Reason [ILLNESS]  Remarks [Patient can be excused from work from January to January 20, 2023]  Triage Questions   What is your current physical address in the event of a medical emergency? Answer []  Are you allergic to any medications? Answer []  Are you now or could you be pregnant? Answer []  Do you have any immune system compromise or chronic lung   disease? Answer []  Do you have any vulnerable family members in the home (infant, pregnant, cancer, elderly)? Answer []     Conversation Transcripts  [0A][0A] [Notification] You are connected with [facility] [0A][Notification] Genaro Peguero is located in [place]  [0A][Notification] Genaro Peguero has shared health history  [facility]  [0A]    Diagnosis  Influenza due to oth ident influenza virus w oth manifest    Procedures  Value: [id number] Code: CPT-4 UNLISTED E&M SERVICE    Medications Prescribed    benzonatate  Dose : 1 capsule  Route : oral  Frequency : 3 times a day  Until directed to stop  Refills : 0  Instructions to the Pharmacist : Substitutions allowed      Provider Notes  [0A][0A] Patient is 40year old female with no significant past medical history  Patient stated that she's not taking any medication regularly  Patient also reported that she's not allergic to any medication  Patient stated that for the last five days   she started to have runny nose, cough, congestion, generalized body aches, nausea vomiting  On physical exam patient does not look in any acute distress   Patient symptoms consistent with influenza with both respiratory and G I  manifestation  I will   start patient on symptomatic treatment with Tessalon perri and advised to follow up with her primary care physician in the next three days  [0A]    Electronically signed by: Alexandria Yan(NPI [id number])

## 2023-01-19 ENCOUNTER — TELEMEDICINE (OUTPATIENT)
Dept: FAMILY MEDICINE CLINIC | Facility: CLINIC | Age: 38
End: 2023-01-19

## 2023-01-19 DIAGNOSIS — J11.1 INFLUENZA-LIKE ILLNESS: Primary | ICD-10-CM

## 2023-01-19 NOTE — LETTER
January 19, 2023     Patient: Anthony Garcia  YOB: 1985  Date of Visit: 1/19/2023      To Whom it May Concern:    Emerald Garcia is under my professional care  Omaridede Goldman was seen in my office on 1/19/2023  Omari Goldman may return to work on 1/23/2023  If you have any questions or concerns, please don't hesitate to call           Sincerely,          FLORENTINO Lam        CC:   No Recipients

## 2023-01-19 NOTE — PROGRESS NOTES
Virtual Regular Visit    Verification of patient location:    Patient is located in the following state in which I hold an active license [place]      Assessment/Plan:    Problem List Items Addressed This Visit    None  Visit Diagnoses     Influenza-like illness    -  Primary    Improving               Reason for visit is   Chief Complaint   Patient presents with   • Nasal Congestion   • Sore Throat   • Fever   • Chills   • Generalized Body Aches        Encounter provider FLORENTINO Coronado    Provider located at [facility] [address] [id number]      Recent Visits  No visits were found meeting these conditions  Showing recent visits within past 7 days and meeting all other requirements  Today's Visits  Date Type Provider Dept   01/19/23 Telemedicine Jaleesa Rosales, [address]   Showing today's visits and meeting all other requirements  Future Appointments  No visits were found meeting these conditions  Showing future appointments within next 150 days and meeting all other requirements       The patient was identified by name and date of birth  Montanaezequiel Jameser was informed that this is a telemedicine visit and that the visit is being conducted through Telephone  My office door was closed  No one else was in the room  She acknowledged consent and understanding of privacy and security of the video platform  The patient has agreed to participate and understands they can discontinue the visit at any time  Patient is aware this is a billable service       Flor Jorgensen is a 40 y o  female with influenza like symptoms and negative Covid test        HPI     Past Medical History:   Diagnosis Date   • BMI 45 0-49 9, adult ([address] )    • Herniated disc, cervical        Past Surgical History:   Procedure Laterality Date   • BREAST SURGERY      reduction   • TONSILLECTOMY         Current Outpatient Medications Medication Sig Dispense Refill   • Blood Glucose Monitoring Suppl (OneTouch Verio Flex System) w/Device KIT Dispense 1 kit per insurance formulary  Prediabetes  1 kit 0   • cetirizine (ZyrTEC) 10 mg tablet Take 10 mg by mouth daily     • citalopram (CeleXA) 40 mg tablet TAKE 1 TABLET (40 MG TOTAL) BY MOUTH IN THE MORNING 90 tablet 1   • levothyroxine 125 mcg tablet TAKE 1 TABLET BY MOUTH EVERY DAY 90 tablet 2   • metFORMIN (GLUCOPHAGE-XR) 500 mg 24 hr tablet TAKE 2 PILL IN THE MORNING AND 2 PILLS AT NIGHT  360 tablet 1   • OneTouch Delica Lancets 76B MISC Use 1 a day to test FBG daily as advised by Dr Chetna Gross  Prediabetes  100 each 5   • OneTouch Verio test strip Test Fasting blood sugar daily and test one postprandial blood sugar as advised by Dr Kirti Vergara  Prediabetes  100 strip 5     No current facility-administered medications for this visit  Allergies   Allergen Reactions   • Erythromycin Rash       Review of Systems   Constitutional: Positive for chills, fatigue and fever  HENT: Positive for congestion, sinus pressure and sore throat  Negative for rhinorrhea  Respiratory: Positive for cough  Negative for shortness of breath and wheezing  Cardiovascular: Negative for palpitations  Gastrointestinal: Positive for nausea and vomiting  Negative for abdominal pain and diarrhea  Musculoskeletal: Positive for myalgias  Neurological: Positive for headaches  Video Exam    There were no vitals filed for this visit      Physical Exam     I spent 15 minutes with patient today in which greater than 50% of the time was spent in counseling/coordination of care regarding influenza like symptoms

## 2023-01-23 ENCOUNTER — PATIENT MESSAGE (OUTPATIENT)
Dept: OBGYN CLINIC | Facility: MEDICAL CENTER | Age: 38
End: 2023-01-23

## 2023-02-15 ENCOUNTER — TELEPHONE (OUTPATIENT)
Dept: OBGYN CLINIC | Facility: MEDICAL CENTER | Age: 38
End: 2023-02-15

## 2023-02-15 NOTE — TELEPHONE ENCOUNTER
I have not seen them , I check everyday  Manette Call could you please contact medical records and let patient know status   Thank you

## 2023-02-15 NOTE — PATIENT COMMUNICATION
Records were faxed on 1/11/2023  I called this morning and spoke with 3 different people  [facility] said they never received  The person on spoke with on campus in medical records stated she did not have then either  She provided a different fax number to send the records over  Refaxed this morning  Sorry for the delay

## 2023-03-30 ENCOUNTER — TELEPHONE (OUTPATIENT)
Dept: OBGYN CLINIC | Facility: CLINIC | Age: 38
End: 2023-03-30

## 2023-03-30 DIAGNOSIS — N91.2 AMENORRHEA: Primary | ICD-10-CM

## 2023-03-30 DIAGNOSIS — N97.0 INFERTILITY ASSOCIATED WITH ANOVULATION: ICD-10-CM

## 2023-03-30 NOTE — TELEPHONE ENCOUNTER
Called and states spotting started on Monday/Tuesday  Will call 3/28/23 Day 1 to start Clomid on Saturday 4/1/2023  To start ovulation predictor cit on cycle day 13 and call with date of positive kit

## 2023-04-06 DIAGNOSIS — E88.81 INSULIN RESISTANCE: ICD-10-CM

## 2023-04-06 RX ORDER — METFORMIN HYDROCHLORIDE 500 MG/1
TABLET, EXTENDED RELEASE ORAL
Qty: 360 TABLET | Refills: 1 | Status: SHIPPED | OUTPATIENT
Start: 2023-04-06

## 2023-04-12 DIAGNOSIS — E03.9 HYPOTHYROIDISM, UNSPECIFIED TYPE: ICD-10-CM

## 2023-04-12 DIAGNOSIS — N92.6 IRREGULAR MENSES: Primary | ICD-10-CM

## 2023-04-24 ENCOUNTER — APPOINTMENT (OUTPATIENT)
Dept: LAB | Age: 38
End: 2023-04-24

## 2023-04-24 ENCOUNTER — TELEPHONE (OUTPATIENT)
Dept: OBGYN CLINIC | Facility: MEDICAL CENTER | Age: 38
End: 2023-04-24

## 2023-04-24 DIAGNOSIS — N92.6 IRREGULAR MENSES: Primary | ICD-10-CM

## 2023-04-24 DIAGNOSIS — N92.6 IRREGULAR MENSES: ICD-10-CM

## 2023-04-24 LAB — PROGEST SERPL-MCNC: 6.8 NG/ML

## 2023-05-01 DIAGNOSIS — N92.6 IRREGULAR MENSES: Primary | ICD-10-CM

## 2023-05-01 DIAGNOSIS — N97.0 INFERTILITY ASSOCIATED WITH ANOVULATION: ICD-10-CM

## 2023-05-03 DIAGNOSIS — N92.6 IRREGULAR MENSES: ICD-10-CM

## 2023-05-03 DIAGNOSIS — N97.0 INFERTILITY ASSOCIATED WITH ANOVULATION: ICD-10-CM

## 2023-05-08 DIAGNOSIS — F41.9 ANXIETY: ICD-10-CM

## 2023-05-08 RX ORDER — CITALOPRAM 40 MG/1
40 TABLET ORAL DAILY
Qty: 90 TABLET | Refills: 1 | Status: SHIPPED | OUTPATIENT
Start: 2023-05-08

## 2023-05-17 DIAGNOSIS — N97.0 INFERTILITY ASSOCIATED WITH ANOVULATION: Primary | ICD-10-CM

## 2023-05-18 ENCOUNTER — APPOINTMENT (OUTPATIENT)
Dept: LAB | Age: 38
End: 2023-05-18

## 2023-05-18 DIAGNOSIS — N97.0 INFERTILITY ASSOCIATED WITH ANOVULATION: ICD-10-CM

## 2023-05-19 LAB
ESTRADIOL SERPL-MCNC: 240.5 PG/ML
FSH SERPL-ACNC: 7.8 MIU/ML
LH SERPL-ACNC: 17.4 MIU/ML
PROGEST SERPL-MCNC: 1.2 NG/ML

## 2023-05-22 DIAGNOSIS — N97.0 INFERTILITY ASSOCIATED WITH ANOVULATION: Primary | ICD-10-CM

## 2023-06-02 ENCOUNTER — TELEPHONE (OUTPATIENT)
Dept: OBGYN CLINIC | Facility: MEDICAL CENTER | Age: 38
End: 2023-06-02

## 2023-06-02 DIAGNOSIS — N92.6 IRREGULAR MENSES: Primary | ICD-10-CM

## 2023-06-02 RX ORDER — ACETAMINOPHEN AND CODEINE PHOSPHATE 120; 12 MG/5ML; MG/5ML
1 SOLUTION ORAL DAILY
Qty: 28 TABLET | Refills: 0 | Status: SHIPPED | OUTPATIENT
Start: 2023-06-02 | End: 2023-06-22

## 2023-06-02 NOTE — TELEPHONE ENCOUNTER
Message form [place]  Is going on vacation and would like to prevent menses  Script for Micronor sent to pharm

## 2023-06-12 RX ORDER — CITALOPRAM HYDROBROMIDE 10 MG/1
10 TABLET ORAL DAILY
Qty: 1 TABLET | Refills: 0 | COMMUNITY
Start: 2023-06-12

## 2023-07-13 ENCOUNTER — APPOINTMENT (OUTPATIENT)
Dept: LAB | Age: 38
End: 2023-07-13
Payer: COMMERCIAL

## 2023-07-13 DIAGNOSIS — N92.6 IRREGULAR MENSES: ICD-10-CM

## 2023-07-13 DIAGNOSIS — N97.0 INFERTILITY ASSOCIATED WITH ANOVULATION: ICD-10-CM

## 2023-07-13 DIAGNOSIS — N91.2 AMENORRHEA: ICD-10-CM

## 2023-07-13 LAB
B-HCG SERPL-ACNC: <1 MIU/ML (ref 0–5)
PROGEST SERPL-MCNC: <0.1 NG/ML

## 2023-07-13 PROCEDURE — 36415 COLL VENOUS BLD VENIPUNCTURE: CPT

## 2023-07-13 PROCEDURE — 84702 CHORIONIC GONADOTROPIN TEST: CPT

## 2023-07-13 PROCEDURE — 84144 ASSAY OF PROGESTERONE: CPT

## 2023-07-14 DIAGNOSIS — N97.0 INFERTILITY ASSOCIATED WITH ANOVULATION: Primary | ICD-10-CM

## 2023-07-25 ENCOUNTER — OFFICE VISIT (OUTPATIENT)
Dept: FAMILY MEDICINE CLINIC | Facility: CLINIC | Age: 38
End: 2023-07-25
Payer: COMMERCIAL

## 2023-07-25 VITALS
BODY MASS INDEX: 46.06 KG/M2 | HEART RATE: 93 BPM | TEMPERATURE: 97.8 F | DIASTOLIC BLOOD PRESSURE: 92 MMHG | OXYGEN SATURATION: 96 % | WEIGHT: 286.6 LBS | SYSTOLIC BLOOD PRESSURE: 138 MMHG | HEIGHT: 66 IN

## 2023-07-25 DIAGNOSIS — Z00.00 ANNUAL PHYSICAL EXAM: Primary | ICD-10-CM

## 2023-07-25 DIAGNOSIS — R03.0 ELEVATED BP WITHOUT DIAGNOSIS OF HYPERTENSION: ICD-10-CM

## 2023-07-25 DIAGNOSIS — E55.9 VITAMIN D DEFICIENCY: ICD-10-CM

## 2023-07-25 DIAGNOSIS — E03.8 OTHER SPECIFIED HYPOTHYROIDISM: ICD-10-CM

## 2023-07-25 PROCEDURE — 99395 PREV VISIT EST AGE 18-39: CPT | Performed by: NURSE PRACTITIONER

## 2023-07-25 RX ORDER — FOLIC ACID 1 MG/1
1200 TABLET ORAL DAILY
COMMUNITY

## 2023-07-25 RX ORDER — MELATONIN
2000 DAILY
COMMUNITY

## 2023-07-25 RX ORDER — LANOLIN ALCOHOL/MO/W.PET/CERES
CREAM (GRAM) TOPICAL DAILY
COMMUNITY

## 2023-07-25 NOTE — PATIENT INSTRUCTIONS
Weight Management   AMBULATORY CARE:   Why it is important to manage your weight:  Being overweight increases your risk of health conditions such as heart disease, high blood pressure, type 2 diabetes, and certain types of cancer. It can also increase your risk for osteoarthritis, sleep apnea, and other respiratory problems. Aim for a slow, steady weight loss. Even a small amount of weight loss can lower your risk of health problems. Risks of being overweight:  Extra weight can cause many health problems, including the following:  • Diabetes (high blood sugar level)    • High blood pressure or high cholesterol    • Heart disease    • Stroke    • Gallbladder or liver disease    • Cancer of the colon, breast, prostate, liver, or kidney    • Sleep apnea    • Arthritis or gout    Screening  is done to check for health conditions before you have signs or symptoms. If you are 28to 79years old, your blood sugar level may be checked every 3 years for signs of prediabetes or diabetes. Your healthcare provider will check your blood pressure at each visit. High blood pressure can lead to a stroke or other problems. Your provider may check for signs of heart disease, cancer, or other health problems. How to lose weight safely:  A safe and healthy way to lose weight is to eat fewer calories and get regular exercise. • You can lose up about 1 pound a week by decreasing the number of calories you eat by 500 calories each day. You can decrease calories by eating smaller portion sizes or by cutting out high-calorie foods. Read labels to find out how many calories are in the foods you eat. • You can also burn calories with exercise such as walking, swimming, or biking. You will be more likely to keep weight off if you make these changes part of your lifestyle. Exercise at least 30 minutes per day on most days of the week.  You can also fit in more physical activity by taking the stairs instead of the elevator or parking farther away from stores. Ask your healthcare provider about the best exercise plan for you. Healthy meal plan for weight management:  A healthy meal plan includes a variety of foods, contains fewer calories, and helps you stay healthy. A healthy meal plan includes the following:     • Eat whole-grain foods more often. A healthy meal plan should contain fiber. Fiber is the part of grains, fruits, and vegetables that is not broken down by your body. Whole-grain foods are healthy and provide extra fiber in your diet. Some examples of whole-grain foods are whole-wheat breads and pastas, oatmeal, brown rice, and bulgur. • Eat a variety of vegetables every day. Include dark, leafy greens such as spinach, kale, brendon greens, and mustard greens. Eat yellow and orange vegetables such as carrots, sweet potatoes, and winter squash. • Eat a variety of fruits every day. Choose fresh or canned fruit (canned in its own juice or light syrup) instead of juice. Fruit juice has very little or no fiber. • Eat low-fat dairy foods. Drink fat-free (skim) milk or 1% milk. Eat fat-free yogurt and low-fat cottage cheese. Try low-fat cheeses such as mozzarella and other reduced-fat cheeses. • Choose meat and other protein foods that are low in fat. Choose beans or other legumes such as split peas or lentils. Choose fish, skinless poultry (chicken or turkey), or lean cuts of red meat (beef or pork). Before you cook meat or poultry, cut off any visible fat. • Use less fat and oil. Try baking foods instead of frying them. Add less fat, such as margarine, sour cream, regular salad dressing and mayonnaise to foods. Eat fewer high-fat foods. Some examples of high-fat foods include french fries, doughnuts, ice cream, and cakes. • Eat fewer sweets. Limit foods and drinks that are high in sugar. This includes candy, cookies, regular soda, and sweetened drinks. Ways to decrease calories:   • Eat smaller portions. ? Use a small plate with smaller servings. ? Do not eat second helpings. ? When you eat at a restaurant, ask for a box and place half of your meal in the box before you eat. ? Share an entrée with someone else. • Replace high-calorie snacks with healthy, low-calorie snacks. ? Choose fresh fruit, vegetables, fat-free rice cakes, or air-popped popcorn instead of potato chips, nuts, or chocolate. ? Choose water or calorie-free drinks instead of soda or sweetened drinks. • Do not shop for groceries when you are hungry. You may be more likely to make unhealthy food choices. Take a grocery list of healthy foods and shop after you have eaten. • Eat regular meals. Do not skip meals. Skipping meals can lead to overeating later in the day. This can make it harder for you to lose weight. Eat a healthy snack in place of a meal if you do not have time to eat a regular meal. Talk with a dietitian to help you create a meal plan and schedule that is right for you. Other things to consider as you try to lose weight:   • Be aware of situations that may give you the urge to overeat, such as eating while watching television. Find ways to avoid these situations. For example, read a book, go for a walk, or do crafts. • Meet with a weight loss support group or friends who are also trying to lose weight. This may help you stay motivated to continue working on your weight loss goals. © Copyright [employer] 2022 Information is for End User's use only and may not be sold, redistributed or otherwise used for commercial purposes. The above information is an  only. It is not intended as medical advice for individual conditions or treatments. Talk to your doctor, nurse or pharmacist before following any medical regimen to see if it is safe and effective for you.

## 2023-07-25 NOTE — PROGRESS NOTES
Cara Rojo is a 41 y.o.  female and is here for routine health maintenance. The patient reports problems - going through fertility treatments. History of Present Illness     HPI    Well Adult Physical   Patient here for a comprehensive physical exam.      Diet and Physical Activity  Diet: forgets to eat often  Weight concerns: Patient has class 3 obesity (BMI >40)  Exercise: infrequently      Depression Screen  PHQ-2/9 Depression Screening    Little interest or pleasure in doing things: 0 - not at all  Feeling down, depressed, or hopeless: 0 - not at all  PHQ-2 Score: 0  PHQ-2 Interpretation: Negative depression screen          General Health  Hearing: Normal:  bilateral  Vision: no vision problems, goes for regular eye exams, most recent eye exam >1 year and previous LASIK surgery  Dental: regular dental visits, brushes teeth twice daily and flosses teeth occasionally     History:  LMP:2023    : 0  Para: 0    Cancer Screening  Colononoscopy N/A  Mammogram N/A   Pap   Abnormal pap? no  Smoker Never   Annual screening with low-dose helical computed tomography (CT) for patients age 54 to 76 years with history of smoking at least 30 pack-years and, if a former smoker, had quit within the previous 15 years        The following portions of the patient's history were reviewed and updated as appropriate: allergies, current medications, past family history, past medical history, past social history, past surgical history and problem list.    Review of Systems     Review of Systems   Constitutional: Positive for fatigue. Negative for chills and fever. HENT: Negative for congestion, ear pain, postnasal drip, rhinorrhea, sinus pressure and sore throat. Eyes: Negative for pain and visual disturbance. Respiratory: Negative for cough, shortness of breath and wheezing. Cardiovascular: Negative for chest pain, palpitations and leg swelling.    Gastrointestinal: Negative for abdominal pain, blood in stool, constipation, diarrhea, nausea and vomiting. Endocrine: Negative for cold intolerance, heat intolerance, polydipsia, polyphagia and polyuria. Genitourinary: Positive for menstrual problem (POCS). Negative for dysuria, frequency, hematuria, pelvic pain, urgency and vaginal discharge. Musculoskeletal: Negative for arthralgias, back pain and gait problem. Skin: Negative for rash. Allergic/Immunologic: Negative for environmental allergies and food allergies. Neurological: Negative for dizziness and headaches. Hematological: Does not bruise/bleed easily. Psychiatric/Behavioral: Negative for dysphoric mood. The patient is not nervous/anxious.         Past Medical History     Past Medical History:   Diagnosis Date   • BMI 45.0-49.9, adult ([address] [facility])    • Herniated disc, cervical        Past Surgical History     Past Surgical History:   Procedure Laterality Date   • BREAST SURGERY      reduction   • TONSILLECTOMY         Social History     Social History     Socioeconomic History   • Marital status: /Civil Union     Spouse name: None   • Number of children: None   • Years of education: None   • Highest education level: None   Occupational History   • None   Tobacco Use   • Smoking status: Never   • Smokeless tobacco: Never   Vaping Use   • Vaping Use: Never used   Substance and Sexual Activity   • Alcohol use: Yes     Comment: soc   • Drug use: No   • Sexual activity: Yes     Partners: Male     Birth control/protection: None     Comment:    Other Topics Concern   • None   Social History Narrative   • None     Social Determinants of Health     Financial Resource Strain: Not on file   Food Insecurity: Not on file   Transportation Needs: Not on file   Physical Activity: Not on file   Stress: Not on file   Social Connections: Not on file   Intimate Partner Violence: Not on file   Housing Stability: Not on file       Family History     Family History   Problem Relation Age of Onset   • No Known Problems Mother    • Ovarian cancer Maternal Grandmother        Current Medications       Current Outpatient Medications:   •  Blood Pressure Monitor JORGE LUIS, Use in the morning, Disp: 1 each, Rfl: 0  •  cetirizine (ZyrTEC) 10 mg tablet, Take 10 mg by mouth daily, Disp: , Rfl:   •  cholecalciferol (VITAMIN D3) 1,000 units tablet, Take 2,000 Units by mouth daily, Disp: , Rfl:   •  citalopram (CeleXA) 40 mg tablet, Take 1 tablet (40 mg total) by mouth in the morning, Disp: 90 tablet, Rfl: 1  •  folic acid (FOLVITE) 1 mg tablet, Take 1,200 mg by mouth daily, Disp: , Rfl:   •  levothyroxine 125 mcg tablet, TAKE 1 TABLET BY MOUTH EVERY DAY, Disp: 90 tablet, Rfl: 2  •  metFORMIN (GLUCOPHAGE-XR) 500 mg 24 hr tablet, TAKE 2 PILL IN THE MORNING AND 2 PILLS AT NIGHT., Disp: 360 tablet, Rfl: 1  •  vitamin B-12 (VITAMIN B-12) 1,000 mcg tablet, Take by mouth daily, Disp: , Rfl:   •  Blood Glucose Monitoring Suppl (OneTouch Verio Flex System) w/Device KIT, Dispense 1 kit per insurance formulary. Prediabetes. (Patient not taking: Reported on 7/25/2023), Disp: 1 kit, Rfl: 0  •  clomiPHENE (CLOMID) 50 mg tablet, Take 2 tablets (100 mg total) by mouth daily for 5 days Start on cycle day 5, Disp: 10 tablet, Rfl: 0  •  OneTouch Delica Lancets 98C MISC, Use 1 a day to test FBG daily as advised by Dr. Dino Boyd. Prediabetes. (Patient not taking: Reported on 7/25/2023), Disp: 100 each, Rfl: 5  •  OneTouch Verio test strip, Test Fasting blood sugar daily and test one postprandial blood sugar as advised by Dr. Dino Boyd. Prediabetes.  (Patient not taking: Reported on 7/25/2023), Disp: 100 strip, Rfl: 5     Allergies     Allergies   Allergen Reactions   • Erythromycin Rash       Objective     /92 (BP Location: Left arm, Patient Position: Sitting, Cuff Size: Large)   Pulse 93   Temp 97.8 °F (36.6 °C) (Temporal)   Ht 5' 5.75" (1.67 m)   Wt 130 kg (286 lb 9.6 oz)   SpO2 96%   BMI 46.61 kg/m²   Wt Readings from Last 3 Encounters:   07/25/23 130 kg (286 lb 9.6 oz)   10/06/22 127 kg (280 lb 4.8 oz)   07/20/22 129 kg (283 lb 9.6 oz)     BP Readings from Last 3 Encounters:   07/25/23 138/92   10/06/22 118/70   07/20/22 122/88     Pulse Readings from Last 3 Encounters:   07/25/23 93   07/20/22 95   04/28/22 91     Body mass index is 46.61 kg/m². Physical Exam  Vitals reviewed. Constitutional:       Appearance: Normal appearance. She is well-developed. She is obese. HENT:      Head: Normocephalic. Right Ear: Tympanic membrane, ear canal and external ear normal.      Left Ear: Tympanic membrane, ear canal and external ear normal.      Nose: Nose normal.      Mouth/Throat:      Mouth: Mucous membranes are moist.      Pharynx: Oropharynx is clear. Eyes:      Extraocular Movements: Extraocular movements intact. Conjunctiva/sclera: Conjunctivae normal.      Pupils: Pupils are equal, round, and reactive to light. Neck:      Thyroid: No thyromegaly. Cardiovascular:      Rate and Rhythm: Normal rate and regular rhythm. Pulses: Normal pulses. Heart sounds: Normal heart sounds. Pulmonary:      Effort: Pulmonary effort is normal.      Breath sounds: Normal breath sounds. Abdominal:      General: Bowel sounds are normal. There is no distension. Palpations: Abdomen is soft. There is no mass. Tenderness: There is no abdominal tenderness. There is no right CVA tenderness, left CVA tenderness, guarding or rebound. Hernia: No hernia is present. Musculoskeletal:         General: Normal range of motion. Cervical back: Normal range of motion and neck supple. Lymphadenopathy:      Cervical: No cervical adenopathy. Skin:     General: Skin is warm and dry. Capillary Refill: Capillary refill takes less than 2 seconds. Neurological:      Mental Status: She is alert and oriented to person, place, and time. Deep Tendon Reflexes: Reflexes are normal and symmetric.    Psychiatric: Mood and Affect: Mood normal.         Behavior: Behavior normal.           No results found.     Health Maintenance     Health Maintenance   Topic Date Due   • COVID-19 Vaccine (4 - Moderna series) 12/29/2021   • Influenza Vaccine (1) 09/01/2023   • Depression Screening  07/25/2024   • BMI: Followup Plan  07/25/2024   • BMI: Adult  07/25/2024   • Annual Physical  07/25/2024   • Cervical Cancer Screening  09/30/2026   • DTaP,Tdap,and Td Vaccines (8 - Td or Tdap) 03/03/2030   • HIV Screening  Completed   • Hepatitis C Screening  Completed   • IPV Vaccine  Completed   • Meningococcal ACWY Vaccine  Completed   • HPV Vaccine  Completed   • Pneumococcal Vaccine: Pediatrics (0 to 5 Years) and At-Risk Patients (6 to 59 Years)  Aged Out   • HIB Vaccine  Aged Out   • Hepatitis A Vaccine  Aged [employer] History   Administered Date(s) Administered   • COVID-19 MODERNA VACC 0.5 ML IM 02/16/2021, 03/23/2021, 11/03/2021   • DTP 1986, 1986, 08/27/1987, 07/24/1991   • HPV Quadrivalent 03/12/2007, 05/15/2007, 11/21/2007   • Hep B, Adolescent or Pediatric 07/28/1997, 09/02/1997, 02/17/1998   • INFLUENZA 11/05/2011, 11/07/2020, 11/07/2022   • IPV 1986, 1986, 08/27/1987, 07/24/1991   • MMR 01/26/1987, 07/27/1992   • Meningococcal MCV4P 07/28/2004   • TD (adult) Preservative Free 03/03/2020   • Td (adult), Unspecified 05/08/2001, 08/29/2008   • Td (adult), adsorbed 03/03/2020   • Tdap 08/29/2008   • Tuberculin Skin Test-PPD Intradermal 08/11/2008         Laboratory Results:   Lab Results   Component Value Date    WBC 9.45 02/18/2022    WBC 8.69 05/19/2020    WBC 8.76 06/12/2019    HGB 14.1 02/18/2022    HGB 14.4 05/19/2020    HGB 14.7 06/12/2019    HCT 43.5 02/18/2022    HCT 45.2 05/19/2020    HCT 44.1 06/12/2019    MCV 88 02/18/2022    MCV 90 05/19/2020    MCV 88 06/12/2019     02/18/2022     05/19/2020     06/12/2019     Lab Results   Component Value Date    BUN 16 02/18/2022 BUN 18 05/19/2020    BUN 16 06/12/2019     Lab Results   Component Value Date    ALT 42 02/18/2022    ALT 27 05/19/2020    ALT 24 06/12/2019    AST 24 02/18/2022    AST 14 05/19/2020    AST 11 06/12/2019     No results found for: "TSH"  No results found for: "A1C"    Lipid Profile:   No results found for: "CHOL"  Lab Results   Component Value Date    HDL 38 (L) 02/18/2022    HDL 36 (L) 05/19/2020    HDL 34 (L) 06/12/2019     No results found for: "LDLC"  Lab Results   Component Value Date    LDLCALC 139 (H) 02/18/2022    LDLCALC 147 (H) 05/19/2020    LDLCALC 132 (H) 06/12/2019     Lab Results   Component Value Date    TRIG 281 (H) 02/18/2022    TRIG 209 (H) 05/19/2020    TRIG 201 (H) 06/12/2019                 Assessment/Plan     1. Annual physical exam  -     CBC and differential; Future  -     Comprehensive metabolic panel; Future  -     Lipid panel; Future    2. Other specified hypothyroidism  Comments:  Continue with current dose    3. Elevated BP without diagnosis of hypertension  -     Blood Pressure Monitor JORGE LUIS; Use in the morning    4. BMI 45.0-49.9, adult (HCC)  Comments:  Discussed diet and exercise  Orders:  -     Hemoglobin A1C; Future  -     TSH, 3rd generation with Free T4 reflex; Future    5. Vitamin D deficiency  -     Vitamin D 25 hydroxy; Future          1. Healthy female exam.  2. Patient Counseling:   · Nutrition: Stressed importance of a well balanced diet, moderation of sodium/saturated fat, caloric balance and sufficient intake of fiber  · Exercise: Stressed the importance of regular exercise with a goal of 150 minutes per week  · Dental Health: Discussed daily flossing and brushing and regular dental visits   · Sexuality: Discussed sexually transmitted infections, use of condoms and prevention of unintended pregnancy  · Alcohol Use:  Recommended moderation of alcohol intake  · Injury Prevention: Discussed Safety Belts, Safety Helmets, and Smoke Detectors    · Immunizations reviewed. Yes  · Discussed benefits of screening Yes. · Discussed the patient's BMI with her. The BMI is above average; BMI management plan is completed  3. Cancer Screening Up to date  4. Labs ordered  5. Follow up next physical in 1 year. FLORENTINO Espinoza     BMI Counseling: Body mass index is 46.61 kg/m². The BMI is above normal. Nutrition recommendations include reducing portion sizes, decreasing overall calorie intake, 3-5 servings of fruits/vegetables daily, reducing fast food intake, consuming healthier snacks, decreasing soda and/or juice intake, moderation in carbohydrate intake and increasing intake of lean protein. Exercise recommendations include exercising 3-5 times per week.

## 2023-08-01 DIAGNOSIS — Z11.1 SCREENING-PULMONARY TB: Primary | ICD-10-CM

## 2023-08-02 ENCOUNTER — APPOINTMENT (OUTPATIENT)
Dept: LAB | Age: 38
End: 2023-08-02
Payer: COMMERCIAL

## 2023-08-02 DIAGNOSIS — Z11.1 SCREENING-PULMONARY TB: ICD-10-CM

## 2023-08-02 DIAGNOSIS — E55.9 VITAMIN D DEFICIENCY: ICD-10-CM

## 2023-08-02 DIAGNOSIS — Z00.00 ANNUAL PHYSICAL EXAM: ICD-10-CM

## 2023-08-02 LAB
25(OH)D3 SERPL-MCNC: 41.5 NG/ML (ref 30–100)
ALBUMIN SERPL BCP-MCNC: 3.4 G/DL (ref 3.5–5)
ALP SERPL-CCNC: 55 U/L (ref 46–116)
ALT SERPL W P-5'-P-CCNC: 72 U/L (ref 12–78)
ANION GAP SERPL CALCULATED.3IONS-SCNC: 6 MMOL/L
AST SERPL W P-5'-P-CCNC: 41 U/L (ref 5–45)
BASOPHILS # BLD AUTO: 0.07 THOUSANDS/ÂΜL (ref 0–0.1)
BASOPHILS NFR BLD AUTO: 1 % (ref 0–1)
BILIRUB SERPL-MCNC: 0.28 MG/DL (ref 0.2–1)
BUN SERPL-MCNC: 14 MG/DL (ref 5–25)
CALCIUM ALBUM COR SERPL-MCNC: 9.5 MG/DL (ref 8.3–10.1)
CALCIUM SERPL-MCNC: 9 MG/DL (ref 8.3–10.1)
CHLORIDE SERPL-SCNC: 109 MMOL/L (ref 96–108)
CHOLEST SERPL-MCNC: 222 MG/DL
CO2 SERPL-SCNC: 23 MMOL/L (ref 21–32)
CREAT SERPL-MCNC: 0.84 MG/DL (ref 0.6–1.3)
EOSINOPHIL # BLD AUTO: 0.41 THOUSAND/ÂΜL (ref 0–0.61)
EOSINOPHIL NFR BLD AUTO: 5 % (ref 0–6)
ERYTHROCYTE [DISTWIDTH] IN BLOOD BY AUTOMATED COUNT: 12.4 % (ref 11.6–15.1)
EST. AVERAGE GLUCOSE BLD GHB EST-MCNC: 126 MG/DL
GFR SERPL CREATININE-BSD FRML MDRD: 89 ML/MIN/1.73SQ M
GLUCOSE P FAST SERPL-MCNC: 102 MG/DL (ref 65–99)
HBA1C MFR BLD: 6 %
HCT VFR BLD AUTO: 40.8 % (ref 34.8–46.1)
HDLC SERPL-MCNC: 36 MG/DL
HGB BLD-MCNC: 13.3 G/DL (ref 11.5–15.4)
IMM GRANULOCYTES # BLD AUTO: 0.03 THOUSAND/UL (ref 0–0.2)
IMM GRANULOCYTES NFR BLD AUTO: 0 % (ref 0–2)
LDLC SERPL CALC-MCNC: 133 MG/DL (ref 0–100)
LYMPHOCYTES # BLD AUTO: 3 THOUSANDS/ÂΜL (ref 0.6–4.47)
LYMPHOCYTES NFR BLD AUTO: 35 % (ref 14–44)
MCH RBC QN AUTO: 29.2 PG (ref 26.8–34.3)
MCHC RBC AUTO-ENTMCNC: 32.6 G/DL (ref 31.4–37.4)
MCV RBC AUTO: 90 FL (ref 82–98)
MONOCYTES # BLD AUTO: 0.41 THOUSAND/ÂΜL (ref 0.17–1.22)
MONOCYTES NFR BLD AUTO: 5 % (ref 4–12)
NEUTROPHILS # BLD AUTO: 4.57 THOUSANDS/ÂΜL (ref 1.85–7.62)
NEUTS SEG NFR BLD AUTO: 54 % (ref 43–75)
NONHDLC SERPL-MCNC: 186 MG/DL
NRBC BLD AUTO-RTO: 0 /100 WBCS
PLATELET # BLD AUTO: 344 THOUSANDS/UL (ref 149–390)
PMV BLD AUTO: 10.8 FL (ref 8.9–12.7)
POTASSIUM SERPL-SCNC: 4.1 MMOL/L (ref 3.5–5.3)
PROT SERPL-MCNC: 7.3 G/DL (ref 6.4–8.4)
RBC # BLD AUTO: 4.55 MILLION/UL (ref 3.81–5.12)
SODIUM SERPL-SCNC: 138 MMOL/L (ref 135–147)
TRIGL SERPL-MCNC: 265 MG/DL
TSH SERPL DL<=0.05 MIU/L-ACNC: 2.87 UIU/ML (ref 0.45–4.5)
WBC # BLD AUTO: 8.49 THOUSAND/UL (ref 4.31–10.16)

## 2023-08-02 PROCEDURE — 83036 HEMOGLOBIN GLYCOSYLATED A1C: CPT

## 2023-08-02 PROCEDURE — 84443 ASSAY THYROID STIM HORMONE: CPT

## 2023-08-02 PROCEDURE — 85025 COMPLETE CBC W/AUTO DIFF WBC: CPT

## 2023-08-02 PROCEDURE — 86480 TB TEST CELL IMMUN MEASURE: CPT

## 2023-08-02 PROCEDURE — 80053 COMPREHEN METABOLIC PANEL: CPT

## 2023-08-02 PROCEDURE — 82306 VITAMIN D 25 HYDROXY: CPT

## 2023-08-02 PROCEDURE — 36415 COLL VENOUS BLD VENIPUNCTURE: CPT

## 2023-08-02 PROCEDURE — 80061 LIPID PANEL: CPT

## 2023-08-04 ENCOUNTER — APPOINTMENT (OUTPATIENT)
Dept: LAB | Age: 38
End: 2023-08-04
Payer: COMMERCIAL

## 2023-08-04 DIAGNOSIS — N97.0 INFERTILITY ASSOCIATED WITH ANOVULATION: ICD-10-CM

## 2023-08-04 LAB
GAMMA INTERFERON BACKGROUND BLD IA-ACNC: 0.02 IU/ML
M TB IFN-G BLD-IMP: NEGATIVE
M TB IFN-G CD4+ BCKGRND COR BLD-ACNC: 0.01 IU/ML
M TB IFN-G CD4+ BCKGRND COR BLD-ACNC: 0.01 IU/ML
MITOGEN IGNF BCKGRD COR BLD-ACNC: 6.44 IU/ML
PROGEST SERPL-MCNC: 8.83 NG/ML

## 2023-08-04 PROCEDURE — 36415 COLL VENOUS BLD VENIPUNCTURE: CPT

## 2023-08-04 PROCEDURE — 84144 ASSAY OF PROGESTERONE: CPT

## 2023-08-08 ENCOUNTER — OFFICE VISIT (OUTPATIENT)
Dept: FAMILY MEDICINE CLINIC | Facility: CLINIC | Age: 38
End: 2023-08-08
Payer: COMMERCIAL

## 2023-08-08 VITALS
BODY MASS INDEX: 46.68 KG/M2 | RESPIRATION RATE: 18 BRPM | SYSTOLIC BLOOD PRESSURE: 126 MMHG | WEIGHT: 287 LBS | HEART RATE: 100 BPM | OXYGEN SATURATION: 98 % | DIASTOLIC BLOOD PRESSURE: 80 MMHG | TEMPERATURE: 97.6 F

## 2023-08-08 DIAGNOSIS — R73.03 PREDIABETES: Primary | ICD-10-CM

## 2023-08-08 PROCEDURE — 99214 OFFICE O/P EST MOD 30 MIN: CPT | Performed by: NURSE PRACTITIONER

## 2023-08-08 NOTE — PROGRESS NOTES
Subjective:   Chief Complaint   Patient presents with   • Follow-up     Blood test and physical form complete        Patient ID: Ida Magaña is a 41 y.o. female. Presents today for discussion of blood work results. Discussed with patient A1c of 6.0 places her as prediabetic. She is currently being followed by maternal-fetal medicine and an attempt to become pregnant. They have currently placed her on metformin 1000 mg twice daily as well as Clomid to help with conception. Carbohydrate counting booklet was given and discussed. A goal of less than 140 g of carbohydrates daily given. Discussed also that her lipid panel showed a high total cholesterol of 222, triglycerides of 265 and LDL borderline high at 133. Discussed heart healthy diet watching fast, fried, and dairy foods and focusing more on fruits, vegetables and leaner meats. Discussed good fats i.e. olive oil and avocado. All questions were asked and answered. More than half of this 20 minute visit spent counseling and coordinating care. The following portions of the patient's history were reviewed and updated as appropriate: allergies, current medications, past family history, past medical history, past social history, past surgical history and problem list.    Review of Systems          Objective:  Vitals:    08/08/23 1041   BP: 126/80   BP Location: Left arm   Patient Position: Sitting   Cuff Size: Large   Pulse: 100   Resp: 18   Temp: 97.6 °F (36.4 °C)   TempSrc: Temporal   SpO2: 98%   Weight: 130 kg (287 lb)      Physical Exam      Assessment/Plan:    No problem-specific Assessment & Plan notes found for this encounter.        Diagnoses and all orders for this visit:    Prediabetes  Comments:  Discussed carbohydrate counting and exercise

## 2023-08-10 DIAGNOSIS — N97.0 INFERTILITY ASSOCIATED WITH ANOVULATION: ICD-10-CM

## 2023-08-10 DIAGNOSIS — N92.6 IRREGULAR MENSES: ICD-10-CM

## 2023-08-14 DIAGNOSIS — E03.9 HYPOTHYROIDISM, UNSPECIFIED TYPE: ICD-10-CM

## 2023-08-16 RX ORDER — LEVOTHYROXINE SODIUM 0.12 MG/1
TABLET ORAL
Qty: 90 TABLET | Refills: 2 | Status: SHIPPED | OUTPATIENT
Start: 2023-08-16

## 2023-10-30 ENCOUNTER — RA CDI HCC (OUTPATIENT)
Dept: OTHER | Facility: HOSPITAL | Age: 38
End: 2023-10-30

## 2023-10-30 NOTE — PROGRESS NOTES
[address] [facility] coding opportunities          Chart Reviewed number of suggestions sent to Provider: 1  E66.01     Patients Insurance        Commercial Insurance: [employer]

## 2023-11-27 DIAGNOSIS — E88.819 INSULIN RESISTANCE: ICD-10-CM

## 2023-11-27 RX ORDER — METFORMIN HYDROCHLORIDE 500 MG/1
TABLET, EXTENDED RELEASE ORAL
Qty: 360 TABLET | Refills: 1 | Status: SHIPPED | OUTPATIENT
Start: 2023-11-27

## 2023-12-06 ENCOUNTER — TELEPHONE (OUTPATIENT)
Dept: OBGYN CLINIC | Facility: MEDICAL CENTER | Age: 38
End: 2023-12-06

## 2023-12-24 ENCOUNTER — HOSPITAL ENCOUNTER (EMERGENCY)
Facility: HOSPITAL | Age: 38
Discharge: HOME | End: 2023-12-25
Attending: EMERGENCY MEDICINE
Payer: COMMERCIAL

## 2023-12-24 ENCOUNTER — APPOINTMENT (EMERGENCY)
Dept: RADIOLOGY | Facility: HOSPITAL | Age: 38
End: 2023-12-24
Payer: COMMERCIAL

## 2023-12-24 VITALS
HEART RATE: 92 BPM | SYSTOLIC BLOOD PRESSURE: 154 MMHG | OXYGEN SATURATION: 95 % | TEMPERATURE: 97.7 F | DIASTOLIC BLOOD PRESSURE: 88 MMHG | RESPIRATION RATE: 18 BRPM

## 2023-12-24 DIAGNOSIS — S99.911A INJURY OF RIGHT ANKLE, INITIAL ENCOUNTER: Primary | ICD-10-CM
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
## 2023-12-24 LAB
EXT PREGNANCY TEST URINE: NEGATIVE
EXT. CONTROL: NORMAL

## 2023-12-24 PROCEDURE — 73564 X-RAY EXAM KNEE 4 OR MORE: CPT

## 2023-12-24 PROCEDURE — 73630 X-RAY EXAM OF FOOT: CPT

## 2023-12-24 PROCEDURE — 81025 URINE PREGNANCY TEST: CPT | Performed by: PHYSICIAN ASSISTANT

## 2023-12-24 PROCEDURE — 73610 X-RAY EXAM OF ANKLE: CPT

## 2023-12-24 PROCEDURE — 99284 EMERGENCY DEPT VISIT MOD MDM: CPT

## 2023-12-24 PROCEDURE — 99284 EMERGENCY DEPT VISIT MOD MDM: CPT | Performed by: PHYSICIAN ASSISTANT

## 2023-12-24 RX ORDER — ACETAMINOPHEN 325 MG/1
975 TABLET ORAL ONCE
Status: COMPLETED | OUTPATIENT
Start: 2023-12-24 | End: 2023-12-24

## 2023-12-24 RX ADMIN — ACETAMINOPHEN 325MG 975 MG: 325 TABLET ORAL at 22:52

## 2023-12-24 NOTE — Clinical Note
Estefany Rangel was seen and treated in our emergency department on 12/24/2023.                Diagnosis:     Estefany  .    She may return on this date: 12/27/2023         If you have any questions or concerns, please don't hesitate to call.      Josefa Shearer PA-C    ______________________________           _______________          _______________  Hospital Representative                              Date                                Time

## 2023-12-25 NOTE — ED PROVIDER NOTES
History  Chief Complaint   Patient presents with    Ankle Pain     Pt reports carrying present down stairs when she slipped down a few steps. C/o R ankle and knee pain.      Estefany Rangel is a 38 y.o. female with no significant past medical history presenting to ER complaining of right foot, ankle, and lower leg pain after she slipped and fell after missing the last step just prior to arrival.  She reports that she twisted her right ankle and fell directly onto her back.  She denies any back pain or hip pain.  She reports the pain is only present her foot, ankle, and lower leg.  She denies any numbness or tingling.  She is able to ambulate.  She did take naproxen for pain.  Patient denies head strike, headache, LOC, or thinners.        Prior to Admission Medications   Prescriptions Last Dose Informant Patient Reported? Taking?   Blood Glucose Monitoring Suppl (OneTouch Verio Flex System) w/Device KIT   No No   Sig: Dispense 1 kit per insurance formulary. Prediabetes.   Patient not taking: Reported on 7/25/2023   Blood Pressure Monitor JORGE LIUS   No No   Sig: Use in the morning   Patient not taking: Reported on 8/8/2023   OneTouch Delica Lancets 33G MISC   No No   Sig: Use 1 a day to test FBG daily as advised by Dr. Reddy. Prediabetes.   Patient not taking: Reported on 7/25/2023   OneTouch Verio test strip   No No   Sig: Test Fasting blood sugar daily and test one postprandial blood sugar as advised by Dr. Reddy. Prediabetes.   Patient not taking: Reported on 7/25/2023   cetirizine (ZyrTEC) 10 mg tablet  Self Yes No   Sig: Take 10 mg by mouth daily   cholecalciferol (VITAMIN D3) 1,000 units tablet   Yes No   Sig: Take 2,000 Units by mouth daily   citalopram (CeleXA) 40 mg tablet   No No   Sig: TAKE 1 TABLET (40 MG TOTAL) BY MOUTH IN THE MORNING   clomiPHENE (CLOMID) 50 mg tablet   No No   Sig: Take 2 tablets (100 mg total) by mouth daily for 5 days Start on cycle day 5   folic acid (FOLVITE) 1 mg tablet   Yes No    Sig: Take 1,200 mg by mouth daily   levothyroxine 125 mcg tablet   No No   Sig: TAKE 1 TABLET BY MOUTH EVERY DAY   metFORMIN (GLUCOPHAGE-XR) 500 mg 24 hr tablet   No No   Sig: TAKE 2 PILL IN THE MORNING AND 2 PILLS AT NIGHT.   vitamin B-12 (VITAMIN B-12) 1,000 mcg tablet   Yes No   Sig: Take by mouth daily      Facility-Administered Medications: None       Past Medical History:   Diagnosis Date    BMI 45.0-49.9, adult (HCC)     Herniated disc, cervical        Past Surgical History:   Procedure Laterality Date    BREAST SURGERY      reduction    TONSILLECTOMY         Family History   Problem Relation Age of Onset    No Known Problems Mother     Ovarian cancer Maternal Grandmother      I have reviewed and agree with the history as documented.    E-Cigarette/Vaping    E-Cigarette Use Never User      E-Cigarette/Vaping Substances    Nicotine No     THC No     CBD No     Flavoring No     Other No     Unknown No      Social History     Tobacco Use    Smoking status: Never    Smokeless tobacco: Never   Vaping Use    Vaping status: Never Used   Substance Use Topics    Alcohol use: Yes     Comment: soc    Drug use: No       Review of Systems   Constitutional:  Negative for chills and fever.   HENT:  Negative for ear pain and sore throat.    Eyes:  Negative for pain and visual disturbance.   Respiratory:  Negative for cough and shortness of breath.    Cardiovascular:  Negative for chest pain and palpitations.   Gastrointestinal:  Negative for abdominal pain and vomiting.   Genitourinary:  Negative for dysuria and hematuria.   Musculoskeletal:  Negative for arthralgias and back pain.   Skin:  Negative for color change and rash.   Neurological:  Negative for seizures and syncope.   All other systems reviewed and are negative.      Physical Exam  Physical Exam  Vitals and nursing note reviewed.   Constitutional:       General: She is not in acute distress.     Appearance: She is well-developed. She is not ill-appearing,  toxic-appearing or diaphoretic.   HENT:      Head: Normocephalic and atraumatic.   Eyes:      Conjunctiva/sclera: Conjunctivae normal.   Cardiovascular:      Rate and Rhythm: Normal rate and regular rhythm.      Heart sounds: No murmur heard.  Pulmonary:      Effort: Pulmonary effort is normal. No respiratory distress.      Breath sounds: Normal breath sounds.   Abdominal:      Palpations: Abdomen is soft.      Tenderness: There is no abdominal tenderness.   Musculoskeletal:         General: No swelling.      Cervical back: Neck supple.      Right lower leg: Tenderness (Minimal tenderness to palpation at proximal fibula.  No calf tenderness or swelling.) present.      Right ankle: Tenderness present over the lateral malleolus. Normal pulse.      Left ankle: Normal pulse.      Right foot: Normal capillary refill. Tenderness (Lateral aspect of foot.) present. Normal pulse.      Left foot: Normal capillary refill. Normal pulse.      Comments: Tenderness to palpation along the lateral aspect of right foot, right ankle, and proximal fibula.  No swelling, obvious deformity, or open wounds.  Distally neurovascularly intact.  DP pulses 2+ bilaterally.   Skin:     General: Skin is warm and dry.      Capillary Refill: Capillary refill takes less than 2 seconds.   Neurological:      Mental Status: She is alert.   Psychiatric:         Mood and Affect: Mood normal.         Vital Signs  ED Triage Vitals [12/24/23 2234]   Temperature Pulse Respirations Blood Pressure SpO2   97.7 °F (36.5 °C) 92 18 154/88 95 %      Temp Source Heart Rate Source Patient Position - Orthostatic VS BP Location FiO2 (%)   Oral Monitor Sitting Right arm --      Pain Score       8           Vitals:    12/24/23 2234   BP: 154/88   Pulse: 92   Patient Position - Orthostatic VS: Sitting         Visual Acuity      ED Medications  Medications   acetaminophen (TYLENOL) tablet 975 mg (975 mg Oral Given 12/24/23 2255)       Diagnostic Studies  Results Reviewed        Procedure Component Value Units Date/Time    POCT pregnancy, urine [[id number]]  (Normal) Resulted: 12/24/23 2255    Lab Status: Final result Updated: 12/24/23 2255     EXT Preg Test, Ur Negative     Control Valid                   XR foot 3+ views RIGHT   ED Interpretation by Josefa Shearer PA-C (12/24 2330)   No acute osseous abnormality when interpreted by me.      XR ankle 3+ views RIGHT   ED Interpretation by Josefa Shearer PA-C (12/24 2330)   No acute osseous abnormality when interpreted by me.        XR knee 4+ views Right injury   ED Interpretation by Josefa Shearer PA-C (12/24 2330)   No acute osseous abnormality when interpreted by me.                   Procedures  Procedures         ED Course                                             Medical Decision Making  Estefany Rangel is a 38 y.o. female with no significant past medical history presenting to ER complaining of right foot, ankle, and lower leg pain after she slipped and fell after missing the last step just prior to arrival.  On exam patient is well-appearing no acute chest pain vital signs within normal limits.  Physical examination reveals tenderness to palpation along the lateral aspect of right foot, right ankle, and proximal fibula.  No swelling, obvious deformity, or open wounds.  Distally neurovascularly intact.  DP pulses 2+ bilaterally. Examination is otherwise unremarkable.  Will order x-ray to rule out fracture.  Patient agreeable with plan.    X-rays reveal no acute osseous abnormalities when interpreted by me.  Patient given Aircast for comfort and crutches.  Discussed with patient appropriate supportive care at home including but not limited to rest, ice, elevation.  Advised patient to follow closely with orthopedics and with PCP.  Provided patient with contact information for orthopedics.  Advised strict return precautions to the ER.  Patient is understanding and agreeable with plan.  Patient has remained stable throughout  stay in ER and stable for discharge.    Problems Addressed:  Fall, initial encounter: acute illness or injury  Injury of right ankle, initial encounter: acute illness or injury    Amount and/or Complexity of Data Reviewed  Labs: ordered.  Radiology: ordered and independent interpretation performed.    Risk  OTC drugs.             Disposition  Final diagnoses:   Injury of right ankle, initial encounter   Fall, initial encounter     Time reflects when diagnosis was documented in both MDM as applicable and the Disposition within this note       Time User Action Codes Description Comment    12/24/2023 11:35 PM Josefa Shearer Add [S99.911A] Injury of right ankle, initial encounter     12/24/2023 11:35 PM Josefa Shearer Add [W19.XXXA] Fall, initial encounter           ED Disposition       ED Disposition   Discharge    Condition   Stable    Date/Time   Sun Dec 24, 2023 11:35 PM    Comment   Estefany Rangel discharge to home/self care.                   Follow-up Information       Follow up With Specialties Details Why Contact Info Additional Information    [facility] [place] [facility] Schedule an appointment as soon as possible for a visit in 1 day  [address]  [facility] [address] [phone] [facility] [place], [address] [place], [place] [address], [address] [place] [address], [address] [phone]    José Luis Sheikh DO Family Medicine Schedule an appointment as soon as possible for a visit in 1 day  [address] [facility] [address]  [phone]       [facility] Emergency Department Emergency Medicine  If symptoms worsen [address] [facility] [address]  [phone] [facility] Emergency Department, [address] [place], [place], [address]            Patient's Medications   Discharge Prescriptions    No medications on file       No discharge procedures on  file.    PDMP Review       None            ED Provider  Electronically Signed by             Josefa Shearer PA-C  12/24/23 6153

## 2024-01-01 ENCOUNTER — TELEPHONE (OUTPATIENT)
Dept: OTHER | Facility: OTHER | Age: 39
End: 2024-01-01

## 2024-01-02 ENCOUNTER — OFFICE VISIT (OUTPATIENT)
Dept: OBGYN CLINIC | Facility: CLINIC | Age: 39
End: 2024-01-02
Payer: COMMERCIAL

## 2024-01-02 VITALS
SYSTOLIC BLOOD PRESSURE: 137 MMHG | HEART RATE: 88 BPM | BODY MASS INDEX: 45.48 KG/M2 | DIASTOLIC BLOOD PRESSURE: 83 MMHG | WEIGHT: 283 LBS | HEIGHT: 66 IN

## 2024-01-02 DIAGNOSIS — S93.401A SPRAIN OF UNSPECIFIED LIGAMENT OF RIGHT ANKLE, INITIAL ENCOUNTER: Primary | ICD-10-CM

## 2024-01-02 PROCEDURE — 99204 OFFICE O/P NEW MOD 45 MIN: CPT | Performed by: STUDENT IN AN ORGANIZED HEALTH CARE EDUCATION/TRAINING PROGRAM

## 2024-01-02 RX ORDER — MELOXICAM 15 MG/1
15 TABLET ORAL DAILY
Qty: 30 TABLET | Refills: 0 | Status: SHIPPED | OUTPATIENT
Start: 2024-01-02 | End: 2024-02-01

## 2024-01-02 NOTE — PROGRESS NOTES
Ortho Sports Medicine New Patient Ankle Visit    Assesment:  38 y.o. female with right ankle pain ongoing for 9 days following a possible hyperextension injury.  Exam consistent with a right ankle sprain.    Plan:  I reviewed the history, exam, and imaging with the patient in clinic today.  Her x-rays are negative for fracture.  She has tenderness over the medial and lateral malleolus without significant tenderness anywhere else in the ankle or foot.  I discussed with the patient that she most likely has an ankle sprain.  I discussed treatment options focusing on conservative management including ice, rest, elevation, anti-inflammatories, immobilization, and physical therapy.  The patient has been wearing a Aircast, elevating, icing, and taking Aleve.  She did have a recent exacerbation of her symptoms.  I discussed transitioning from an Aircast to a cam boot and starting physical therapy.  I also recommended a month of meloxicam to help with swelling and pain.  I did encourage her to continue icing and elevating the ankle.  Patient demonstrated understanding discussion was agreed with the plan.  All of her questions were answered.  I provided her with prescription physical therapy as well as Mobic prescription of meloxicam.  I did mention that if she develops any GI issues she should stop the meloxicam immediately.  I recommend that she follow-up in 4 weeks for repeat evaluation.  She will reach out to clinic with any question concerns at any time.    Conservative treatment:  Ice to knee for 20 minutes at least 1-2 times daily.  PT for ROM/strengthening to knee, hip and core.  Prescription NSAIDS for pain (meloxicam).  Low tide boot ordered and dispensed to patient to wear for ambulation.  Work not provided with limitations (no stairs and must be able to wear boot/brace while at work).    Imaging:  All imaging from prior to today was reviewed by myself and explained to the patient.     Injection:  No Injection  planned at this time.    Surgery:   No surgery is recommended at this point, continue with conservative treatment plan as noted.    Follow up:  Return in about 4 weeks (around 1/30/2024).      Chief Complaint   Patient presents with    Right Ankle - Pain       History of Present Illness:  The patient is a 38 y.o. female seen in clinic for right ankle pain. The pain started 1.5 weeks ago. The mechanism of injury was a slip and fall after missing a step with a hyperextension injury. Patient felt immediate pain, and was unable to ambulate following the injury.  She then presented to the ED where x-rays did not reveal any fractures.  The patient was provided an aircast and crutches as well as a referral to orthopedics. The pain today continues to be located laterally as well as anteriorly and medially.  The patient had another episode overnight where she almost slipped on water in the bathroom which exacerbated her symptoms.  This morning the patient's  thought that the ankle appeared more swollen, which was new as swelling did not appear following the injury.  The patient characterizes the intensity of pain as a 4-5 out of 10 at worst. Symptoms are aggravated by weightbearing. The patient denies any pain at rest. The patient has tried Naproxen, aircast, crutches for ambulation, ice, rest and elevation. Symptoms have improved since the injury. Patient has no history of prior injury, or surgery. Patient has concerns about going back to work as she has to walk 3 flights of stairs.    Occupation: teacher for [employer]    The patient has the following co-morbidities: n/a       Ankle Surgical History:  None      Past Medical, Social and Family History:  Past Medical History:   Diagnosis Date    BMI 45.0-49.9, adult (HCC)     Herniated disc, cervical      Past Surgical History:   Procedure Laterality Date    BREAST SURGERY      reduction    TONSILLECTOMY       Allergies   Allergen Reactions    Erythromycin Rash     Current  Outpatient Medications on File Prior to Visit   Medication Sig Dispense Refill    cetirizine (ZyrTEC) 10 mg tablet Take 10 mg by mouth daily      cholecalciferol (VITAMIN D3) 1,000 units tablet Take 2,000 Units by mouth daily      citalopram (CeleXA) 40 mg tablet TAKE 1 TABLET (40 MG TOTAL) BY MOUTH IN THE MORNING 90 tablet 1    folic acid (FOLVITE) 1 mg tablet Take 1,200 mg by mouth daily      levothyroxine 125 mcg tablet TAKE 1 TABLET BY MOUTH EVERY DAY 90 tablet 2    metFORMIN (GLUCOPHAGE-XR) 500 mg 24 hr tablet TAKE 2 PILL IN THE MORNING AND 2 PILLS AT NIGHT. 360 tablet 1    vitamin B-12 (VITAMIN B-12) 1,000 mcg tablet Take by mouth daily      Blood Glucose Monitoring Suppl (OneTouch Verio Flex System) w/Device KIT Dispense 1 kit per insurance formulary. Prediabetes. (Patient not taking: Reported on 7/25/2023) 1 kit 0    Blood Pressure Monitor JORGE LUIS Use in the morning (Patient not taking: Reported on 8/8/2023) 1 each 0    clomiPHENE (CLOMID) 50 mg tablet Take 2 tablets (100 mg total) by mouth daily for 5 days Start on cycle day 5 (Patient not taking: Reported on 1/2/2024) 10 tablet 0    OneTouch Delica Lancets 33G MISC Use 1 a day to test FBG daily as advised by Dr. Reddy. Prediabetes. (Patient not taking: Reported on 7/25/2023) 100 each 5    OneTouch Verio test strip Test Fasting blood sugar daily and test one postprandial blood sugar as advised by Dr. Reddy. Prediabetes. (Patient not taking: Reported on 7/25/2023) 100 strip 5     No current facility-administered medications on file prior to visit.     Social History     Socioeconomic History    Marital status: /Civil Union     Spouse name: Not on file    Number of children: Not on file    Years of education: Not on file    Highest education level: Not on file   Occupational History    Not on file   Tobacco Use    Smoking status: Never    Smokeless tobacco: Never   Vaping Use    Vaping status: Never Used   Substance and Sexual Activity    Alcohol use:  "Yes     Comment: soc    Drug use: No    Sexual activity: Yes     Partners: Male     Birth control/protection: None     Comment:    Other Topics Concern    Not on file   Social History Narrative    Not on file     Social Determinants of Health     Financial Resource Strain: Not on file   Food Insecurity: Not on file   Transportation Needs: Not on file   Physical Activity: Not on file   Stress: Not on file   Social Connections: Not on file   Intimate Partner Violence: Not on file   Housing Stability: Not on file         I have reviewed the past medical, surgical, social and family history, medications and allergies as documented in the EMR.    Review of systems: ROS is negative other than that noted in the HPI.  Psychiatric/Behavioral: Negative for agitation.     Physical Exam:    Blood pressure 137/83, pulse 88, height 5' 5.75\" (1.67 m), weight 128 kg (283 lb).    General/Constitutional: NAD, well developed, well nourished  HENT: Normocephalic, atraumatic  CV: Intact distal pulses, regular rate  Resp: No respiratory distress or labored breathing  GI: Soft and non-tender   Lymphatic: No lymphadenopathy palpated  Neuro: Alert and Oriented x 3, no focal deficits  Psych: Normal mood, normal affect, normal judgement, normal behavior  Skin: Warm, dry, no rashes, no erythema    Focused right Ankle Exam:  Skin is intact.  No ecchymosis, erythema or effusion noted on exam.    Diffuse tenderness over the medial, lateral malleolus, tibiotalar joint, deltoid ligament and ATFL, but no tenderness over the calcaneus, metatarsals, base of 5th metatarsal, CFL, midfoot, or fibular head.     Full range of motion of the ankle including full dorsiflexion, plantarflexion, inversion, and eversion. No crepitus noted. Pain with dorsiflexion and inversion.    No subluxation of the peroneal tendons or tenderness to palpation along the peroneal tendons    Strength testing demonstrates 5/5 with dorsiflexion, 5/5 plantarflexion of the " ankle, 5/5 with ankle inversion, 5/5 with eversion, and 5/5 with extension of the 1st toe.    Provocation testing demonstrates:  Syndesmosis- negative squeeze test, negative external rotation stress test  Ligaments- negative anterior drawer, negative tilt  Achilles- when squeezing the calf, there is plantarflexion at the ankle, no palpable gap appreciated over the posterior heel  Fracture- negative metatarsal squeeze test    No pain with palpation or range of motion of midfoot and forefoot bilaterally    Range of motion testing of the hip and knee are within normal limits.    No limp upon gait exam    No calf tenderness to palpation bilaterally    LE NV Exam: +2 DP/PT pulses bilaterally  Sensation intact to light touch L2-S1 bilaterally, SPN/DPN/TA motor intact      Ankle Imaging:    X-rays of the right ankle were obtained 12/24/23 and reviewed with the patient.  Based on my independent review, imaging shows no acute osseous abnormality.      Scribe Attestation      I,:  Edith Carroll PA-C am acting as a scribe while in the presence of the attending physician.:       I,:  Zack Sood MD personally performed the services described in this documentation    as scribed in my presence.:

## 2024-01-02 NOTE — LETTER
January 2, 2024     Patient: Estefany Rangel  YOB: 1985  Date of Visit: 1/2/2024      To Whom it May Concern:    Estefany Rangel is under my professional care. Estefany was seen in my office on 1/2/2024. Estefany may return to work with limitations : no stairs and must be able to ambulate with a boot or brace until seen in reevaluation in 4-6 weeks at which time work restrictions will be reassessed .    If you have any questions or concerns, please don't hesitate to call.         Sincerely,          Zack Sood MD        CC: No Recipients

## 2024-01-05 ENCOUNTER — ANNUAL EXAM (OUTPATIENT)
Dept: OBGYN CLINIC | Facility: MEDICAL CENTER | Age: 39
End: 2024-01-05
Payer: COMMERCIAL

## 2024-01-05 VITALS
WEIGHT: 293 LBS | BODY MASS INDEX: 47.09 KG/M2 | DIASTOLIC BLOOD PRESSURE: 70 MMHG | HEIGHT: 66 IN | SYSTOLIC BLOOD PRESSURE: 128 MMHG

## 2024-01-05 DIAGNOSIS — Z01.419 WELL WOMAN EXAM WITH ROUTINE GYNECOLOGICAL EXAM: Primary | ICD-10-CM

## 2024-01-05 PROBLEM — N91.2 AMENORRHEA: Status: RESOLVED | Noted: 2021-12-19 | Resolved: 2024-01-05

## 2024-01-05 PROBLEM — Z31.69 ENCOUNTER FOR PRECONCEPTION CONSULTATION: Status: RESOLVED | Noted: 2022-05-05 | Resolved: 2024-01-05

## 2024-01-05 PROCEDURE — S0612 ANNUAL GYNECOLOGICAL EXAMINA: HCPCS | Performed by: NURSE PRACTITIONER

## 2024-01-05 NOTE — PROGRESS NOTES
"Subjective      Estefany Rangel is a 38 y.o. female who presents for annual well woman exam.  Last Pap smear 21 NILM/ HR HPV(-). Periods are irregular every 1-4 months lasting 4 days.  Known to have PCOS.  Has met with [facility] for fertility induction.  Has tried IUI, Clomid.  Was recommended IVF.  Is considering weight loss injections.  Meeting with PCP soon.  No intermenstrual bleeding, spotting, or discharge.      Current contraception: none  History of abnormal Pap smear: no  Family history of uterine or ovarian cancer: yes - M. Grandmother   Regular self breast exam: no  History of abnormal mammogram: Mammograms to begin at age 40 unless otherwise clinically indicated  Family history of breast cancer: no  History of abnormal lipids: no  Gardasil vaccine: Completed series    Menstrual History:  OB History          0    Para   0    Term   0       0    AB   0    Living   0         SAB   0    IAB   0    Ectopic   0    Multiple   0    Live Births   0                Menarche age: 12.5  No LMP recorded. (Menstrual status: Amenorrheic other).  Period Cycle (Days):  (1-3 months ; was on clomid)  Period Duration (Days): 4  Period Pattern: (!) Irregular (PCOS)  Menstrual Flow: Heavy, Light, Moderate (2-3 heavy)  Menstrual Control: Maxi pad, Tampon  Menstrual Control Change Freq (Hours): 2hrs  Dysmenorrhea: (!) Mild  Dysmenorrhea Symptoms: Cramping    The following portions of the patient's history were reviewed and updated as appropriate: allergies, current medications, past family history, past medical history, past social history, past surgical history, and problem list.    Review of Systems  Pertinent items are noted in HPI.      Objective      /70   Ht 5' 5.75\" (1.67 m)   Wt 133 kg (293 lb 3.2 oz)   BMI 47.68 kg/m²     General:   alert and oriented, in no acute distress, alert, morbidly obese, appears stated age, and cooperative   Heart: regular rate and rhythm, S1, S2 normal, no murmur, " click, rub or gallop   Lungs: clear to auscultation bilaterally   Abdomen: soft, non-tender, without masses or organomegaly   Vulva: normal, Bartholin's, Urethra, Le Roy's normal   Vagina: normal mucosa, normal discharge, no palpable nodules   Cervix: no cervical motion tenderness and no lesions   Uterus: non-tender, difficult to assess d/t body habitus    Adnexa: non-tender, difficult to assess d/t body habitus    Breast: breasts appear normal, no suspicious masses, no skin or nipple changes or axillary nodes.  Bilateral anchor scars noted.  Well-healed             Assessment      @well woman  no contraindication to begin hormonal therapy@ .     Plan      All questions answered.  Breast self exam technique reviewed and patient encouraged to perform self-exam monthly.  Diagnosis explained in detail, including differential.  Dietary diary.  Discussed healthy lifestyle modifications.  Educational material distributed.  Follow up in 1 year for annual exam.  Follow up as needed.  Reviewed breast awareness   Patient's ultimate goal is pregnancy.  Is considering weight loss injections.  Reviewed option with PCOS to place on OCPs to help regulate cycles while receiving injections.  Reviewed with patient not to get pregnant during use of weight loss medication.  Patient verbalized understanding will call office when she discusses with PCP.  Reviewed withdrawal bleed if no menses in greater than 90 days.  Reviewed endometrial protection.  Encouraged healthy diet, exercise and lifestyle  Encouraged follow-up with PCP as needed  Encouraged seasonal influenza vaccination  Will call/GetSnippyhart message with results  VBI-    BMI Counseling: Body mass index is 47.68 kg/m². The BMI is above normal. Nutrition recommendations include 3-5 servings of fruits/vegetables daily. Exercise recommendations include exercising 3-5 times per week.

## 2024-01-11 ENCOUNTER — EVALUATION (OUTPATIENT)
Dept: PHYSICAL THERAPY | Facility: REHABILITATION | Age: 39
End: 2024-01-11
Payer: COMMERCIAL

## 2024-01-11 DIAGNOSIS — S93.402S SPRAIN OF UNSPECIFIED LIGAMENT OF LEFT ANKLE, SEQUELA: Primary | ICD-10-CM

## 2024-01-11 PROCEDURE — 97162 PT EVAL MOD COMPLEX 30 MIN: CPT | Performed by: PHYSICAL THERAPIST

## 2024-01-11 PROCEDURE — 97110 THERAPEUTIC EXERCISES: CPT | Performed by: PHYSICAL THERAPIST

## 2024-01-11 PROCEDURE — 97535 SELF CARE MNGMENT TRAINING: CPT | Performed by: PHYSICAL THERAPIST

## 2024-01-11 NOTE — PROGRESS NOTES
PT Evaluation     Today's date: 2024  Patient name: Estefany Rangel  : 1985  MRN: [id number]  Referring provider: Edith Carroll PA-C  Dx:   Encounter Diagnosis     ICD-10-CM    1. Sprain of unspecified ligament of left ankle, sequela  S93.402S Ambulatory Referral to Physical Therapy                     Assessment  Assessment details: Pt is a pleasant 38 y.o. female presenting to outpatient physical therapy with Sprain of unspecified ligament of left ankle, sequela  (primary encounter diagnosis) . Pt presents with pain, decreased range of motion, decreased strength, abnormal gait mechanics, impaired static and dynamic balance, as well as decreased tolerance to activity. Pt is a good candidate for outpatient physical therapy and would benefit from skilled physical therapy to address limitations and to achieve goals. Thank you for this referral.   Impairments: abnormal coordination, abnormal gait, abnormal or restricted ROM, activity intolerance, impaired balance, impaired physical strength, pain with function and weight-bearing intolerance  Understanding of Dx/Px/POC: good   Prognosis: good    Goals  ST. Patient will be able to ambulate community distances without limitation or gait abnormalities in 4 weeks.   2. Patient will demonstrate 25% improvement in ROM in 4 weeks.  3. Patient will demonstrate 1/2 grade improvement in strength in 4 weeks.    LT. Patient will be able to perform IADLS without restriction or pain by discharge.  2. Patient will be independent in HEP by discharge.  3. Patient will be able to return to recreational/work duties without restriction or pain by discharge.      Plan  Patient would benefit from: PT eval and skilled PT  Planned modality interventions: cryotherapy and thermotherapy: hydrocollator packs  Planned therapy interventions: IADL retraining, body mechanics training, flexibility, functional ROM exercises, home exercise program, neuromuscular  re-education, manual therapy, postural training, strengthening, stretching, therapeutic activities, therapeutic exercise, joint mobilization, balance/weight bearing training and patient education  Frequency: 2x week  Duration in visits: 20  Duration in weeks: 12  Treatment plan discussed with: patient        Subjective Evaluation    History of Present Illness  Date of onset: 12/24/2023  Mechanism of injury: trauma  Mechanism of injury: Pt is a 38 year-old female presenting to PT approximately 2 weeks s/p right ankle sprain. Pt reports on 12/24/23 she slipped on steps and twisted her right ankle. Pt reports she went to ER, x-rays performed and negative for fracture. Pt issued crutches which she used for 2 days. Pt reports she went to ortho on 1/2/24 and was issued CAM boot, which she wears during the day while at work. Pt referred to OPPT.    Pain Location: right ankle    Pain Type: aching    Pain Intensity:  Current: 1  Best: 1  Worst: 7      Pt reports increased pain and/or difficulty with: extended standing and walking, descending stairs,     Pt reports decreased pain with: OTC medication, ice, elevation, rest            Not a recurrent problem   Quality of life: good    Patient Goals  Patient goals for therapy: increased strength, independence with ADLs/IADLs, decreased pain, decreased edema, increased motion and improved balance      Diagnostic Tests  X-ray: normal        Objective     Observations     Right Ankle/Foot   Positive for edema.     Neurological Testing     Sensation     Ankle/Foot   Left Ankle/Foot   Intact: light touch    Right Ankle/Foot   Intact: light touch     Active Range of Motion   Left Ankle/Foot   Dorsiflexion (ke): 8 degrees with pain  Plantar flexion: 45 degrees with pain  Inversion: 20 degrees with pain  Eversion: 10 degrees with pain    Right Ankle/Foot   Dorsiflexion (ke): 4 degrees with pain  Plantar flexion: 28 degrees with pain  Inversion: 12 degrees with pain  Eversion: 6 degrees  "with pain    Strength/Myotome Testing     Left Ankle/Foot   Normal strength    Right Ankle/Foot   Dorsiflexion: 2-  Plantar flexion: 3-  Inversion: 2-  Eversion: 2-    Tests     Right Ankle/Foot   Negative for anterior drawer and Tapia.     Swelling   Left Ankle/Foot   Figure 8: 56 cm  Malleoli: 27 cm    Right Ankle/Foot   Figure 8: 55 cm  Malleoli: 26 cm             Precautions:   Patient Active Problem List   Diagnosis    Anxiety    Hypothyroidism    Class 3 severe obesity due to excess calories without serious comorbidity with body mass index (BMI) of 45.0 to 49.9 in adult (HCC)    History of positive PPD    Cervical radiculopathy    Neck pain    Insulin resistance    Sprain of unspecified ligament of right ankle, initial encounter         Daily Treatment Diary      Assessment  1/11                     Eval/Reval  MD                     FOTO  41/69       **         **   Manuals    R Ankle PROM                        R Gastroc & Soleus Stretching                          Prescribed POC    Pt Education  MD                      HEP Issued/Updated  MD Carnes                        Gastroc Stretch - Towel  30\"x3                      Ankle AROM: PF/DF/IV/EV  5\"  1x10 ea                      Ankle Circles  1x10 ea                      BAPS Board AROM                        Ankle Isometrics: IV                       Seated HR/TR                                                Goal Oriented Functional Activity:                                                Modalities    CP + Elevation   R Ankle   Post-Tx                                    QR Code:      [facility] HEP:  Access Code: [email or web address]  URL: [email or web address]  Date: 01/11/2024  Prepared by: Belkys Albright    Exercises  - Long Sitting Calf Stretch with Strap  - 1-2 x daily - 3 reps - 30 hold  - Supine Ankle Dorsiflexion and Plantarflexion AROM  - 1-2 x daily - 10 reps - 5 hold  - Supine Ankle Inversion Eversion AROM  - 1-2 x " daily - 10 reps - 5 hold  - Supine Ankle Circles  - 1-2 x daily - 10 reps    Patient Education  - Leg Elevation for Swelling  - Ice  - Acute Ankle Sprain

## 2024-01-16 ENCOUNTER — APPOINTMENT (OUTPATIENT)
Dept: PHYSICAL THERAPY | Facility: REHABILITATION | Age: 39
End: 2024-01-16
Payer: COMMERCIAL

## 2024-01-22 ENCOUNTER — OFFICE VISIT (OUTPATIENT)
Dept: PHYSICAL THERAPY | Facility: REHABILITATION | Age: 39
End: 2024-01-22
Payer: COMMERCIAL

## 2024-01-22 DIAGNOSIS — S93.402S SPRAIN OF UNSPECIFIED LIGAMENT OF LEFT ANKLE, SEQUELA: Primary | ICD-10-CM

## 2024-01-22 PROCEDURE — 97140 MANUAL THERAPY 1/> REGIONS: CPT

## 2024-01-22 PROCEDURE — 97112 NEUROMUSCULAR REEDUCATION: CPT

## 2024-01-22 PROCEDURE — 97110 THERAPEUTIC EXERCISES: CPT

## 2024-01-22 NOTE — PROGRESS NOTES
"Daily Note     Today's date: 2024  Patient name: Estefany Rangel  : 1985  MRN: [id number]  Referring provider: Edith Carroll PA-C  Dx:   Encounter Diagnosis     ICD-10-CM    1. Sprain of unspecified ligament of left ankle, sequela  S93.402S                      Subjective:   Patient reports that her ankle is feeling about the same.  She notes that if she is sitting she is not having pain however stairs and twisting or turning is uncomfortable.  Patient reports that she has not been using cold packs at home because she doesn't like cold.       Objective: See treatment diary below      Assessment: Patient tolerated treatment well. Patient performed ex and received manual therapy as noted.  Introduced isometric in, seated HR/TR and BAPS board this visit with appropriate challenge noted.  Patient reported a mild increase in soreness post ex.  Encouraged compliance with HEP and consider cold pack use to help manage soreness.  Patient would benefit from continued PT intervention to address deficits and attain set goals.       Plan: Continue per plan of care.      Precautions:   Patient Active Problem List   Diagnosis    Anxiety    Hypothyroidism    Class 3 severe obesity due to excess calories without serious comorbidity with body mass index (BMI) of 45.0 to 49.9 in adult (HCC)    History of positive PPD    Cervical radiculopathy    Neck pain    Insulin resistance    Sprain of unspecified ligament of right ankle, initial encounter         Daily Treatment Diary      Assessment                     Eval/Reval  MD                     FOTO  41/69       **         **   Manuals    R Ankle PROM    CC                    R Gastroc & Soleus Stretching  CC                        Prescribed POC    Pt Education  MD                      HEP Issued/Updated  MD Carnes    x5' L1                    Gastroc Stretch - Towel  30\"x3  30\"x3                    Ankle AROM: PF/DF/IV/EV  5\"  1x10 ea  " "5\"  1x15 ea                    Ankle Circles  1x10 ea  1x15 ea                    BAPS Board AROM    L2  X15 A/P  IN/EV                    Ankle Isometrics: IV    5\"x10                   Seated HR/TR    2x10                                            Goal Oriented Functional Activity:                                                Modalities    CP + Elevation   R Ankle   Post-Tx    x10'                                QR Code:      [facility] HEP:  Access Code: [email or web address]  URL: [email or web address]  Date: 01/11/2024  Prepared by: Belkys Albright    Exercises  - Long Sitting Calf Stretch with Strap  - 1-2 x daily - 3 reps - 30 hold  - Supine Ankle Dorsiflexion and Plantarflexion AROM  - 1-2 x daily - 10 reps - 5 hold  - Supine Ankle Inversion Eversion AROM  - 1-2 x daily - 10 reps - 5 hold  - Supine Ankle Circles  - 1-2 x daily - 10 reps    Patient Education  - Leg Elevation for Swelling  - Ice  - Acute Ankle Sprain       "

## 2024-01-24 DIAGNOSIS — Z11.1 SCREENING FOR TUBERCULOSIS: Primary | ICD-10-CM

## 2024-01-29 ENCOUNTER — APPOINTMENT (OUTPATIENT)
Dept: LAB | Facility: HOSPITAL | Age: 39
End: 2024-01-29
Payer: COMMERCIAL

## 2024-01-29 DIAGNOSIS — Z11.1 SCREENING FOR TUBERCULOSIS: ICD-10-CM

## 2024-01-29 PROCEDURE — 86480 TB TEST CELL IMMUN MEASURE: CPT

## 2024-01-29 PROCEDURE — 36415 COLL VENOUS BLD VENIPUNCTURE: CPT

## 2024-01-30 LAB
GAMMA INTERFERON BACKGROUND BLD IA-ACNC: <0 IU/ML
M TB IFN-G BLD-IMP: NEGATIVE
M TB IFN-G CD4+ BCKGRND COR BLD-ACNC: 0 IU/ML
M TB IFN-G CD4+ BCKGRND COR BLD-ACNC: 0 IU/ML
MITOGEN IGNF BCKGRD COR BLD-ACNC: 10 IU/ML

## 2024-01-31 ENCOUNTER — OFFICE VISIT (OUTPATIENT)
Dept: FAMILY MEDICINE CLINIC | Facility: CLINIC | Age: 39
End: 2024-01-31

## 2024-01-31 VITALS
TEMPERATURE: 98 F | RESPIRATION RATE: 20 BRPM | SYSTOLIC BLOOD PRESSURE: 134 MMHG | HEIGHT: 65 IN | DIASTOLIC BLOOD PRESSURE: 86 MMHG | BODY MASS INDEX: 48.18 KG/M2 | HEART RATE: 90 BPM | WEIGHT: 289.2 LBS | OXYGEN SATURATION: 97 %

## 2024-01-31 DIAGNOSIS — R73.03 PREDIABETES: ICD-10-CM

## 2024-01-31 DIAGNOSIS — E88.819 INSULIN RESISTANCE: ICD-10-CM

## 2024-01-31 DIAGNOSIS — S93.401A SPRAIN OF UNSPECIFIED LIGAMENT OF RIGHT ANKLE, INITIAL ENCOUNTER: ICD-10-CM

## 2024-01-31 DIAGNOSIS — Z09 FOLLOW-UP EXAM: Primary | ICD-10-CM

## 2024-01-31 DIAGNOSIS — E03.8 OTHER SPECIFIED HYPOTHYROIDISM: ICD-10-CM

## 2024-01-31 DIAGNOSIS — F41.9 ANXIETY: ICD-10-CM

## 2024-01-31 LAB — SL AMB POCT HEMOGLOBIN AIC: 5.3 (ref ?–6.5)

## 2024-01-31 NOTE — ASSESSMENT & PLAN NOTE
>>ASSESSMENT AND PLAN FOR INSULIN RESISTANCE WRITTEN ON 1/31/2024  5:07 PM BY FLORENTINO AHMADI    Current medication: Metformin 1000mg   01/24 Current A1C 5.3   08/23 Previous A1C 6.0

## 2024-01-31 NOTE — ASSESSMENT & PLAN NOTE
Presented to ER on 12/24/23 complaining of right foot, ankle, and lower leg pain after she slipped and fell after missing the last step.   X-rays Right foot:  no acute osseous abnormalities   Orthopedics 01/02/24   Meloxicam, cam boot, and PT referral   PT   Completed multiple sessions with some relief of symptoms   Follow up with Orthopedics scheduled

## 2024-01-31 NOTE — LETTER
January 31, 2024     Patient: Estefany Rangel  YOB: 1985  Date of Visit: 1/31/2024      To Whom it May Concern:    Estefany Rangel is under my professional care. Estefany was seen in my office on 1/31/2024. She has been evaluated by me for her chronic conditions. I have personally reviewed her test results and she is negative for Tuberculosis via a quantiferon TB Gold test.     If you have any questions or concerns, please don't hesitate to call.         Sincerely,          FLORENTINO Wild        CC: No Recipients

## 2024-01-31 NOTE — PROGRESS NOTES
Name: Estefany Rangel      : 1985      MRN: [id number]  Encounter Provider: FLORENTINO Wild  Encounter Date: 2024   Encounter department: [facility]    Assessment & Plan     1. Follow-up exam    2. Prediabetes  Assessment & Plan:  >>ASSESSMENT AND PLAN FOR INSULIN RESISTANCE WRITTEN ON 2024  5:07 PM BY FLORENTINO WILD    Current medication: Metformin 1000mg    Current A1C 5.3    Previous A1C 6.0    Orders:  -     POCT hemoglobin A1c    3. Insulin resistance  -     Semaglutide-Weight Management (WEGOVY) 0.25 MG/0.5ML; Inject 0.5 mL (0.25 mg total) under the skin once a week for 28 days    4. Other specified hypothyroidism  Assessment & Plan:   TSH 2.865  Current medication: Levothyroxine 125mcg   Continue current medication       5. Anxiety  Assessment & Plan:  Current medication: Citalopram 40mg       6. Sprain of unspecified ligament of right ankle, initial encounter  Assessment & Plan:  Presented to ER on 23 complaining of right foot, ankle, and lower leg pain after she slipped and fell after missing the last step.   X-rays Right foot:  no acute osseous abnormalities   Orthopedics 24   Meloxicam, cam boot, and PT referral   PT   Completed multiple sessions with some relief of symptoms   Follow up with Orthopedics scheduled      7. BMI 45.0-49.9, adult (HCC)  -     Semaglutide-Weight Management (WEGOVY) 0.25 MG/0.5ML; Inject 0.5 mL (0.25 mg total) under the skin once a week for 28 days        Depression Screening and Follow-up Plan: Patient was screened for depression during today's encounter. They screened negative with a PHQ-2 score of 0.        Subjective      Follow up exam on chronic conditions. Interested in weight loss medications.       Review of Systems   Constitutional:  Negative for activity change, chills, fatigue and fever.   HENT:  Negative for congestion, ear pain, rhinorrhea, sore throat and trouble swallowing.   "  Eyes:  Negative for pain and visual disturbance.   Respiratory:  Negative for cough, chest tightness and shortness of breath.    Cardiovascular:  Negative for chest pain, palpitations and leg swelling.   Gastrointestinal:  Negative for abdominal pain, constipation, diarrhea, nausea and vomiting.   Genitourinary:  Negative for difficulty urinating, dysuria, hematuria and urgency.   Musculoskeletal:  Negative for arthralgias and back pain.   Skin:  Negative for color change and rash.   Neurological:  Negative for dizziness, seizures, syncope and headaches.   Psychiatric/Behavioral:  Negative for dysphoric mood. The patient is not nervous/anxious.    All other systems reviewed and are negative.      Current Outpatient Medications on File Prior to Visit   Medication Sig    cetirizine (ZyrTEC) 10 mg tablet Take 10 mg by mouth daily    cholecalciferol (VITAMIN D3) 1,000 units tablet Take 2,000 Units by mouth daily    citalopram (CeleXA) 40 mg tablet TAKE 1 TABLET (40 MG TOTAL) BY MOUTH IN THE MORNING    Coenzyme Q10 (CoQ10) 400 MG CAPS     folic acid (FOLVITE) 1 mg tablet Take 1,200 mg by mouth daily    levothyroxine 125 mcg tablet TAKE 1 TABLET BY MOUTH EVERY DAY    PONCHO ROOT PO Take by mouth 1900 mg   bid    metFORMIN (GLUCOPHAGE-XR) 500 mg 24 hr tablet TAKE 2 PILL IN THE MORNING AND 2 PILLS AT NIGHT.    vitamin B-12 (VITAMIN B-12) 1,000 mcg tablet Take by mouth daily    meloxicam (Mobic) 15 mg tablet Take 1 tablet (15 mg total) by mouth daily (Patient not taking: Reported on 1/31/2024)       Objective     /86 (BP Location: Left arm, Patient Position: Sitting, Cuff Size: Large)   Pulse 90   Temp 98 °F (36.7 °C) (Temporal)   Resp 20   Ht 5' 5\" (1.651 m)   Wt 131 kg (289 lb 3.2 oz)   SpO2 97%   BMI 48.13 kg/m²     Physical Exam  Vitals and nursing note reviewed.   Constitutional:       Appearance: Normal appearance. She is normal weight.   HENT:      Head: Normocephalic.      Right Ear: Tympanic membrane, " ear canal and external ear normal. There is no impacted cerumen.      Left Ear: Tympanic membrane, ear canal and external ear normal. There is no impacted cerumen.      Nose: Nose normal.      Mouth/Throat:      Mouth: Mucous membranes are moist.      Pharynx: Oropharynx is clear.   Eyes:      Extraocular Movements: Extraocular movements intact.      Conjunctiva/sclera: Conjunctivae normal.      Pupils: Pupils are equal, round, and reactive to light.   Cardiovascular:      Rate and Rhythm: Normal rate and regular rhythm.      Pulses: Normal pulses.      Heart sounds: Normal heart sounds.   Pulmonary:      Effort: Pulmonary effort is normal.      Breath sounds: Normal breath sounds.   Abdominal:      General: Bowel sounds are normal. There is no distension.      Palpations: Abdomen is soft.      Tenderness: There is no abdominal tenderness.   Musculoskeletal:         General: Normal range of motion.      Cervical back: Normal range of motion.   Skin:     General: Skin is warm.      Capillary Refill: Capillary refill takes less than 2 seconds.   Neurological:      General: No focal deficit present.      Mental Status: She is alert and oriented to person, place, and time. Mental status is at baseline.   Psychiatric:         Mood and Affect: Mood normal.         Behavior: Behavior normal.         Thought Content: Thought content normal.         Judgment: Judgment normal.       FLORENTINO Wild

## 2024-02-05 ENCOUNTER — OFFICE VISIT (OUTPATIENT)
Dept: OBGYN CLINIC | Facility: CLINIC | Age: 39
End: 2024-02-05
Payer: COMMERCIAL

## 2024-02-05 VITALS
BODY MASS INDEX: 46.65 KG/M2 | HEIGHT: 65 IN | WEIGHT: 280 LBS | DIASTOLIC BLOOD PRESSURE: 80 MMHG | SYSTOLIC BLOOD PRESSURE: 130 MMHG | HEART RATE: 80 BPM

## 2024-02-05 DIAGNOSIS — S93.401A SPRAIN OF UNSPECIFIED LIGAMENT OF RIGHT ANKLE, INITIAL ENCOUNTER: Primary | ICD-10-CM

## 2024-02-05 PROCEDURE — 99213 OFFICE O/P EST LOW 20 MIN: CPT | Performed by: STUDENT IN AN ORGANIZED HEALTH CARE EDUCATION/TRAINING PROGRAM

## 2024-02-05 NOTE — LETTER
February 5, 2024     Patient: Estefany Rangel  YOB: 1985  Date of Visit: 2/5/2024      To Whom it May Concern:    Estefany Rangel is under my professional care. Estefany was seen in my office on 2/5/2024. Estefany may return to work with limitations including stairs, as tolerated .    If you have any questions or concerns, please don't hesitate to call.         Sincerely,          Zack Sood MD        CC: No Recipients

## 2024-02-05 NOTE — PROGRESS NOTES
Ortho Sports Medicine New Patient Ankle Visit    Assesment:  38 y.o. female with right ankle pain following a possible hyperextension injury.  Exam consistent with a right ankle sprain.    Plan:  I reviewed the history and exam with the patient in clinic today.  Patient has completed physical therapy and weaned out of the cam boot but continues to have pain and intermittent swelling, particularly after increased activities.  I discussed that at this point I recommended trying a lace up ankle brace for additional support of the ankle particularly with increases in activities.  I discussed that she should continue to ice and elevate the ankle to help with swelling control.  The patient asked about using stairs at work.  I discussed that she could try with the ankle brace and see how her ankle response and if she is having pain I would hold off on using the stairs pending improvement in her symptoms.  I recommend follow-up in 6 weeks.  I discussed that if her symptoms continue at that point we could get an MRI.  Patient demonstrated understanding discussion was agreed with plan.  All of her questions were answered.  I provided her with a lace up ankle brace in clinic today.  I will see her back in 6 weeks.  She can reach out the clinic with any questions or concerns at any time.    Conservative treatment:  Ice to ankle for 20 minutes at least 1-2 times daily.  Continue HEP for lower leg and ankle strengthening and proprioception.   She may progress to stairs as tolerated at work, note provided.  Ankle brace provided in office.  Follow-up in 6 weeks.     Imaging:  No new imaging at time of visit.     Injection:  No Injection planned at this time.    Surgery:  No surgery is recommended at this point, continue with conservative treatment plan as noted.    Follow up:  Return in about 6 weeks (around 3/18/2024) for Recheck.      Chief Complaint   Patient presents with    Right Ankle - Follow-up, Pain       History of  "Present Illness:  The patient is a 38 y.o. female seen in clinic for right ankle pain. The patient sustained an fall which resulted in an ankle sprain approximately 6 weeks ago, on 12/24/23. The patient was last seen in office on 1/2/24 where she was provided a walking boot and a prescription for physical therapy. She states that she has been completing a home exercise program that was provided by her therapist as well. She states that she has discontinued the walking boot and wears the air cast with increased activity. On today's presentation, she reports continued pain. The patient states that she thought she would have progressed quicker. She states that her ankle was \"throbbing\" yesterday as she has been packing and lifting heavy items while preparing to move.      Occupation: teacher for [employer].    The patient has the following co-morbidities: n/a       Ankle Surgical History:  None      Past Medical, Social and Family History:  Past Medical History:   Diagnosis Date    BMI 45.0-49.9, adult (HCC)     Herniated disc, cervical     Hypertension      Past Surgical History:   Procedure Laterality Date    BREAST SURGERY      reduction    TONSILLECTOMY       Allergies   Allergen Reactions    Erythromycin Rash     Current Outpatient Medications on File Prior to Visit   Medication Sig Dispense Refill    cetirizine (ZyrTEC) 10 mg tablet Take 10 mg by mouth daily      cholecalciferol (VITAMIN D3) 1,000 units tablet Take 2,000 Units by mouth daily      citalopram (CeleXA) 40 mg tablet TAKE 1 TABLET (40 MG TOTAL) BY MOUTH IN THE MORNING 90 tablet 1    Coenzyme Q10 (CoQ10) 400 MG CAPS       folic acid (FOLVITE) 1 mg tablet Take 1,200 mg by mouth daily      levothyroxine 125 mcg tablet TAKE 1 TABLET BY MOUTH EVERY DAY 90 tablet 2    PONCHO ROOT PO Take by mouth 1900 mg   bid      metFORMIN (GLUCOPHAGE-XR) 500 mg 24 hr tablet TAKE 2 PILL IN THE MORNING AND 2 PILLS AT NIGHT. 360 tablet 1    Semaglutide-Weight Management (WEGOVY) " "0.25 MG/0.5ML Inject 0.5 mL (0.25 mg total) under the skin once a week for 28 days 2 mL 0    vitamin B-12 (VITAMIN B-12) 1,000 mcg tablet Take by mouth daily      meloxicam (Mobic) 15 mg tablet Take 1 tablet (15 mg total) by mouth daily (Patient not taking: Reported on 1/31/2024) 30 tablet 0     No current facility-administered medications on file prior to visit.     Social History     Socioeconomic History    Marital status: /Civil Union     Spouse name: Not on file    Number of children: Not on file    Years of education: Not on file    Highest education level: Not on file   Occupational History    Not on file   Tobacco Use    Smoking status: Never    Smokeless tobacco: Never    Tobacco comments:     None   Vaping Use    Vaping status: Never Used   Substance and Sexual Activity    Alcohol use: Yes     Comment: social drinker (1-2 drinks per month)    Drug use: No    Sexual activity: Yes     Partners: Male     Birth control/protection: None     Comment: Trying to get pregnant   Other Topics Concern    Not on file   Social History Narrative    Not on file     Social Determinants of Health     Financial Resource Strain: Not on file   Food Insecurity: Not on file   Transportation Needs: Not on file   Physical Activity: Not on file   Stress: Not on file   Social Connections: Not on file   Intimate Partner Violence: Not on file   Housing Stability: Not on file       I have reviewed the past medical, surgical, social and family history, medications and allergies as documented in the EMR.    Review of systems: ROS is negative other than that noted in the HPI.  Psychiatric/Behavioral: Negative for agitation.     Physical Exam:    Blood pressure 130/80, pulse 80, height 5' 5\" (1.651 m), weight 127 kg (280 lb).    General/Constitutional: NAD, well developed, well nourished  HENT: Normocephalic, atraumatic  CV: Intact distal pulses, regular rate  Resp: No respiratory distress or labored breathing  GI: Soft and " non-tender   Lymphatic: No lymphadenopathy palpated  Neuro: Alert and Oriented x 3, no focal deficits  Psych: Normal mood, normal affect, normal judgement, normal behavior  Skin: Warm, dry, no rashes, no erythema    Focused right Ankle Exam:  Skin is intact.  No ecchymosis, erythema or effusion noted on exam.    Diffuse tenderness over the medial, lateral malleolus, tibiotalar joint, deltoid ligament and ATFL, but no tenderness over the calcaneus, metatarsals, base of 5th metatarsal, CFL, midfoot, or fibular head.     Full range of motion of the ankle including full dorsiflexion, plantarflexion, inversion, and eversion. No crepitus noted. Pain with dorsiflexion and inversion.    No subluxation of the peroneal tendons or tenderness to palpation along the peroneal tendons    Strength testing demonstrates 5/5 with dorsiflexion, 5/5 plantarflexion of the ankle, 5/5 with ankle inversion, 5/5 with eversion, and 5/5 with extension of the 1st toe.    Provocation testing demonstrates:  Syndesmosis- negative squeeze test, negative external rotation stress test  Ligaments- negative anterior drawer, negative tilt  Achilles- when squeezing the calf, there is plantarflexion at the ankle, no palpable gap appreciated over the posterior heel  Fracture- negative metatarsal squeeze test    No pain with palpation or range of motion of midfoot and forefoot bilaterally    Range of motion testing of the hip and knee are within normal limits.    No limp upon gait exam    No calf tenderness to palpation bilaterally    LE NV Exam: +2 DP/PT pulses bilaterally  Sensation intact to light touch L2-S1 bilaterally, SPN/DPN/TA motor intact      Ankle Imaging:    X-rays of the right ankle were obtained 12/24/23 and reviewed with the patient.  Based on my independent review, imaging shows no acute osseous abnormality.      Scribe Attestation      I,:  Demetra Lauren am acting as a scribe while in the presence of the attending physician.:        I,:  Zack Sood MD personally performed the services described in this documentation    as scribed in my presence.:

## 2024-03-08 DIAGNOSIS — R73.03 PREDIABETES: ICD-10-CM

## 2024-03-08 DIAGNOSIS — E88.819 INSULIN RESISTANCE: Primary | ICD-10-CM

## 2024-03-08 RX ORDER — TIRZEPATIDE 2.5 MG/.5ML
2.5 INJECTION, SOLUTION SUBCUTANEOUS WEEKLY
Qty: 2 ML | Refills: 0 | Status: SHIPPED | OUTPATIENT
Start: 2024-03-08 | End: 2024-04-05

## 2024-03-13 DIAGNOSIS — R53.83 OTHER FATIGUE: Primary | ICD-10-CM

## 2024-04-11 ENCOUNTER — TELEPHONE (OUTPATIENT)
Age: 39
End: 2024-04-11

## 2024-04-17 NOTE — TELEPHONE ENCOUNTER
PA for Zepbound    Submitted via    [x]CMM-KEY [id number]  []Surescripts-Case ID #   []Faxed to plan   []Other website   []Phone call Case ID #     Office notes sent, clinical questions answered. Awaiting determination    Turnaround time for your insurance to make a decision on your Prior Authorization can take 7-21 business days.

## 2024-04-18 DIAGNOSIS — E03.9 HYPOTHYROIDISM, UNSPECIFIED TYPE: ICD-10-CM

## 2024-04-18 NOTE — TELEPHONE ENCOUNTER
Reason for call:   [x] Refill   [] Prior Auth  [] Other:     Office:   [x] PCP/Provider - José Luis Sheikh  [] Specialty/Provider -     Medication: Levothyroxine     Dose/Frequency: 125 mcg Daily     Quantity: 90    [facility] [address]    Does the patient have enough for 3 days?   [] Yes   [] No - Send as HP to POD unknown pharmacy calling for refill

## 2024-04-19 RX ORDER — LEVOTHYROXINE SODIUM 0.12 MG/1
125 TABLET ORAL DAILY
Qty: 90 TABLET | Refills: 1 | Status: SHIPPED | OUTPATIENT
Start: 2024-04-19

## 2024-04-19 NOTE — TELEPHONE ENCOUNTER
PA for Zepbound Approved   Date(s) approved 2/19/24-11/18/24  Case #[id number]    Patient advised by [x] Recommendi Message                      [x] Phone call       Pharmacy advised by [x]Fax                                     []Phone call    Approval letter scanned into Media Yes

## 2024-04-22 NOTE — TELEPHONE ENCOUNTER
Patient called the RX Refill Line. Message is being forwarded to the office.     Patient is wondering if she is allowed to get a 90 day supply of this prescription.    Patient stated that at [facility] a 90 day supply is the same price as a 30 day supply, so she would rather do that than a 30 day. But if only a 30 is what he wants her to start with, she would rather use [facility] for the 30 supply since it is more cost effective for her.    Please contact patient with your decision so she knows where she is getting her prescription from.  Please contact patient at  [phone]

## 2024-04-23 DIAGNOSIS — F41.9 ANXIETY: ICD-10-CM

## 2024-04-24 RX ORDER — CITALOPRAM 40 MG/1
40 TABLET ORAL DAILY
Qty: 90 TABLET | Refills: 1 | Status: SHIPPED | OUTPATIENT
Start: 2024-04-24

## 2024-04-30 DIAGNOSIS — E88.819 INSULIN RESISTANCE: ICD-10-CM

## 2024-04-30 RX ORDER — TIRZEPATIDE 5 MG/.5ML
5 INJECTION, SOLUTION SUBCUTANEOUS WEEKLY
Qty: 2 ML | Refills: 0 | Status: SHIPPED | OUTPATIENT
Start: 2024-04-30 | End: 2024-04-30 | Stop reason: SDUPTHER

## 2024-04-30 RX ORDER — TIRZEPATIDE 5 MG/.5ML
5 INJECTION, SOLUTION SUBCUTANEOUS WEEKLY
Qty: 6 ML | Refills: 1 | Status: SHIPPED | OUTPATIENT
Start: 2024-04-30 | End: 2024-07-29

## 2024-04-30 RX ORDER — METFORMIN HYDROCHLORIDE 500 MG/1
TABLET, EXTENDED RELEASE ORAL
Qty: 360 TABLET | Refills: 1 | Status: SHIPPED | OUTPATIENT
Start: 2024-04-30

## 2024-04-30 RX ORDER — TIRZEPATIDE 2.5 MG/.5ML
INJECTION, SOLUTION SUBCUTANEOUS
COMMUNITY
Start: 2024-04-25 | End: 2024-04-30

## 2024-05-08 DIAGNOSIS — E03.9 HYPOTHYROIDISM, UNSPECIFIED TYPE: ICD-10-CM

## 2024-05-08 RX ORDER — LEVOTHYROXINE SODIUM 0.12 MG/1
125 TABLET ORAL DAILY
Qty: 90 TABLET | Refills: 1 | Status: SHIPPED | OUTPATIENT
Start: 2024-05-08

## 2024-05-09 ENCOUNTER — TELEPHONE (OUTPATIENT)
Age: 39
End: 2024-05-09

## 2024-05-09 NOTE — TELEPHONE ENCOUNTER
PA for Zepbound 5mg     Submitted via    [x]CMM-KEY [id number]  []Surescripts-Case ID #   []Faxed to plan   []Other website   []Phone call Case ID #     Office notes sent, clinical questions answered. Awaiting determination    Turnaround time for your insurance to make a decision on your Prior Authorization can take 7-21 business days.

## 2024-05-09 NOTE — TELEPHONE ENCOUNTER
PA for Zepbound Approved     Date(s) approved 05/04/24-05/16/2024    Case #    Patient advised by          [x] [employer] Authoriat Message  [x] Phone call   []LMOM  []L/M to call office as no active Communication consent on file  []Unable to leave detailed message as VM not approved on Communication consent       Pharmacy advised by    [x]Fax  []Phone call    Approval letter scanned into Media Yes

## 2024-05-20 DIAGNOSIS — E88.819 INSULIN RESISTANCE: ICD-10-CM

## 2024-05-22 RX ORDER — TIRZEPATIDE 5 MG/.5ML
5 INJECTION, SOLUTION SUBCUTANEOUS WEEKLY
Qty: 6 ML | Refills: 0 | Status: SHIPPED | OUTPATIENT
Start: 2024-05-22 | End: 2024-08-20

## 2024-06-05 ENCOUNTER — OFFICE VISIT (OUTPATIENT)
Dept: FAMILY MEDICINE CLINIC | Facility: CLINIC | Age: 39
End: 2024-06-05

## 2024-06-05 VITALS
OXYGEN SATURATION: 99 % | HEART RATE: 75 BPM | BODY MASS INDEX: 47.32 KG/M2 | TEMPERATURE: 98 F | WEIGHT: 284 LBS | SYSTOLIC BLOOD PRESSURE: 124 MMHG | HEIGHT: 65 IN | DIASTOLIC BLOOD PRESSURE: 86 MMHG

## 2024-06-05 DIAGNOSIS — J30.2 SEASONAL ALLERGIES: ICD-10-CM

## 2024-06-05 DIAGNOSIS — E66.01 CLASS 3 SEVERE OBESITY DUE TO EXCESS CALORIES WITHOUT SERIOUS COMORBIDITY WITH BODY MASS INDEX (BMI) OF 45.0 TO 49.9 IN ADULT (HCC): Primary | ICD-10-CM

## 2024-06-05 NOTE — ASSESSMENT & PLAN NOTE
01/24 Weight 289lbs   06/24 Current weight 284lbs   BMI 47.26    Current medication: Tirzepatide 5mg   New medication: Tirzepatide 7.5mg

## 2024-06-05 NOTE — PROGRESS NOTES
Ambulatory Visit  Name: Estefany Rangel      : 1985      MRN: [id number]  Encounter Provider: FLORENTINO Wild  Encounter Date: 2024   Encounter department: [facility]    Assessment & Plan   1. Class 3 severe obesity due to excess calories without serious comorbidity with body mass index (BMI) of 45.0 to 49.9 in adult ([facility])  Assessment & Plan:   Weight 289lbs    Current weight 284lbs   BMI 47.26    Current medication: Tirzepatide 5mg   New medication: Tirzepatide 7.5mg   Orders:  -     tirzepatide (Mounjaro) 7.5 MG/0.5ML; Inject 0.5 mL (7.5 mg total) under the skin every 7 days  2. Seasonal allergies  Assessment & Plan:  Exacerbated by dust       History of Present Illness     Follow up for weight loss. Recently started on zepbound. Doing well on current medications.         Review of Systems   Constitutional:  Negative for activity change, chills, fatigue and fever.   HENT:  Negative for congestion, ear pain, rhinorrhea, sore throat and trouble swallowing.    Eyes:  Negative for pain and visual disturbance.   Respiratory:  Negative for cough, chest tightness and shortness of breath.    Cardiovascular:  Negative for chest pain, palpitations and leg swelling.   Gastrointestinal:  Negative for abdominal pain, constipation, diarrhea, nausea and vomiting.   Genitourinary:  Negative for difficulty urinating, dysuria, hematuria and urgency.   Musculoskeletal:  Negative for arthralgias and back pain.   Skin:  Negative for color change and rash.   Neurological:  Negative for dizziness, seizures, syncope and headaches.   Psychiatric/Behavioral:  Negative for dysphoric mood. The patient is not nervous/anxious.    All other systems reviewed and are negative.    Medical History Reviewed by provider this encounter:  Tobacco  Allergies  Meds  Problems  Med Hx  Surg Hx  Fam Hx       Objective     /86 (BP Location: Left arm, Patient Position: Sitting, Cuff  "Size: Adult)   Pulse 75   Temp 98 °F (36.7 °C) (Temporal)   Ht 5' 5\" (1.651 m)   Wt 129 kg (284 lb)   SpO2 99%   BMI 47.26 kg/m²     Physical Exam  Vitals and nursing note reviewed.   Constitutional:       General: She is not in acute distress.     Appearance: She is well-developed. She is obese.   HENT:      Head: Normocephalic and atraumatic.      Right Ear: Tympanic membrane, ear canal and external ear normal.      Left Ear: Tympanic membrane, ear canal and external ear normal.      Nose: Nose normal.      Mouth/Throat:      Mouth: Mucous membranes are moist.      Pharynx: Oropharynx is clear.   Eyes:      Extraocular Movements: Extraocular movements intact.      Conjunctiva/sclera: Conjunctivae normal.      Pupils: Pupils are equal, round, and reactive to light.   Cardiovascular:      Rate and Rhythm: Normal rate and regular rhythm.      Pulses: Normal pulses.      Heart sounds: Normal heart sounds. No murmur heard.  Pulmonary:      Effort: Pulmonary effort is normal. No respiratory distress.      Breath sounds: Normal breath sounds.   Abdominal:      General: Bowel sounds are normal.      Palpations: Abdomen is soft.      Tenderness: There is no abdominal tenderness.   Musculoskeletal:         General: No swelling. Normal range of motion.      Cervical back: Normal range of motion and neck supple.   Skin:     General: Skin is warm and dry.      Capillary Refill: Capillary refill takes less than 2 seconds.   Neurological:      General: No focal deficit present.      Mental Status: She is alert and oriented to person, place, and time. Mental status is at baseline.   Psychiatric:         Mood and Affect: Mood normal.         Behavior: Behavior normal.         Thought Content: Thought content normal.         Judgment: Judgment normal.       Administrative Statements           "

## 2024-06-05 NOTE — LETTER
June 5, 2024     Patient: Estefany Rangel  YOB: 1985  Date of Visit: 6/5/2024      To Whom it May Concern:    Estefany Rangel is under my professional care. Estefany was seen in my office on 6/5/2024. Estefany has a past medical history of severe allergies from household dust. Please allow Estefany be in an environment that is amenable to her needs.  It is in the best interest of the patient's health if she has a room without carpet as the dust can lead to exacerbation of symptoms causing her to miss work due to the severity of her allergies.     If you have any questions or concerns, please don't hesitate to call.         Sincerely,          FLORENTINO Wild  Office visit on 06/05/2024 at 1600      CC: No Recipients

## 2024-06-17 ENCOUNTER — TELEPHONE (OUTPATIENT)
Age: 39
End: 2024-06-17

## 2024-06-17 NOTE — TELEPHONE ENCOUNTER
Mounjaro will not be covered with or without a prior authorization, Mounjaro is not FDA approved for obesity, prediabetes, etc. Mounajro is only FDA Approved for Type 2 Diabetes and will only be Approved via a Prior Authorization if patient has a diagnosis of Type 2 Diabetes.

## 2024-06-20 ENCOUNTER — PATIENT MESSAGE (OUTPATIENT)
Dept: FAMILY MEDICINE CLINIC | Facility: CLINIC | Age: 39
End: 2024-06-20

## 2024-06-20 DIAGNOSIS — E66.01 CLASS 3 SEVERE OBESITY DUE TO EXCESS CALORIES WITHOUT SERIOUS COMORBIDITY WITH BODY MASS INDEX (BMI) OF 45.0 TO 49.9 IN ADULT (HCC): ICD-10-CM

## 2024-06-24 RX ORDER — TIRZEPATIDE 7.5 MG/.5ML
7.5 INJECTION, SOLUTION SUBCUTANEOUS WEEKLY
Qty: 2 ML | Refills: 0 | Status: SHIPPED | OUTPATIENT
Start: 2024-06-24 | End: 2024-06-25 | Stop reason: SDUPTHER

## 2024-06-25 ENCOUNTER — TELEPHONE (OUTPATIENT)
Age: 39
End: 2024-06-25

## 2024-06-25 RX ORDER — TIRZEPATIDE 7.5 MG/.5ML
7.5 INJECTION, SOLUTION SUBCUTANEOUS WEEKLY
Qty: 2 ML | Refills: 0 | Status: SHIPPED | OUTPATIENT
Start: 2024-06-25

## 2024-06-26 ENCOUNTER — TELEPHONE (OUTPATIENT)
Age: 39
End: 2024-06-26

## 2024-06-26 NOTE — TELEPHONE ENCOUNTER
Pt called in to confirm which pharmacy Zepbound was sent to. She would also like to know if Mounjaro can be canceled from her list as she stated when she calls [facility] they are still showing the Mounjaro active. Please advise.

## 2024-07-02 RX ORDER — TIRZEPATIDE 10 MG/.5ML
10 INJECTION, SOLUTION SUBCUTANEOUS WEEKLY
Qty: 2 ML | Refills: 0 | Status: SHIPPED | OUTPATIENT
Start: 2024-09-24 | End: 2024-07-03 | Stop reason: SDUPTHER

## 2024-07-02 RX ORDER — TIRZEPATIDE 12.5 MG/.5ML
12.5 INJECTION, SOLUTION SUBCUTANEOUS WEEKLY
Qty: 2 ML | Refills: 0 | Status: SHIPPED | OUTPATIENT
Start: 2024-10-22 | End: 2024-07-03 | Stop reason: SDUPTHER

## 2024-07-02 RX ORDER — TIRZEPATIDE 15 MG/.5ML
15 INJECTION, SOLUTION SUBCUTANEOUS WEEKLY
Qty: 2 ML | Refills: 0 | Status: SHIPPED | OUTPATIENT
Start: 2024-11-19 | End: 2024-12-17

## 2024-07-16 ENCOUNTER — TELEPHONE (OUTPATIENT)
Age: 39
End: 2024-07-16

## 2024-07-16 ENCOUNTER — TELEMEDICINE (OUTPATIENT)
Dept: OBGYN CLINIC | Facility: MEDICAL CENTER | Age: 39
End: 2024-07-16
Payer: COMMERCIAL

## 2024-07-16 VITALS — BODY MASS INDEX: 47.26 KG/M2 | HEIGHT: 65 IN

## 2024-07-16 DIAGNOSIS — Z30.011 ENCOUNTER FOR INITIAL PRESCRIPTION OF CONTRACEPTIVE PILLS: Primary | ICD-10-CM

## 2024-07-16 PROCEDURE — 99213 OFFICE O/P EST LOW 20 MIN: CPT | Performed by: NURSE PRACTITIONER

## 2024-07-16 RX ORDER — DROSPIRENONE AND ETHINYL ESTRADIOL 0.03MG-3MG
1 KIT ORAL DAILY
Qty: 30 TABLET | Refills: 6 | Status: SHIPPED | OUTPATIENT
Start: 2024-07-16 | End: 2024-07-22 | Stop reason: SDUPTHER

## 2024-07-16 NOTE — PATIENT INSTRUCTIONS
Abstain from intercourse for the next 2 weeks.  If no menses and negative pregnancy test can order Provera

## 2024-07-16 NOTE — PROGRESS NOTES
Virtual Regular Visit  Name: Estefany Rangel      : 1985      MRN: [id number]  Encounter Provider: FLORENTINO Hoffman  Encounter Date: 2024   Encounter department: [facility]    Verification of patient location:    Patient is located at Home in the following state in which I hold an active license [place]    Assessment & Plan   1. Encounter for initial prescription of contraceptive pills  -Reviewed all forms of contraception including LARCs.  Patient is most interested in OCPs.  -Rx Melissa 1 tablet daily.  Reviewed with patient to start with the first days of next menses.  Should take the pill at the same time every day.  Common side effects including irregular bleeding, breast tenderness and nausea reviewed.  Aches reviewed.  Missed pill protocol reviewed.  Written information provided  -If no menses can order Provera.  Encouraged to abstain for at least 2 weeks and to take home pregnancy test.  Patient will reach out to office  -Signs and symptoms to report reviewed    RTO 2025 for annual exam or sooner as needed      Encounter provider FLORENTINO Hoffman    The patient was identified by name and date of birth. Estefany Rangel was informed that this is a telemedicine visit and that the visit is being conducted through the Epic Embedded platform. She agrees to proceed..  My office door was closed. Other methods to assure confidentiality were taken.   She acknowledged consent and understanding of privacy and security of the video platform. The patient has agreed to participate and understands they can discontinue the visit at any time.    Patient is aware this is a billable service.     History of Present Illness     Estefany Rangel is a 39 y.o. female who presents to discuss options for contraception.  LMP 24.  Menses are every 1 to 2 months lasting about 3 to 5 days.  Is interested in contraception while she is on weight loss medication.  Is only  "interested in oral options.  Is sexually active not using any form of contraception. Medical hsitory significant for anxiety, hypothyroid, pre-diabetes, BMI 47 and PCOS. Patient is a non-smoker.     Last pap smear 9/30/21 NILM/ HR HPV(-)  Completed gardasil vaccine series      Review of Systems   Constitutional:  Negative for chills, fatigue and fever.   Respiratory: Negative.     Cardiovascular: Negative.    Genitourinary:  Positive for menstrual problem. Negative for decreased urine volume, difficulty urinating, dyspareunia, dysuria, enuresis, flank pain, frequency, genital sores, hematuria, pelvic pain, urgency, vaginal bleeding, vaginal discharge and vaginal pain.       Objective     Ht 5' 5\" (1.651 m)   LMP 06/01/2024 (Exact Date)   BMI 47.26 kg/m²   Physical Exam  Constitutional:       General: She is not in acute distress.     Appearance: Normal appearance. She is obese. She is not ill-appearing.   Neurological:      Mental Status: She is alert and oriented to person, place, and time.   Psychiatric:         Mood and Affect: Mood normal.         Behavior: Behavior normal.         Visit Time  Total Visit Duration: 10        "

## 2024-07-16 NOTE — PROGRESS NOTES
Assessment/Plan:      Diagnoses and all orders for this visit:    Encounter for initial prescription of contraceptives, unspecified contraceptive      -Reviewed all forms of contraception including LARCs.  Patient is most interested in***.  Contraindication for Ortho Evra patches due to increased BMI  -***  -***  -***    RTO *** 1/2025 for annual exam     Subjective:     Patient ID: Estefany Rangel is a 39 y.o. female.    HPI P0 here to discuss options for contraception. Menarche *** LMP ***   Is sexually active using *** for contraception. Last IC ***   Medical hsitory significant for anxiety, hypothyroid, pre-diabetes, BMI 47 and PCOS. Patient is a non-smoker.     Last pap smear 9/30/21 NILM/ HR HPV(-)  Completed gardasil vaccine series      Review of Systems   Constitutional:  Negative for chills, fatigue and fever.   Respiratory: Negative.     Cardiovascular: Negative.          Objective:     Physical Exam  Constitutional:       Appearance: Normal appearance.   Neurological:      Mental Status: She is alert and oriented to person, place, and time.   Psychiatric:         Mood and Affect: Mood normal.         Behavior: Behavior normal.

## 2024-07-17 NOTE — TELEPHONE ENCOUNTER
Can you please advise what dose of Mounjaro the pt is currently on and if she  needs the PA done for the 7.5 MG thank you, if the PA is required for the 7.5 MG it will need to be resubmitted as the Rx is now closed.

## 2024-07-22 DIAGNOSIS — Z30.011 ENCOUNTER FOR INITIAL PRESCRIPTION OF CONTRACEPTIVE PILLS: ICD-10-CM

## 2024-07-22 RX ORDER — DROSPIRENONE AND ETHINYL ESTRADIOL 0.03MG-3MG
1 KIT ORAL DAILY
Qty: 30 TABLET | Refills: 6 | Status: SHIPPED | OUTPATIENT
Start: 2024-07-22

## 2024-07-30 ENCOUNTER — OFFICE VISIT (OUTPATIENT)
Dept: FAMILY MEDICINE CLINIC | Facility: CLINIC | Age: 39
End: 2024-07-30
Payer: COMMERCIAL

## 2024-07-30 ENCOUNTER — TELEPHONE (OUTPATIENT)
Age: 39
End: 2024-07-30

## 2024-07-30 VITALS
HEIGHT: 65 IN | WEIGHT: 270 LBS | DIASTOLIC BLOOD PRESSURE: 78 MMHG | OXYGEN SATURATION: 98 % | TEMPERATURE: 97.9 F | HEART RATE: 85 BPM | SYSTOLIC BLOOD PRESSURE: 120 MMHG | BODY MASS INDEX: 44.98 KG/M2
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
## 2024-07-30 DIAGNOSIS — Z13.6 SCREENING FOR CARDIOVASCULAR CONDITION: ICD-10-CM

## 2024-07-30 DIAGNOSIS — R73.03 PREDIABETES: ICD-10-CM

## 2024-07-30 DIAGNOSIS — J30.2 SEASONAL ALLERGIES: ICD-10-CM

## 2024-07-30 DIAGNOSIS — Z13.1 SCREENING FOR DIABETES MELLITUS: ICD-10-CM

## 2024-07-30 DIAGNOSIS — E66.01 CLASS 3 SEVERE OBESITY DUE TO EXCESS CALORIES WITHOUT SERIOUS COMORBIDITY WITH BODY MASS INDEX (BMI) OF 45.0 TO 49.9 IN ADULT (HCC): ICD-10-CM

## 2024-07-30 DIAGNOSIS — E03.8 OTHER SPECIFIED HYPOTHYROIDISM: ICD-10-CM

## 2024-07-30 DIAGNOSIS — Z00.00 ANNUAL PHYSICAL EXAM: Primary | ICD-10-CM

## 2024-07-30 DIAGNOSIS — F41.9 ANXIETY: ICD-10-CM

## 2024-07-30 DIAGNOSIS — L53.8: ICD-10-CM

## 2024-07-30 DIAGNOSIS — Z78.9 DIETING: ICD-10-CM

## 2024-07-30 PROCEDURE — 3725F SCREEN DEPRESSION PERFORMED: CPT

## 2024-07-30 PROCEDURE — 99395 PREV VISIT EST AGE 18-39: CPT

## 2024-07-30 RX ORDER — TIRZEPATIDE 15 MG/.5ML
15 INJECTION, SOLUTION SUBCUTANEOUS WEEKLY
Qty: 2 ML | Refills: 0 | Status: SHIPPED | OUTPATIENT
Start: 2024-07-30

## 2024-07-30 RX ORDER — TIRZEPATIDE 12.5 MG/.5ML
12.5 INJECTION, SOLUTION SUBCUTANEOUS WEEKLY
Qty: 2 ML | Refills: 0 | Status: SHIPPED | OUTPATIENT
Start: 2024-07-30

## 2024-07-30 RX ORDER — TIRZEPATIDE 10 MG/.5ML
10 INJECTION, SOLUTION SUBCUTANEOUS WEEKLY
Qty: 2 ML | Refills: 0 | Status: SHIPPED | OUTPATIENT
Start: 2024-07-30

## 2024-07-30 RX ORDER — TIRZEPATIDE 7.5 MG/.5ML
7.5 INJECTION, SOLUTION SUBCUTANEOUS WEEKLY
Qty: 2 ML | Refills: 0 | Status: SHIPPED | OUTPATIENT
Start: 2024-09-24 | End: 2024-07-30

## 2024-07-30 RX ORDER — TIRZEPATIDE 12.5 MG/.5ML
12.5 INJECTION, SOLUTION SUBCUTANEOUS WEEKLY
Qty: 2 ML | Refills: 0 | Status: SHIPPED | OUTPATIENT
Start: 2024-11-19 | End: 2024-07-30

## 2024-07-30 RX ORDER — TIRZEPATIDE 7.5 MG/.5ML
7.5 INJECTION, SOLUTION SUBCUTANEOUS WEEKLY
Qty: 2 ML | Refills: 2 | Status: CANCELLED | OUTPATIENT
Start: 2024-07-30

## 2024-07-30 RX ORDER — MOMETASONE FUROATE 1 MG/ML
SOLUTION TOPICAL DAILY
Qty: 30 ML | Refills: 0 | Status: SHIPPED | OUTPATIENT
Start: 2024-07-30

## 2024-07-30 RX ORDER — TIRZEPATIDE 15 MG/.5ML
15 INJECTION, SOLUTION SUBCUTANEOUS WEEKLY
Qty: 2 ML | Refills: 0 | Status: SHIPPED | OUTPATIENT
Start: 2024-12-17 | End: 2024-07-30

## 2024-07-30 RX ORDER — TIRZEPATIDE 10 MG/.5ML
10 INJECTION, SOLUTION SUBCUTANEOUS WEEKLY
Qty: 2 ML | Refills: 0 | Status: SHIPPED | OUTPATIENT
Start: 2024-10-22 | End: 2024-07-30

## 2024-07-30 NOTE — ASSESSMENT & PLAN NOTE
01/24 Weight 289lbs   06/24 Weight 284lbs   BMI 47.26    07/24 Weight 270lbs   BMI 44.93  Current medication: Tirzepatide 7.5mg     Actively working on diet and exercise

## 2024-07-30 NOTE — ASSESSMENT & PLAN NOTE
08/23 TSH 2.865  Repeat TSH ordered   Current medication: Levothyroxine 125mcg   Continue current medication

## 2024-07-30 NOTE — PROGRESS NOTES
Adult Annual Physical  Name: Estefany Rangel      : 1985      MRN: [id number]  Encounter Provider: FLORENTINO Wild  Encounter Date: 2024   Encounter department: [facility]    Assessment & Plan   1. Annual physical exam  2. Other specified hypothyroidism  Assessment & Plan:   TSH 2.865  Repeat TSH ordered   Current medication: Levothyroxine 125mcg   Continue current medication   Orders:  -     TSH, 3rd generation with Free T4 reflex; Future; Expected date: 2024  3. Anxiety  Assessment & Plan:  Current medication: Citalopram 40mg   Continue current medication regimen   4. Seasonal allergies  Assessment & Plan:  Exacerbated by dust  Current medications: Cetirizine 10mg   5. Prediabetes  Assessment & Plan:   Previous A1C 6.0   Current A1C 5.3     Orders:  -     Zepbound 7.5 MG/0.5ML auto-injector; Inject 0.5 mL (7.5 mg total) under the skin once a week for 28 days Do not start before 2024.  -     Zepbound 10 MG/0.5ML auto-injector; Inject 0.5 mL (10 mg total) under the skin once a week for 28 days Do not start before 2024.  -     Zepbound 12.5 MG/0.5ML auto-injector; Inject 0.5 mL (12.5 mg total) under the skin once a week for 28 days Do not start before 2024.  -     Zepbound 15 MG/0.5ML auto-injector; Inject 0.5 mL (15 mg total) under the skin once a week for 28 days Do not start before 2024.  6. BMI 45.0-49.9, adult (HCC)  -     Comprehensive metabolic panel; Future; Expected date: 2024  -     Zepbound 7.5 MG/0.5ML auto-injector; Inject 0.5 mL (7.5 mg total) under the skin once a week for 28 days Do not start before 2024.  -     Zepbound 10 MG/0.5ML auto-injector; Inject 0.5 mL (10 mg total) under the skin once a week for 28 days Do not start before 2024.  -     Zepbound 12.5 MG/0.5ML auto-injector; Inject 0.5 mL (12.5 mg total) under the skin once a week for 28  days Do not start before November 19, 2024.  -     Zepbound 15 MG/0.5ML auto-injector; Inject 0.5 mL (15 mg total) under the skin once a week for 28 days Do not start before December 17, 2024.  7. Class 3 severe obesity due to excess calories without serious comorbidity with body mass index (BMI) of 45.0 to 49.9 in adult ([facility])  Assessment & Plan:  01/24 Weight 289lbs   06/24 Weight 284lbs   BMI 47.26    07/24 Weight 270lbs   BMI 44.93  Current medication: Tirzepatide 7.5mg     Actively working on diet and exercise     Orders:  -     Zepbound 7.5 MG/0.5ML auto-injector; Inject 0.5 mL (7.5 mg total) under the skin once a week for 28 days Do not start before September 24, 2024.  -     Zepbound 10 MG/0.5ML auto-injector; Inject 0.5 mL (10 mg total) under the skin once a week for 28 days Do not start before October 22, 2024.  -     Zepbound 12.5 MG/0.5ML auto-injector; Inject 0.5 mL (12.5 mg total) under the skin once a week for 28 days Do not start before November 19, 2024.  -     Zepbound 15 MG/0.5ML auto-injector; Inject 0.5 mL (15 mg total) under the skin once a week for 28 days Do not start before December 17, 2024.  8. Screening for diabetes mellitus  -     Comprehensive metabolic panel; Future; Expected date: 07/30/2024  9. Screening for cardiovascular condition  -     Lipid panel; Future; Expected date: 07/30/2024  10. Otic erythema  -     mometasone (ELOCON) 0.1 % lotion; Apply topically daily  11. Exercise involving walking  -     Zepbound 7.5 MG/0.5ML auto-injector; Inject 0.5 mL (7.5 mg total) under the skin once a week for 28 days Do not start before September 24, 2024.  -     Zepbound 10 MG/0.5ML auto-injector; Inject 0.5 mL (10 mg total) under the skin once a week for 28 days Do not start before October 22, 2024.  -     Zepbound 12.5 MG/0.5ML auto-injector; Inject 0.5 mL (12.5 mg total) under the skin once a week for 28 days Do not start before November 19, 2024.  -     Zepbound 15 MG/0.5ML auto-injector;  Inject 0.5 mL (15 mg total) under the skin once a week for 28 days Do not start before December 17, 2024.  12. Dieting  -     Zepbound 7.5 MG/0.5ML auto-injector; Inject 0.5 mL (7.5 mg total) under the skin once a week for 28 days Do not start before September 24, 2024.  -     Zepbound 10 MG/0.5ML auto-injector; Inject 0.5 mL (10 mg total) under the skin once a week for 28 days Do not start before October 22, 2024.  -     Zepbound 12.5 MG/0.5ML auto-injector; Inject 0.5 mL (12.5 mg total) under the skin once a week for 28 days Do not start before November 19, 2024.  -     Zepbound 15 MG/0.5ML auto-injector; Inject 0.5 mL (15 mg total) under the skin once a week for 28 days Do not start before December 17, 2024.    Immunizations and preventive care screenings were discussed with patient today. Appropriate education was printed on patient's after visit summary.    Counseling:  Alcohol/drug use: discussed moderation in alcohol intake, the recommendations for healthy alcohol use, and avoidance of illicit drug use.  Dental Health: discussed importance of regular tooth brushing, flossing, and dental visits.  Injury prevention: discussed safety/seat belts, safety helmets, smoke detectors, carbon dioxide detectors, and smoking near bedding or upholstery.  Sexual health: discussed sexually transmitted diseases, partner selection, use of condoms, avoidance of unintended pregnancy, and contraceptive alternatives.  Exercise: the importance of regular exercise/physical activity was discussed. Recommend exercise 3-5 times per week for at least 30 minutes.          History of Present Illness     Adult Annual Physical:  Patient presents for annual physical.     Diet and Physical Activity:  - Diet/Nutrition: well balanced diet.  - Exercise: 5-7 times a week on average and 3-4 times a week on average.    Depression Screening:  - PHQ-2 Score: 0  - PHQ-9 Score: 0    General Health:  - Sleep: sleeps well.  - Hearing: normal hearing  "bilateral ears.  - Vision: no vision problems.  - Dental: regular dental visits and brushes teeth twice daily.    /GYN Health:  - Follows with GYN: yes.   - History of STDs: no    Advanced Care Planning:  - Has an advanced directive?: no    - Has a durable medical POA?: no    - ACP document given to patient?: no      Review of Systems   Constitutional:  Negative for activity change, chills, fatigue and fever.   HENT:  Negative for congestion, ear pain, rhinorrhea, sore throat and trouble swallowing.    Eyes:  Negative for pain and visual disturbance.   Respiratory:  Negative for cough, chest tightness and shortness of breath.    Cardiovascular:  Negative for chest pain, palpitations and leg swelling.   Gastrointestinal:  Negative for abdominal pain, constipation, diarrhea, nausea and vomiting.   Genitourinary:  Negative for difficulty urinating, dysuria, hematuria and urgency.   Musculoskeletal:  Negative for arthralgias and back pain.   Skin:  Negative for color change and rash.   Neurological:  Negative for dizziness, seizures, syncope and headaches.   Psychiatric/Behavioral:  Negative for dysphoric mood. The patient is not nervous/anxious.    All other systems reviewed and are negative.    Medical History Reviewed by provider this encounter:  Tobacco  Allergies  Meds  Problems  Med Hx  Surg Hx  Fam Hx         Objective     /78 (BP Location: Left arm, Patient Position: Sitting, Cuff Size: Adult)   Pulse 85   Temp 97.9 °F (36.6 °C) (Temporal)   Ht 5' 5\" (1.651 m)   Wt 122 kg (270 lb)   LMP 06/01/2024 (Exact Date)   SpO2 98%   BMI 44.93 kg/m²     Physical Exam  Vitals and nursing note reviewed.   Constitutional:       General: She is not in acute distress.     Appearance: Normal appearance. She is well-developed and normal weight.   HENT:      Head: Normocephalic and atraumatic.      Right Ear: Tympanic membrane, ear canal and external ear normal. There is no impacted cerumen.      Left Ear: " Tympanic membrane, ear canal and external ear normal. There is no impacted cerumen.      Nose: Nose normal.      Mouth/Throat:      Mouth: Mucous membranes are moist.      Pharynx: Oropharynx is clear.   Eyes:      Extraocular Movements: Extraocular movements intact.      Conjunctiva/sclera: Conjunctivae normal.      Pupils: Pupils are equal, round, and reactive to light.   Cardiovascular:      Rate and Rhythm: Normal rate and regular rhythm.      Pulses: Normal pulses.      Heart sounds: Normal heart sounds. No murmur heard.  Pulmonary:      Effort: Pulmonary effort is normal. No respiratory distress.      Breath sounds: Normal breath sounds.   Abdominal:      General: Bowel sounds are normal.      Palpations: Abdomen is soft.      Tenderness: There is no abdominal tenderness.   Musculoskeletal:         General: Normal range of motion.      Cervical back: Normal range of motion and neck supple.      Right lower leg: No edema.      Left lower leg: No edema.   Skin:     General: Skin is warm and dry.      Capillary Refill: Capillary refill takes less than 2 seconds.   Neurological:      General: No focal deficit present.      Mental Status: She is alert and oriented to person, place, and time. Mental status is at baseline.   Psychiatric:         Mood and Affect: Mood normal.         Behavior: Behavior normal.         Thought Content: Thought content normal.         Judgment: Judgment normal.       Administrative Statements   I have spent a total time of 40 minutes in caring for this patient on the day of the visit/encounter including Diagnostic results, Prognosis, Risks and benefits of tx options, Instructions for management, Patient and family education, Importance of tx compliance, Risk factor reductions, Impressions, Counseling / Coordination of care, Documenting in the medical record, Reviewing / ordering tests, medicine, procedures  , and Obtaining or reviewing history  .    Patient Instructions   Patient  "Education     Routine physical for adults   The Basics   Written by the doctors and editors at [facility]   What is a physical? -- A physical is a routine visit, or \"check-up,\" with your doctor. You might also hear it called a \"wellness visit\" or \"preventive visit.\"  During each visit, the doctor will:   Ask about your physical and mental health   Ask about your habits, behaviors, and lifestyle   Do an exam   Give you vaccines if needed   Talk to you about any medicines you take   Give advice about your health   Answer your questions  Getting regular check-ups is an important part of taking care of your health. It can help your doctor find and treat any problems you have. But it's also important for preventing health problems.  A routine physical is different from a \"sick visit.\" A sick visit is when you see a doctor because of a health concern or problem. Since physicals are scheduled ahead of time, you can think about what you want to ask the doctor.  How often should I get a physical? -- It depends on your age and health. In general, for people age 21 years and older:   If you are younger than 50 years, you might be able to get a physical every 3 years.   If you are 50 years or older, your doctor might recommend a physical every year.  If you have an ongoing health condition, like diabetes or high blood pressure, your doctor will probably want to see you more often.  What happens during a physical? -- In general, each visit will include:   Physical exam - The doctor or nurse will check your height, weight, heart rate, and blood pressure. They will also look at your eyes and ears. They will ask about how you are feeling and whether you have any symptoms that bother you.   Medicines - It's a good idea to bring a list of all the medicines you take to each doctor visit. Your doctor will talk to you about your medicines and answer any questions. Tell them if you are having any side effects that bother you. You should " "also tell them if you are having trouble paying for any of your medicines.   Habits and behaviors - This includes:   Your diet   Your exercise habits   Whether you smoke, drink alcohol, or use drugs   Whether you are sexually active   Whether you feel safe at home  Your doctor will talk to you about things you can do to improve your health and lower your risk of health problems. They will also offer help and support. For example, if you want to quit smoking, they can give you advice and might prescribe medicines. If you want to improve your diet or get more physical activity, they can help you with this, too.   Lab tests, if needed - The tests you get will depend on your age and situation. For example, your doctor might want to check your:   Cholesterol   Blood sugar   Iron level   Vaccines - The recommended vaccines will depend on your age, health, and what vaccines you already had. Vaccines are very important because they can prevent certain serious or deadly infections.   Discussion of screening - \"Screening\" means checking for diseases or other health problems before they cause symptoms. Your doctor can recommend screening based on your age, risk, and preferences. This might include tests to check for:   Cancer, such as breast, prostate, cervical, ovarian, colorectal, prostate, lung, or skin cancer   Sexually transmitted infections, such as chlamydia and gonorrhea   Mental health conditions like depression and anxiety  Your doctor will talk to you about the different types of screening tests. They can help you decide which screenings to have. They can also explain what the results might mean.   Answering questions - The physical is a good time to ask the doctor or nurse questions about your health. If needed, they can refer you to other doctors or specialists, too.  Adults older than 65 years often need other care, too. As you get older, your doctor will talk to you about:   How to prevent falling at home   " Hearing or vision tests   Memory testing   How to take your medicines safely   Making sure that you have the help and support you need at home  All topics are updated as new evidence becomes available and our peer review process is complete.  This topic retrieved from [employer] on: May 02, 2024.  Topic 002626 Version 1.0  Release: 32.4.3 - C32.122  © 2024 [employer], Inc. and/or its affiliates. All rights reserved.  Consumer Information Use and Disclaimer   Disclaimer: This generalized information is a limited summary of diagnosis, treatment, and/or medication information. It is not meant to be comprehensive and should be used as a tool to help the user understand and/or assess potential diagnostic and treatment options. It does NOT include all information about conditions, treatments, medications, side effects, or risks that may apply to a specific patient. It is not intended to be medical advice or a substitute for the medical advice, diagnosis, or treatment of a health care provider based on the health care provider's examination and assessment of a patient's specific and unique circumstances. Patients must speak with a health care provider for complete information about their health, medical questions, and treatment options, including any risks or benefits regarding use of medications. This information does not endorse any treatments or medications as safe, effective, or approved for treating a specific patient. Up[employer], Inc. and its affiliates disclaim any warranty or liability relating to this information or the use thereof.The use of this information is governed by the Terms of Use, available at https://www.woltersNuevolutionuwer.com/en/know/clinical-effectiveness-terms. 2024© UpToDate, Inc. and its affiliates and/or licensors. All rights reserved.  Copyright   © 2024 [employer], Inc. and/or its affiliates. All rights reserved.    Patient Education     Routine physical for adults   The Basics   Written by the doctors and  "editors at [employer]   What is a physical? -- A physical is a routine visit, or \"check-up,\" with your doctor. You might also hear it called a \"wellness visit\" or \"preventive visit.\"  During each visit, the doctor will:   Ask about your physical and mental health   Ask about your habits, behaviors, and lifestyle   Do an exam   Give you vaccines if needed   Talk to you about any medicines you take   Give advice about your health   Answer your questions  Getting regular check-ups is an important part of taking care of your health. It can help your doctor find and treat any problems you have. But it's also important for preventing health problems.  A routine physical is different from a \"sick visit.\" A sick visit is when you see a doctor because of a health concern or problem. Since physicals are scheduled ahead of time, you can think about what you want to ask the doctor.  How often should I get a physical? -- It depends on your age and health. In general, for people age 21 years and older:   If you are younger than 50 years, you might be able to get a physical every 3 years.   If you are 50 years or older, your doctor might recommend a physical every year.  If you have an ongoing health condition, like diabetes or high blood pressure, your doctor will probably want to see you more often.  What happens during a physical? -- In general, each visit will include:   Physical exam - The doctor or nurse will check your height, weight, heart rate, and blood pressure. They will also look at your eyes and ears. They will ask about how you are feeling and whether you have any symptoms that bother you.   Medicines - It's a good idea to bring a list of all the medicines you take to each doctor visit. Your doctor will talk to you about your medicines and answer any questions. Tell them if you are having any side effects that bother you. You should also tell them if you are having trouble paying for any of your medicines.   Habits " "and behaviors - This includes:   Your diet   Your exercise habits   Whether you smoke, drink alcohol, or use drugs   Whether you are sexually active   Whether you feel safe at home  Your doctor will talk to you about things you can do to improve your health and lower your risk of health problems. They will also offer help and support. For example, if you want to quit smoking, they can give you advice and might prescribe medicines. If you want to improve your diet or get more physical activity, they can help you with this, too.   Lab tests, if needed - The tests you get will depend on your age and situation. For example, your doctor might want to check your:   Cholesterol   Blood sugar   Iron level   Vaccines - The recommended vaccines will depend on your age, health, and what vaccines you already had. Vaccines are very important because they can prevent certain serious or deadly infections.   Discussion of screening - \"Screening\" means checking for diseases or other health problems before they cause symptoms. Your doctor can recommend screening based on your age, risk, and preferences. This might include tests to check for:   Cancer, such as breast, prostate, cervical, ovarian, colorectal, prostate, lung, or skin cancer   Sexually transmitted infections, such as chlamydia and gonorrhea   Mental health conditions like depression and anxiety  Your doctor will talk to you about the different types of screening tests. They can help you decide which screenings to have. They can also explain what the results might mean.   Answering questions - The physical is a good time to ask the doctor or nurse questions about your health. If needed, they can refer you to other doctors or specialists, too.  Adults older than 65 years often need other care, too. As you get older, your doctor will talk to you about:   How to prevent falling at home   Hearing or vision tests   Memory testing   How to take your medicines safely   Making " sure that you have the help and support you need at home  All topics are updated as new evidence becomes available and our peer review process is complete.  This topic retrieved from [employer] on: May 02, 2024.  Topic 269789 Version 1.0  Release: 32.4.3 - C32.122  © 2024 [employer], Inc. and/or its affiliates. All rights reserved.  Consumer Information Use and Disclaimer   Disclaimer: This generalized information is a limited summary of diagnosis, treatment, and/or medication information. It is not meant to be comprehensive and should be used as a tool to help the user understand and/or assess potential diagnostic and treatment options. It does NOT include all information about conditions, treatments, medications, side effects, or risks that may apply to a specific patient. It is not intended to be medical advice or a substitute for the medical advice, diagnosis, or treatment of a health care provider based on the health care provider's examination and assessment of a patient's specific and unique circumstances. Patients must speak with a health care provider for complete information about their health, medical questions, and treatment options, including any risks or benefits regarding use of medications. This information does not endorse any treatments or medications as safe, effective, or approved for treating a specific patient. Up[employer], Inc. and its affiliates disclaim any warranty or liability relating to this information or the use thereof.The use of this information is governed by the Terms of Use, available at https://www.woltersSkuServeuwer.com/en/know/clinical-effectiveness-terms. 2024© Up[employer]Date, Inc. and its affiliates and/or licensors. All rights reserved.  Copyright   © 2024 [employer]Date, Inc. and/or its affiliates. All rights reserved.

## 2024-07-30 NOTE — TELEPHONE ENCOUNTER
Patient insurance called in stating prior auth is needed for Zepbound 10mg which can be done on covermymeds.

## 2024-07-30 NOTE — PATIENT INSTRUCTIONS
"Patient Education     Routine physical for adults   The Basics   Written by the doctors and editors at [facility]   What is a physical? -- A physical is a routine visit, or \"check-up,\" with your doctor. You might also hear it called a \"wellness visit\" or \"preventive visit.\"  During each visit, the doctor will:   Ask about your physical and mental health   Ask about your habits, behaviors, and lifestyle   Do an exam   Give you vaccines if needed   Talk to you about any medicines you take   Give advice about your health   Answer your questions  Getting regular check-ups is an important part of taking care of your health. It can help your doctor find and treat any problems you have. But it's also important for preventing health problems.  A routine physical is different from a \"sick visit.\" A sick visit is when you see a doctor because of a health concern or problem. Since physicals are scheduled ahead of time, you can think about what you want to ask the doctor.  How often should I get a physical? -- It depends on your age and health. In general, for people age 21 years and older:   If you are younger than 50 years, you might be able to get a physical every 3 years.   If you are 50 years or older, your doctor might recommend a physical every year.  If you have an ongoing health condition, like diabetes or high blood pressure, your doctor will probably want to see you more often.  What happens during a physical? -- In general, each visit will include:   Physical exam - The doctor or nurse will check your height, weight, heart rate, and blood pressure. They will also look at your eyes and ears. They will ask about how you are feeling and whether you have any symptoms that bother you.   Medicines - It's a good idea to bring a list of all the medicines you take to each doctor visit. Your doctor will talk to you about your medicines and answer any questions. Tell them if you are having any side effects that bother you. You " "should also tell them if you are having trouble paying for any of your medicines.   Habits and behaviors - This includes:   Your diet   Your exercise habits   Whether you smoke, drink alcohol, or use drugs   Whether you are sexually active   Whether you feel safe at home  Your doctor will talk to you about things you can do to improve your health and lower your risk of health problems. They will also offer help and support. For example, if you want to quit smoking, they can give you advice and might prescribe medicines. If you want to improve your diet or get more physical activity, they can help you with this, too.   Lab tests, if needed - The tests you get will depend on your age and situation. For example, your doctor might want to check your:   Cholesterol   Blood sugar   Iron level   Vaccines - The recommended vaccines will depend on your age, health, and what vaccines you already had. Vaccines are very important because they can prevent certain serious or deadly infections.   Discussion of screening - \"Screening\" means checking for diseases or other health problems before they cause symptoms. Your doctor can recommend screening based on your age, risk, and preferences. This might include tests to check for:   Cancer, such as breast, prostate, cervical, ovarian, colorectal, prostate, lung, or skin cancer   Sexually transmitted infections, such as chlamydia and gonorrhea   Mental health conditions like depression and anxiety  Your doctor will talk to you about the different types of screening tests. They can help you decide which screenings to have. They can also explain what the results might mean.   Answering questions - The physical is a good time to ask the doctor or nurse questions about your health. If needed, they can refer you to other doctors or specialists, too.  Adults older than 65 years often need other care, too. As you get older, your doctor will talk to you about:   How to prevent falling at " home   Hearing or vision tests   Memory testing   How to take your medicines safely   Making sure that you have the help and support you need at home  All topics are updated as new evidence becomes available and our peer review process is complete.  This topic retrieved from [employer] on: May 02, 2024.  Topic 764217 Version 1.0  Release: 32.4.3 - C32.122  © 2024 UpTo[employer], Inc[employer] and/or its affiliates. All rights reserved.  Consumer Information Use and Disclaimer   Disclaimer: This generalized information is a limited summary of diagnosis, treatment, and/or medication information. It is not meant to be comprehensive and should be used as a tool to help the user understand and/or assess potential diagnostic and treatment options. It does NOT include all information about conditions, treatments, medications, side effects, or risks that may apply to a specific patient. It is not intended to be medical advice or a substitute for the medical advice, diagnosis, or treatment of a health care provider based on the health care provider's examination and assessment of a patient's specific and unique circumstances. Patients must speak with a health care provider for complete information about their health, medical questions, and treatment options, including any risks or benefits regarding use of medications. This information does not endorse any treatments or medications as safe, effective, or approved for treating a specific patient. Up[employer], Inc[employer] and its affiliates disclaim any warranty or liability relating to this information or the use thereof.The use of this information is governed by the Terms of Use, available at https://www.woltersLatindauwer.com/en/know/clinical-effectiveness-terms. 2024© Up[employer]Date, Inc. and its affiliates and/or licensors. All rights reserved.  Copyright   © 2024 Up[employer], Inc. and/or its affiliates. All rights reserved.    Patient Education     Routine physical for adults   The Basics   Written by the  "doctors and editors at [facility]   What is a physical? -- A physical is a routine visit, or \"check-up,\" with your doctor. You might also hear it called a \"wellness visit\" or \"preventive visit.\"  During each visit, the doctor will:   Ask about your physical and mental health   Ask about your habits, behaviors, and lifestyle   Do an exam   Give you vaccines if needed   Talk to you about any medicines you take   Give advice about your health   Answer your questions  Getting regular check-ups is an important part of taking care of your health. It can help your doctor find and treat any problems you have. But it's also important for preventing health problems.  A routine physical is different from a \"sick visit.\" A sick visit is when you see a doctor because of a health concern or problem. Since physicals are scheduled ahead of time, you can think about what you want to ask the doctor.  How often should I get a physical? -- It depends on your age and health. In general, for people age 21 years and older:   If you are younger than 50 years, you might be able to get a physical every 3 years.   If you are 50 years or older, your doctor might recommend a physical every year.  If you have an ongoing health condition, like diabetes or high blood pressure, your doctor will probably want to see you more often.  What happens during a physical? -- In general, each visit will include:   Physical exam - The doctor or nurse will check your height, weight, heart rate, and blood pressure. They will also look at your eyes and ears. They will ask about how you are feeling and whether you have any symptoms that bother you.   Medicines - It's a good idea to bring a list of all the medicines you take to each doctor visit. Your doctor will talk to you about your medicines and answer any questions. Tell them if you are having any side effects that bother you. You should also tell them if you are having trouble paying for any of your " "medicines.   Habits and behaviors - This includes:   Your diet   Your exercise habits   Whether you smoke, drink alcohol, or use drugs   Whether you are sexually active   Whether you feel safe at home  Your doctor will talk to you about things you can do to improve your health and lower your risk of health problems. They will also offer help and support. For example, if you want to quit smoking, they can give you advice and might prescribe medicines. If you want to improve your diet or get more physical activity, they can help you with this, too.   Lab tests, if needed - The tests you get will depend on your age and situation. For example, your doctor might want to check your:   Cholesterol   Blood sugar   Iron level   Vaccines - The recommended vaccines will depend on your age, health, and what vaccines you already had. Vaccines are very important because they can prevent certain serious or deadly infections.   Discussion of screening - \"Screening\" means checking for diseases or other health problems before they cause symptoms. Your doctor can recommend screening based on your age, risk, and preferences. This might include tests to check for:   Cancer, such as breast, prostate, cervical, ovarian, colorectal, prostate, lung, or skin cancer   Sexually transmitted infections, such as chlamydia and gonorrhea   Mental health conditions like depression and anxiety  Your doctor will talk to you about the different types of screening tests. They can help you decide which screenings to have. They can also explain what the results might mean.   Answering questions - The physical is a good time to ask the doctor or nurse questions about your health. If needed, they can refer you to other doctors or specialists, too.  Adults older than 65 years often need other care, too. As you get older, your doctor will talk to you about:   How to prevent falling at home   Hearing or vision tests   Memory testing   How to take your " medicines safely   Making sure that you have the help and support you need at home  All topics are updated as new evidence becomes available and our peer review process is complete.  This topic retrieved from [employer] on: May 02, 2024.  Topic 488586 Version 1.0  Release: 32.4.3 - C32.122  © 2024 UpToDate, Inc. and/or its affiliates. All rights reserved.  Consumer Information Use and Disclaimer   Disclaimer: This generalized information is a limited summary of diagnosis, treatment, and/or medication information. It is not meant to be comprehensive and should be used as a tool to help the user understand and/or assess potential diagnostic and treatment options. It does NOT include all information about conditions, treatments, medications, side effects, or risks that may apply to a specific patient. It is not intended to be medical advice or a substitute for the medical advice, diagnosis, or treatment of a health care provider based on the health care provider's examination and assessment of a patient's specific and unique circumstances. Patients must speak with a health care provider for complete information about their health, medical questions, and treatment options, including any risks or benefits regarding use of medications. This information does not endorse any treatments or medications as safe, effective, or approved for treating a specific patient. Up[employer], Inc[employer] and its affiliates disclaim any warranty or liability relating to this information or the use thereof.The use of this information is governed by the Terms of Use, available at https://www.woltersOndeegouwer.com/en/know/clinical-effectiveness-terms. 2024© Up[employer]Date, Inc. and its affiliates and/or licensors. All rights reserved.  Copyright   © 2024 [employer], [employer]. and/or its affiliates. All rights reserved.

## 2024-07-31 RX ORDER — TIRZEPATIDE 7.5 MG/.5ML
7.5 INJECTION, SOLUTION SUBCUTANEOUS WEEKLY
Qty: 2 ML | Refills: 0 | Status: SHIPPED | OUTPATIENT
Start: 2024-07-31

## 2024-08-02 ENCOUNTER — APPOINTMENT (OUTPATIENT)
Dept: LAB | Age: 39
End: 2024-08-02
Payer: COMMERCIAL

## 2024-08-02 DIAGNOSIS — Z13.6 SCREENING FOR CARDIOVASCULAR CONDITION: ICD-10-CM

## 2024-08-02 DIAGNOSIS — E03.8 OTHER SPECIFIED HYPOTHYROIDISM: ICD-10-CM

## 2024-08-02 DIAGNOSIS — Z13.1 SCREENING FOR DIABETES MELLITUS: ICD-10-CM

## 2024-08-02 LAB
ALBUMIN SERPL BCG-MCNC: 4.3 G/DL (ref 3.5–5)
ALP SERPL-CCNC: 51 U/L (ref 34–104)
ALT SERPL W P-5'-P-CCNC: 13 U/L (ref 7–52)
ANION GAP SERPL CALCULATED.3IONS-SCNC: 8 MMOL/L (ref 4–13)
AST SERPL W P-5'-P-CCNC: 13 U/L (ref 13–39)
BILIRUB SERPL-MCNC: 0.39 MG/DL (ref 0.2–1)
BUN SERPL-MCNC: 13 MG/DL (ref 5–25)
CALCIUM SERPL-MCNC: 9.7 MG/DL (ref 8.4–10.2)
CHLORIDE SERPL-SCNC: 103 MMOL/L (ref 96–108)
CHOLEST SERPL-MCNC: 217 MG/DL
CO2 SERPL-SCNC: 24 MMOL/L (ref 21–32)
CREAT SERPL-MCNC: 0.87 MG/DL (ref 0.6–1.3)
GFR SERPL CREATININE-BSD FRML MDRD: 84 ML/MIN/1.73SQ M
GLUCOSE P FAST SERPL-MCNC: 77 MG/DL (ref 65–99)
HDLC SERPL-MCNC: 45 MG/DL
LDLC SERPL CALC-MCNC: 100 MG/DL (ref 0–100)
NONHDLC SERPL-MCNC: 172 MG/DL
POTASSIUM SERPL-SCNC: 4 MMOL/L (ref 3.5–5.3)
PROT SERPL-MCNC: 7.4 G/DL (ref 6.4–8.4)
SODIUM SERPL-SCNC: 135 MMOL/L (ref 135–147)
T4 FREE SERPL-MCNC: 0.71 NG/DL (ref 0.61–1.12)
TRIGL SERPL-MCNC: 361 MG/DL
TSH SERPL DL<=0.05 MIU/L-ACNC: 6.42 UIU/ML (ref 0.45–4.5)

## 2024-08-02 PROCEDURE — 80053 COMPREHEN METABOLIC PANEL: CPT

## 2024-08-02 PROCEDURE — 84443 ASSAY THYROID STIM HORMONE: CPT

## 2024-08-02 PROCEDURE — 36415 COLL VENOUS BLD VENIPUNCTURE: CPT

## 2024-08-02 PROCEDURE — 80061 LIPID PANEL: CPT

## 2024-08-02 PROCEDURE — 84439 ASSAY OF FREE THYROXINE: CPT

## 2024-08-06 DIAGNOSIS — E03.8 OTHER SPECIFIED HYPOTHYROIDISM: Primary | ICD-10-CM

## 2024-08-06 RX ORDER — TIRZEPATIDE 7.5 MG/.5ML
7.5 INJECTION, SOLUTION SUBCUTANEOUS WEEKLY
Qty: 2 ML | Refills: 0 | Status: SHIPPED | OUTPATIENT
Start: 2024-08-06

## 2024-08-06 RX ORDER — LEVOTHYROXINE SODIUM 137 UG/1
137 TABLET ORAL
Qty: 90 TABLET | Refills: 0 | Status: SHIPPED | OUTPATIENT
Start: 2024-08-06

## 2024-08-07 DIAGNOSIS — Z30.011 ENCOUNTER FOR INITIAL PRESCRIPTION OF CONTRACEPTIVE PILLS: ICD-10-CM

## 2024-08-07 RX ORDER — DROSPIRENONE AND ETHINYL ESTRADIOL 0.03MG-3MG
1 KIT ORAL DAILY
Qty: 28 TABLET | Refills: 0 | Status: SHIPPED | OUTPATIENT
Start: 2024-08-07

## 2024-09-02 DIAGNOSIS — Z30.011 ENCOUNTER FOR INITIAL PRESCRIPTION OF CONTRACEPTIVE PILLS: ICD-10-CM

## 2024-09-02 RX ORDER — DROSPIRENONE AND ETHINYL ESTRADIOL 0.03MG-3MG
1 KIT ORAL DAILY
Qty: 84 TABLET | Refills: 1 | Status: SHIPPED | OUTPATIENT
Start: 2024-09-02

## 2024-10-30 DIAGNOSIS — E66.01 CLASS 3 SEVERE OBESITY DUE TO EXCESS CALORIES WITHOUT SERIOUS COMORBIDITY WITH BODY MASS INDEX (BMI) OF 45.0 TO 49.9 IN ADULT (HCC): ICD-10-CM

## 2024-10-30 DIAGNOSIS — E66.813 CLASS 3 SEVERE OBESITY DUE TO EXCESS CALORIES WITHOUT SERIOUS COMORBIDITY WITH BODY MASS INDEX (BMI) OF 45.0 TO 49.9 IN ADULT (HCC): ICD-10-CM

## 2024-10-30 DIAGNOSIS — Z78.9 DIETING: ICD-10-CM
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
## 2024-10-30 DIAGNOSIS — R73.03 PREDIABETES: ICD-10-CM

## 2024-10-31 RX ORDER — TIRZEPATIDE 15 MG/.5ML
INJECTION, SOLUTION SUBCUTANEOUS
Qty: 2 ML | Refills: 0 | Status: SHIPPED | OUTPATIENT
Start: 2024-10-31

## 2024-11-04 DIAGNOSIS — E03.8 OTHER SPECIFIED HYPOTHYROIDISM: ICD-10-CM

## 2024-11-05 RX ORDER — LEVOTHYROXINE SODIUM 137 UG/1
137 TABLET ORAL
Qty: 90 TABLET | Refills: 1 | Status: SHIPPED | OUTPATIENT
Start: 2024-11-05

## 2024-11-08 DIAGNOSIS — F41.9 ANXIETY: ICD-10-CM

## 2024-11-08 RX ORDER — CITALOPRAM HYDROBROMIDE 40 MG/1
40 TABLET ORAL DAILY
Qty: 90 TABLET | Refills: 1 | Status: SHIPPED | OUTPATIENT
Start: 2024-11-08

## 2024-11-26 ENCOUNTER — APPOINTMENT (EMERGENCY)
Dept: CT IMAGING | Facility: HOSPITAL | Age: 39
End: 2024-11-26
Payer: COMMERCIAL

## 2024-11-26 ENCOUNTER — OFFICE VISIT (OUTPATIENT)
Dept: URGENT CARE | Age: 39
End: 2024-11-26
Payer: COMMERCIAL

## 2024-11-26 ENCOUNTER — HOSPITAL ENCOUNTER (EMERGENCY)
Facility: HOSPITAL | Age: 39
Discharge: HOME | End: 2024-11-26
Attending: EMERGENCY MEDICINE
Payer: COMMERCIAL

## 2024-11-26 VITALS
RESPIRATION RATE: 18 BRPM | TEMPERATURE: 100.4 F | SYSTOLIC BLOOD PRESSURE: 124 MMHG | OXYGEN SATURATION: 98 % | DIASTOLIC BLOOD PRESSURE: 78 MMHG | HEART RATE: 121 BPM

## 2024-11-26 VITALS
SYSTOLIC BLOOD PRESSURE: 124 MMHG | DIASTOLIC BLOOD PRESSURE: 72 MMHG | OXYGEN SATURATION: 97 % | TEMPERATURE: 99.6 F | HEART RATE: 108 BPM | RESPIRATION RATE: 16 BRPM

## 2024-11-26 DIAGNOSIS — R68.89 FLU-LIKE SYMPTOMS: Primary | ICD-10-CM

## 2024-11-26 DIAGNOSIS — R11.2 NAUSEA & VOMITING: ICD-10-CM

## 2024-11-26 DIAGNOSIS — R10.9 ABDOMINAL PAIN: ICD-10-CM

## 2024-11-26 DIAGNOSIS — R10.9 ABDOMINAL PAIN, UNSPECIFIED ABDOMINAL LOCATION: Primary | ICD-10-CM

## 2024-11-26 LAB
ALBUMIN SERPL BCG-MCNC: 4.4 G/DL (ref 3.5–5)
ALP SERPL-CCNC: 59 U/L (ref 34–104)
ALT SERPL W P-5'-P-CCNC: 10 U/L (ref 7–52)
ANION GAP SERPL CALCULATED.3IONS-SCNC: 7 MMOL/L (ref 4–13)
AST SERPL W P-5'-P-CCNC: 11 U/L (ref 13–39)
BACTERIA UR QL AUTO: ABNORMAL /HPF
BASOPHILS # BLD MANUAL: 0.11 THOUSAND/UL (ref 0–0.1)
BASOPHILS NFR MAR MANUAL: 1 % (ref 0–1)
BILIRUB SERPL-MCNC: 0.71 MG/DL (ref 0.2–1)
BILIRUB UR QL STRIP: NEGATIVE
BUN SERPL-MCNC: 18 MG/DL (ref 5–25)
CALCIUM SERPL-MCNC: 9.3 MG/DL (ref 8.4–10.2)
CHLORIDE SERPL-SCNC: 103 MMOL/L (ref 96–108)
CLARITY UR: CLEAR
CO2 SERPL-SCNC: 26 MMOL/L (ref 21–32)
COLOR UR: YELLOW
CREAT SERPL-MCNC: 0.8 MG/DL (ref 0.6–1.3)
EOSINOPHIL # BLD MANUAL: 0 THOUSAND/UL (ref 0–0.4)
EOSINOPHIL NFR BLD MANUAL: 0 % (ref 0–6)
ERYTHROCYTE [DISTWIDTH] IN BLOOD BY AUTOMATED COUNT: 12.2 % (ref 11.6–15.1)
EXT PREGNANCY TEST URINE: NEGATIVE
EXT. CONTROL: NORMAL
FLUAV AG UPPER RESP QL IA.RAPID: NEGATIVE
FLUBV AG UPPER RESP QL IA.RAPID: NEGATIVE
GFR SERPL CREATININE-BSD FRML MDRD: 93 ML/MIN/1.73SQ M
GLUCOSE SERPL-MCNC: 104 MG/DL (ref 65–140)
GLUCOSE UR STRIP-MCNC: NEGATIVE MG/DL
HCT VFR BLD AUTO: 46 % (ref 34.8–46.1)
HGB BLD-MCNC: 15.4 G/DL (ref 11.5–15.4)
HGB UR QL STRIP.AUTO: ABNORMAL
HYALINE CASTS #/AREA URNS LPF: ABNORMAL /LPF
KETONES UR STRIP-MCNC: ABNORMAL MG/DL
LEUKOCYTE ESTERASE UR QL STRIP: NEGATIVE
LIPASE SERPL-CCNC: 17 U/L (ref 11–82)
LYMPHOCYTES # BLD AUTO: 0.44 THOUSAND/UL (ref 0.6–4.47)
LYMPHOCYTES # BLD AUTO: 4 % (ref 14–44)
MCH RBC QN AUTO: 29.1 PG (ref 26.8–34.3)
MCHC RBC AUTO-ENTMCNC: 33.5 G/DL (ref 31.4–37.4)
MCV RBC AUTO: 87 FL (ref 82–98)
MONOCYTES # BLD AUTO: 0.11 THOUSAND/UL (ref 0–1.22)
MONOCYTES NFR BLD: 1 % (ref 4–12)
MUCOUS THREADS UR QL AUTO: ABNORMAL
NEUTROPHILS # BLD MANUAL: 10.27 THOUSAND/UL (ref 1.85–7.62)
NEUTS BAND NFR BLD MANUAL: 2 % (ref 0–8)
NEUTS SEG NFR BLD AUTO: 92 % (ref 43–75)
NITRITE UR QL STRIP: NEGATIVE
NON-SQ EPI CELLS URNS QL MICRO: ABNORMAL /HPF
PH UR STRIP.AUTO: 7 [PH] (ref 4.5–8)
PLATELET # BLD AUTO: 351 THOUSANDS/UL (ref 149–390)
PLATELET BLD QL SMEAR: ADEQUATE
PMV BLD AUTO: 10 FL (ref 8.9–12.7)
POTASSIUM SERPL-SCNC: 3.6 MMOL/L (ref 3.5–5.3)
PROT SERPL-MCNC: 7.6 G/DL (ref 6.4–8.4)
PROT UR STRIP-MCNC: ABNORMAL MG/DL
RBC # BLD AUTO: 5.29 MILLION/UL (ref 3.81–5.12)
RBC #/AREA URNS AUTO: ABNORMAL /HPF
RBC MORPH BLD: NORMAL
SARS-COV+SARS-COV-2 AG RESP QL IA.RAPID: NEGATIVE
SODIUM SERPL-SCNC: 136 MMOL/L (ref 135–147)
SP GR UR STRIP.AUTO: 1.02 (ref 1–1.03)
TRANS CELLS #/AREA URNS HPF: PRESENT /[HPF]
UROBILINOGEN UR QL STRIP.AUTO: 0.2 E.U./DL
WBC # BLD AUTO: 10.93 THOUSAND/UL (ref 4.31–10.16)
WBC #/AREA URNS AUTO: ABNORMAL /HPF

## 2024-11-26 PROCEDURE — 36415 COLL VENOUS BLD VENIPUNCTURE: CPT

## 2024-11-26 PROCEDURE — 99284 EMERGENCY DEPT VISIT MOD MDM: CPT

## 2024-11-26 PROCEDURE — 87804 INFLUENZA ASSAY W/OPTIC: CPT

## 2024-11-26 PROCEDURE — 99285 EMERGENCY DEPT VISIT HI MDM: CPT

## 2024-11-26 PROCEDURE — 83690 ASSAY OF LIPASE: CPT

## 2024-11-26 PROCEDURE — 96375 TX/PRO/DX INJ NEW DRUG ADDON: CPT

## 2024-11-26 PROCEDURE — 81025 URINE PREGNANCY TEST: CPT

## 2024-11-26 PROCEDURE — 99213 OFFICE O/P EST LOW 20 MIN: CPT | Performed by: EMERGENCY MEDICINE

## 2024-11-26 PROCEDURE — 80053 COMPREHEN METABOLIC PANEL: CPT

## 2024-11-26 PROCEDURE — 74177 CT ABD & PELVIS W/CONTRAST: CPT

## 2024-11-26 PROCEDURE — 96367 TX/PROPH/DG ADDL SEQ IV INF: CPT

## 2024-11-26 PROCEDURE — 81001 URINALYSIS AUTO W/SCOPE: CPT

## 2024-11-26 PROCEDURE — 87811 SARS-COV-2 COVID19 W/OPTIC: CPT

## 2024-11-26 PROCEDURE — 85027 COMPLETE CBC AUTOMATED: CPT

## 2024-11-26 PROCEDURE — 96361 HYDRATE IV INFUSION ADD-ON: CPT

## 2024-11-26 PROCEDURE — 85007 BL SMEAR W/DIFF WBC COUNT: CPT

## 2024-11-26 PROCEDURE — 96365 THER/PROPH/DIAG IV INF INIT: CPT

## 2024-11-26 RX ORDER — ACETAMINOPHEN 10 MG/ML
1000 INJECTION, SOLUTION INTRAVENOUS ONCE
Status: COMPLETED | OUTPATIENT
Start: 2024-11-26 | End: 2024-11-26

## 2024-11-26 RX ORDER — ONDANSETRON 4 MG/1
4 TABLET, FILM COATED ORAL EVERY 6 HOURS
Qty: 12 TABLET | Refills: 0 | Status: SHIPPED | OUTPATIENT
Start: 2024-11-26

## 2024-11-26 RX ORDER — DICYCLOMINE HCL 20 MG
20 TABLET ORAL 2 TIMES DAILY
Qty: 20 TABLET | Refills: 0 | Status: SHIPPED | OUTPATIENT
Start: 2024-11-26

## 2024-11-26 RX ORDER — MAGNESIUM SULFATE HEPTAHYDRATE 40 MG/ML
2 INJECTION, SOLUTION INTRAVENOUS ONCE
Status: COMPLETED | OUTPATIENT
Start: 2024-11-26 | End: 2024-11-26

## 2024-11-26 RX ORDER — ONDANSETRON 2 MG/ML
4 INJECTION INTRAMUSCULAR; INTRAVENOUS EVERY 4 HOURS PRN
Status: DISCONTINUED | OUTPATIENT
Start: 2024-11-26 | End: 2024-11-26 | Stop reason: HOSPADM

## 2024-11-26 RX ORDER — MAGNESIUM SULFATE HEPTAHYDRATE 40 MG/ML
2 INJECTION, SOLUTION INTRAVENOUS ONCE
Status: DISCONTINUED | OUTPATIENT
Start: 2024-11-26 | End: 2024-11-26

## 2024-11-26 RX ORDER — KETOROLAC TROMETHAMINE 30 MG/ML
15 INJECTION, SOLUTION INTRAMUSCULAR; INTRAVENOUS ONCE
Status: COMPLETED | OUTPATIENT
Start: 2024-11-26 | End: 2024-11-26

## 2024-11-26 RX ADMIN — ACETAMINOPHEN 1000 MG: 10 INJECTION INTRAVENOUS at 20:36

## 2024-11-26 RX ADMIN — IOHEXOL 100 ML: 350 INJECTION, SOLUTION INTRAVENOUS at 19:23

## 2024-11-26 RX ADMIN — SODIUM CHLORIDE 500 ML: 0.9 INJECTION, SOLUTION INTRAVENOUS at 19:37

## 2024-11-26 RX ADMIN — MAGNESIUM SULFATE HEPTAHYDRATE 2 G: 40 INJECTION, SOLUTION INTRAVENOUS at 21:01

## 2024-11-26 RX ADMIN — ONDANSETRON 4 MG: 2 INJECTION INTRAMUSCULAR; INTRAVENOUS at 17:57

## 2024-11-26 RX ADMIN — SODIUM CHLORIDE 1000 ML: 0.9 INJECTION, SOLUTION INTRAVENOUS at 17:44

## 2024-11-26 RX ADMIN — SODIUM CHLORIDE 500 ML: 0.9 INJECTION, SOLUTION INTRAVENOUS at 21:01

## 2024-11-26 RX ADMIN — KETOROLAC TROMETHAMINE 15 MG: 30 INJECTION, SOLUTION INTRAMUSCULAR; INTRAVENOUS at 19:55

## 2024-11-26 NOTE — ED PROVIDER NOTES
"Time reflects when diagnosis was documented in both MDM as applicable and the Disposition within this note       Time User Action Codes Description Comment    11/26/2024  8:09 PM Kala Cuadra [R68.89] Flu-like symptoms     11/26/2024  8:09 PM Kala Cuadra [R11.2] Nausea & vomiting     11/26/2024  8:10 PM Kala Cuadra [R10.9] Abdominal pain           ED Disposition       ED Disposition   Discharge    Condition   Stable    Date/Time   Tue Nov 26, 2024  8:10 PM    Comment   Estefany Rangel discharge to home/self care.                   Assessment & Plan       Medical Decision Making  DDx includes cholecystitis, cholelithiasis, biliary colic, gastritis, gastric ulcer, duodenal ulcer, pancreatitis, appendicitis, muscle spasm, diverticulitis, UTI, pyelonephritis, bowel obstruction, incarcerated hernia, viral syndrome, hyperglycemia    CBC ordered to rule out infection, anemia.  CMP ordered to rule out electrolyte abnormality, kidney injury, liver injury.  Lipase ordered to rule out pancreatitis.  UA shows no acute infection. Urine pregnancy negative.  CT abdomen pelvis show no sign of acute infection.   Prescribed bentyl, zofran, omeprazole.  Patient is due for a dose of zepbound tomorrow. Discussed speaking with her prescriber prior to dose.   She reports body aches and headaches after fluids and toradol, body aches partially improved. Ofirmev and magnesium ordered.     Discussed findings from the visit with the patient.  We had a conversation regarding supportive care and indications for return.  Recommended appropriate follow-up.  Patient and/or family understand and agree with plan.    Portions of the record may have been created with voice recognition software. Occasional use of the incorrect word or \"sound a like\" substitutions may have occurred due to the inherent limitations of voice recognition software. Read the chart carefully and recognize, using context, where substitutions have " occurred.         Amount and/or Complexity of Data Reviewed  Labs: ordered.  Radiology: ordered.    Risk  Prescription drug management.             Medications   sodium chloride 0.9 % bolus 1,000 mL (0 mL Intravenous Stopped 11/26/24 1857)   sodium chloride 0.9 % bolus 500 mL (0 mL Intravenous Stopped 11/26/24 2031)   iohexol (OMNIPAQUE) 350 MG/ML injection (MULTI-DOSE) 100 mL (100 mL Intravenous Given 11/26/24 1923)   ketorolac (TORADOL) injection 15 mg (15 mg Intravenous Given 11/26/24 1955)   sodium chloride 0.9 % bolus 500 mL (0 mL Intravenous Stopped 11/26/24 2142)   acetaminophen (Ofirmev) injection 1,000 mg (0 mg Intravenous Stopped 11/26/24 2104)   magnesium sulfate 2 g/50 mL IVPB (premix) 2 g (0 g Intravenous Stopped 11/26/24 2142)       ED Risk Strat Scores                           SBIRT 20yo+      Flowsheet Row Most Recent Value   Initial Alcohol Screen: US AUDIT-C     1. How often do you have a drink containing alcohol? 0 Filed at: 11/26/2024 1625   2. How many drinks containing alcohol do you have on a typical day you are drinking?  0 Filed at: 11/26/2024 1625   3b. FEMALE Any Age, or MALE 65+: How often do you have 4 or more drinks on one occassion? 0 Filed at: 11/26/2024 1625   Audit-C Score 0 Filed at: 11/26/2024 1625   ART: How many times in the past year have you...    Used an illegal drug or used a prescription medication for non-medical reasons? Never Filed at: 11/26/2024 1625                            History of Present Illness       Chief Complaint   Patient presents with    Vomiting     Pt states she was sent home from work for vomiting, has abd pain and has generalized body aches. Was seen at urgent care and they told her they could not do anything for her.        Past Medical History:   Diagnosis Date    BMI 45.0-49.9, adult (HCC)     Herniated disc, cervical     Hypertension       Past Surgical History:   Procedure Laterality Date    BREAST SURGERY      reduction    TONSILLECTOMY       "  Family History   Problem Relation Age of Onset    Hyperlipidemia Mother     Hypertension Mother     Asthma Mother     Hyperlipidemia Father     Hypertension Father     No Known Problems Brother     Ovarian cancer Maternal Grandmother     Hyperlipidemia Maternal Grandfather     Hypertension Maternal Grandfather     Diabetes Maternal Grandfather     No Known Problems Paternal Grandmother     Hypertension Paternal Grandfather     Diabetes Paternal Grandfather     Breast cancer Neg Hx     Colon cancer Neg Hx       Social History     Tobacco Use    Smoking status: Never     Passive exposure: Never    Smokeless tobacco: Never    Tobacco comments:     None   Vaping Use    Vaping status: Never Used   Substance Use Topics    Alcohol use: Yes     Comment: social drinker (1-2 drinks per month)    Drug use: No      E-Cigarette/Vaping    E-Cigarette Use Never User       E-Cigarette/Vaping Substances    Nicotine No     THC No     CBD No     Flavoring No     Other No     Unknown No       I have reviewed and agree with the history as documented.     Patient presents with:  Vomiting: Pt states she was sent home from work for vomiting, has abd pain and has generalized body aches. Was seen at urgent care and they told her they could not do anything for her.     39-year-old female presenting to the emergency department with flu-like symptoms since this AM. Reports fatigue, headache, urinary frequency, multiple episodes of vomiting earlier today. RUQ and epigastric pain that is sharp, suprapubic cramping. Reports neighbors are sick. Was sent home from work due to vomiting and being \"off balance\".       Vomiting  Associated symptoms: abdominal pain, chills, headaches and myalgias    Associated symptoms: no cough, no fever and no sore throat        Review of Systems   Constitutional:  Positive for appetite change, chills and fatigue. Negative for fever.   HENT:  Negative for drooling, ear discharge, ear pain, nosebleeds, postnasal drip, " rhinorrhea, sore throat, trouble swallowing and voice change.    Eyes:  Negative for discharge.   Respiratory:  Negative for cough, shortness of breath and stridor.    Cardiovascular:  Negative for chest pain.   Gastrointestinal:  Positive for abdominal pain, nausea and vomiting.   Genitourinary:  Positive for frequency. Negative for decreased urine volume and dysuria.   Musculoskeletal:  Positive for myalgias. Negative for neck pain and neck stiffness.   Skin:  Negative for rash.   Neurological:  Positive for headaches. Negative for facial asymmetry.   Hematological:  Positive for adenopathy.           Objective       ED Triage Vitals [11/26/24 1624]   Temperature Pulse Blood Pressure Respirations SpO2 Patient Position - Orthostatic VS   99.2 °F (37.3 °C) (!) 120 142/88 18 100 % Sitting      Temp Source Heart Rate Source BP Location FiO2 (%) Pain Score    Oral Monitor Right arm -- 5      Vitals      Date and Time Temp Pulse SpO2 Resp BP Pain Score FACES Pain Rating User   11/26/24 1955 -- -- -- -- -- 4 -- MM   11/26/24 1937 -- 108 97 % 16 124/72 -- -- MM   11/26/24 1801 99.6 °F (37.6 °C) -- -- -- -- -- -- LAB   11/26/24 1624 99.2 °F (37.3 °C) 120 100 % 18 142/88 5 -- LB            Physical Exam  Vitals and nursing note reviewed.   Constitutional:       General: She is not in acute distress.     Appearance: She is well-developed. She is ill-appearing.   HENT:      Head: Normocephalic and atraumatic.   Eyes:      Conjunctiva/sclera: Conjunctivae normal.   Cardiovascular:      Rate and Rhythm: Normal rate and regular rhythm.      Heart sounds: No murmur heard.  Pulmonary:      Effort: Pulmonary effort is normal. No respiratory distress.      Breath sounds: Normal breath sounds. No wheezing or rales.   Abdominal:      General: There is no distension.      Palpations: Abdomen is soft.      Tenderness: There is abdominal tenderness in the right upper quadrant, epigastric area and suprapubic area. There is no right CVA  tenderness, left CVA tenderness or guarding. Negative signs include Fajardo's sign.   Musculoskeletal:         General: No swelling.      Cervical back: Neck supple.   Skin:     General: Skin is warm and dry.      Capillary Refill: Capillary refill takes less than 2 seconds.   Neurological:      Mental Status: She is alert.   Psychiatric:         Mood and Affect: Mood normal.         Results Reviewed       Procedure Component Value Units Date/Time    RBC Morphology Reflex Test [[id number]] Collected: 11/26/24 1742    Lab Status: Final result Specimen: Blood from Arm, Right Updated: 11/26/24 1901    CBC and differential [[id number]]  (Abnormal) Collected: 11/26/24 1742    Lab Status: Final result Specimen: Blood from Arm, Right Updated: 11/26/24 1833     WBC 10.93 Thousand/uL      RBC 5.29 Million/uL      Hemoglobin 15.4 g/dL      Hematocrit 46.0 %      MCV 87 fL      MCH 29.1 pg      MCHC 33.5 g/dL      RDW 12.2 %      MPV 10.0 fL      Platelets 351 Thousands/uL     Narrative:      This is an appended report.  These results have been appended to a previously verified report.    Manual Differential(PHLEBS Do Not Order) [[id number]]  (Abnormal) Collected: 11/26/24 1742    Lab Status: Final result Specimen: Blood from Arm, Right Updated: 11/26/24 1833     Segmented % 92 %      Bands % 2 %      Lymphocytes % 4 %      Monocytes % 1 %      Eosinophils % 0 %      Basophils % 1 %      Absolute Neutrophils 10.27 Thousand/uL      Absolute Lymphocytes 0.44 Thousand/uL      Absolute Monocytes 0.11 Thousand/uL      Absolute Eosinophils 0.00 Thousand/uL      Absolute Basophils 0.11 Thousand/uL      Total Counted --     RBC Morphology Normal     Platelet Estimate Adequate    FLU/COVID Rapid Antigen (30 min. TAT) - Preferred screening test in ED [[id number]]  (Normal) Collected: 11/26/24 1736    Lab Status: Final result Specimen: Nares from Nose Updated: 11/26/24 1831     SARS COV Rapid Antigen Negative     Influenza A Rapid Antigen  Negative     Influenza B Rapid Antigen Negative    Narrative:      This test has been performed using the Quidel Mindy 2 FLU+SARS Antigen test under the Emergency Use Authorization (EUA). This test has been validated by the  and verified by the performing laboratory. The Mindy uses lateral flow immunofluorescent sandwich assay to detect SARS-COV, Influenza A and Influenza B Antigen.     The Quidel Mindy 2 SARS Antigen test does not differentiate between SARS-CoV and SARS-CoV-2.     Negative results are presumptive and may be confirmed with a molecular assay, if necessary, for patient management. Negative results do not rule out SARS-CoV-2 or influenza infection and should not be used as the sole basis for treatment or patient management decisions. A negative test result may occur if the level of antigen in a sample is below the limit of detection of this test.     Positive results are indicative of the presence of viral antigens, but do not rule out bacterial infection or co-infection with other viruses.     All test results should be used as an adjunct to clinical observations and other information available to the provider.    FOR PEDIATRIC PATIENTS - copy/paste COVID Guidelines URL to browser: [email or web address]    Urine Microscopic [[id number]]  (Abnormal) Collected: 11/26/24 1748    Lab Status: Final result Specimen: Urine, Clean Catch Updated: 11/26/24 1824     RBC, UA 4-10 /hpf      WBC, UA 2-4 /hpf      Epithelial Cells Occasional /hpf      Bacteria, UA Occasional /hpf      MUCUS THREADS Occasional     Hyaline Casts, UA 0-3 /lpf      Transitional Epithelial Cells Present    Comprehensive metabolic panel [[id number]]  (Abnormal) Collected: 11/26/24 1742    Lab Status: Final result Specimen: Blood from Arm, Right Updated: 11/26/24 1809     Sodium 136 mmol/L      Potassium 3.6 mmol/L      Chloride 103 mmol/L      CO2 26 mmol/L      ANION GAP 7  mmol/L      BUN 18 mg/dL      Creatinine 0.80 mg/dL      Glucose 104 mg/dL      Calcium 9.3 mg/dL      AST 11 U/L      ALT 10 U/L      Alkaline Phosphatase 59 U/L      Total Protein 7.6 g/dL      Albumin 4.4 g/dL      Total Bilirubin 0.71 mg/dL      eGFR 93 ml/min/1.73sq m     Narrative:      National Kidney Disease Foundation guidelines for Chronic Kidney Disease (CKD):     Stage 1 with normal or high GFR (GFR > 90 mL/min/1.73 square meters)    Stage 2 Mild CKD (GFR = 60-89 mL/min/1.73 square meters)    Stage 3A Moderate CKD (GFR = 45-59 mL/min/1.73 square meters)    Stage 3B Moderate CKD (GFR = 30-44 mL/min/1.73 square meters)    Stage 4 Severe CKD (GFR = 15-29 mL/min/1.73 square meters)    Stage 5 End Stage CKD (GFR <15 mL/min/1.73 square meters)  Note: GFR calculation is accurate only with a steady state creatinine    Lipase [[id number]]  (Normal) Collected: 11/26/24 1742    Lab Status: Final result Specimen: Blood from Arm, Right Updated: 11/26/24 1809     Lipase 17 u/L     POCT pregnancy, urine [[id number]]  (Normal) Collected: 11/26/24 1747    Lab Status: Final result Updated: 11/26/24 1752     EXT Preg Test, Ur Negative     Control Valid    Urine Macroscopic, POC [[id number]]  (Abnormal) Collected: 11/26/24 1748    Lab Status: Final result Specimen: Urine Updated: 11/26/24 1749     Color, UA Yellow     Clarity, UA Clear     pH, UA 7.0     Leukocytes, UA Negative     Nitrite, UA Negative     Protein, UA Trace mg/dl      Glucose, UA Negative mg/dl      Ketones, UA 15 (1+) mg/dl      Urobilinogen, UA 0.2 E.U./dl      Bilirubin, UA Negative     Occult Blood, UA Trace     Specific Gravity, UA 1.020    Narrative:      CLINITEK RESULT            CT abdomen pelvis with contrast   Final Interpretation by Gomez So MD (11/26 1959)      No acute intra-abdominal or pelvic process.         Workstation performed: [id number]             Procedures    ED Medication and Procedure Management   Prior to Admission  Medications   Prescriptions Last Dose Informant Patient Reported? Taking?   Zepbound 15 MG/0.5ML auto-injector   No No   Sig: Inject 1 pen (15 mg total) under the skin once a week   cetirizine (ZyrTEC) 10 mg tablet  Self Yes No   Sig: Take 10 mg by mouth daily   citalopram (CeleXA) 40 mg tablet   No No   Sig: TAKE 1 TABLET (40 MG TOTAL) BY MOUTH IN THE MORNING   drospirenone-ethinyl estradiol (VERONIQUE) 3-0.03 MG per tablet   No No   Sig: TAKE 1 TABLET BY MOUTH EVERY DAY   levothyroxine 137 mcg tablet   No No   Sig: Take 1 tablet (137 mcg total) by mouth daily in the early morning   metFORMIN (GLUCOPHAGE-XR) 500 mg 24 hr tablet   No No   Sig: TAKE 2 PILL IN THE MORNING AND 2 PILLS AT NIGHT.   mometasone (ELOCON) 0.1 % lotion   No No   Sig: Apply topically daily   tirzepatide (Zepbound) 10 mg/0.5 mL auto-injector   No No   Sig: Inject 0.5 mL (10 mg total) under the skin once a week   tirzepatide (Zepbound) 12.5 mg/0.5 mL auto-injector   No No   Sig: Inject 0.5 mL (12.5 mg total) under the skin once a week   tirzepatide (Zepbound) 7.5 mg/0.5 mL auto-injector   No No   Sig: Inject 0.5 mL (7.5 mg total) under the skin once a week      Facility-Administered Medications: None     Discharge Medication List as of 11/26/2024  8:14 PM        START taking these medications    Details   dicyclomine (BENTYL) 20 mg tablet Take 1 tablet (20 mg total) by mouth 2 (two) times a day, Starting Tue 11/26/2024, Normal      omeprazole (PriLOSEC) 20 mg delayed release capsule Take 1 capsule (20 mg total) by mouth daily, Starting Tue 11/26/2024, Normal      ondansetron (ZOFRAN) 4 mg tablet Take 1 tablet (4 mg total) by mouth every 6 (six) hours, Starting Tue 11/26/2024, Normal           CONTINUE these medications which have NOT CHANGED    Details   cetirizine (ZyrTEC) 10 mg tablet Take 10 mg by mouth daily, Historical Med      citalopram (CeleXA) 40 mg tablet TAKE 1 TABLET (40 MG TOTAL) BY MOUTH IN THE MORNING, Starting Fri 11/8/2024, Normal       drospirenone-ethinyl estradiol (VERONIQUE) 3-0.03 MG per tablet TAKE 1 TABLET BY MOUTH EVERY DAY, Starting Mon 9/2/2024, Normal      levothyroxine 137 mcg tablet Take 1 tablet (137 mcg total) by mouth daily in the early morning, Starting Tue 11/5/2024, Normal      metFORMIN (GLUCOPHAGE-XR) 500 mg 24 hr tablet TAKE 2 PILL IN THE MORNING AND 2 PILLS AT NIGHT., Normal      mometasone (ELOCON) 0.1 % lotion Apply topically daily, Starting Tue 7/30/2024, Normal      tirzepatide (Zepbound) 10 mg/0.5 mL auto-injector Inject 0.5 mL (10 mg total) under the skin once a week, Starting Tue 7/30/2024, Normal      tirzepatide (Zepbound) 12.5 mg/0.5 mL auto-injector Inject 0.5 mL (12.5 mg total) under the skin once a week, Starting Tue 7/30/2024, Normal      tirzepatide (Zepbound) 7.5 mg/0.5 mL auto-injector Inject 0.5 mL (7.5 mg total) under the skin once a week, Starting Tue 8/6/2024, Normal      Zepbound 15 MG/0.5ML auto-injector Inject 1 pen (15 mg total) under the skin once a week, Normal           No discharge procedures on file.  ED SEPSIS DOCUMENTATION   Time reflects when diagnosis was documented in both MDM as applicable and the Disposition within this note       Time User Action Codes Description Comment    11/26/2024  8:09 PM Kala Cuadra [R68.89] Flu-like symptoms     11/26/2024  8:09 PM Kala Cuadra [R11.2] Nausea & vomiting     11/26/2024  8:10 PM Kala Cuadra [R10.9] Abdominal pain                  Kala Cuadra PA-C  11/26/24 2702

## 2024-11-26 NOTE — Clinical Note
Estefany Rangel was seen and treated in our emergency department on 11/26/2024.                Diagnosis: Medical condition    Estefany  may return to work on return date.    She may return on this date: 11/29/2024         If you have any questions or concerns, please don't hesitate to call.      Kala Cuadra PA-C    ______________________________           _______________          _______________  Hospital Representative                              Date                                Time

## 2024-11-26 NOTE — PROGRESS NOTES
[facility]        NAME: Estefany Rangel is a 39 y.o. female  : 1985    MRN: [id number]  DATE: 2024  TIME: 4:07 PM    Assessment and Plan   Abdominal pain, unspecified abdominal location [R10.9]  1. Abdominal pain, unspecified abdominal location          Vomiting, nausea, abdominal pain. Also has sinus pressure ( baseline and more concerned over abdomen). Exam notes tachycardia, febrile. TTP ( large) to light palpation of RUQ- pt notes pain generalized. Discussed ER for further eval    Patient Instructions       Follow up with PCP in 3-5 days.  Proceed to  ER if symptoms worsen.    If tests have been performed at [facility], our office will contact you with results if changes need to be made to the care plan discussed with you at the visit.  [facility]    Chief Complaint     Chief Complaint   Patient presents with    Facial Pain     Sinuses are hurting    Vomiting     Is nauseous and having stomach cramps         History of Present Illness       Vomiting, nausea, abdominal pain. Also has sinus pressure ( baseline and more concerned over abdomen). Exam notes tachycardia, febrile. TTP ( large) to light palpation of RUQ- pt notes pain generalized. Discussed ER for further eval    Vomiting   Associated symptoms include abdominal pain and a fever.       Review of Systems   Review of Systems   Constitutional:  Positive for fever.   HENT:  Positive for sinus pressure.    Gastrointestinal:  Positive for abdominal pain, nausea and vomiting.   All other systems reviewed and are negative.        Current Medications       Current Outpatient Medications:     cetirizine (ZyrTEC) 10 mg tablet, Take 10 mg by mouth daily, Disp: , Rfl:     citalopram (CeleXA) 40 mg tablet, TAKE 1 TABLET (40 MG TOTAL) BY MOUTH IN THE MORNING, Disp: 90 tablet, Rfl: 1    drospirenone-ethinyl estradiol (VERONIQUE) 3-0.03 MG per tablet, TAKE 1 TABLET BY MOUTH EVERY DAY, Disp: 84  tablet, Rfl: 1    levothyroxine 137 mcg tablet, Take 1 tablet (137 mcg total) by mouth daily in the early morning, Disp: 90 tablet, Rfl: 1    metFORMIN (GLUCOPHAGE-XR) 500 mg 24 hr tablet, TAKE 2 PILL IN THE MORNING AND 2 PILLS AT NIGHT., Disp: 360 tablet, Rfl: 1    mometasone (ELOCON) 0.1 % lotion, Apply topically daily, Disp: 30 mL, Rfl: 0    tirzepatide (Zepbound) 10 mg/0.5 mL auto-injector, Inject 0.5 mL (10 mg total) under the skin once a week, Disp: 2 mL, Rfl: 0    tirzepatide (Zepbound) 12.5 mg/0.5 mL auto-injector, Inject 0.5 mL (12.5 mg total) under the skin once a week, Disp: 2 mL, Rfl: 0    tirzepatide (Zepbound) 7.5 mg/0.5 mL auto-injector, Inject 0.5 mL (7.5 mg total) under the skin once a week, Disp: 2 mL, Rfl: 0    Zepbound 15 MG/0.5ML auto-injector, Inject 1 pen (15 mg total) under the skin once a week, Disp: 2 mL, Rfl: 0    Current Allergies     Allergies as of 11/26/2024 - Reviewed 11/26/2024   Allergen Reaction Noted    Erythromycin Rash 07/10/2012            The following portions of the patient's history were reviewed and updated as appropriate: allergies, current medications, past family history, past medical history, past social history, past surgical history and problem list.     Past Medical History:   Diagnosis Date    BMI 45.0-49.9, adult (HCC)     Herniated disc, cervical     Hypertension        Past Surgical History:   Procedure Laterality Date    BREAST SURGERY      reduction    TONSILLECTOMY         Family History   Problem Relation Age of Onset    Hyperlipidemia Mother     Hypertension Mother     Asthma Mother     Hyperlipidemia Father     Hypertension Father     No Known Problems Brother     Ovarian cancer Maternal Grandmother     Hyperlipidemia Maternal Grandfather     Hypertension Maternal Grandfather     Diabetes Maternal Grandfather     No Known Problems Paternal Grandmother     Hypertension Paternal Grandfather     Diabetes Paternal Grandfather     Breast cancer Neg Hx     Colon  cancer Neg Hx          Medications have been verified.        Objective   /78   Pulse (!) 121   Temp 100.4 °F (38 °C)   Resp 18   SpO2 98%   No LMP recorded.       Physical Exam     Physical Exam  Vitals reviewed.   Constitutional:       Appearance: She is ill-appearing.   Cardiovascular:      Rate and Rhythm: Regular rhythm. Tachycardia present.      Pulses: Normal pulses.      Heart sounds: Normal heart sounds.   Pulmonary:      Effort: Pulmonary effort is normal.      Breath sounds: Normal breath sounds.   Abdominal:      General: Bowel sounds are normal.      Palpations: Abdomen is soft.      Tenderness: There is abdominal tenderness. There is guarding.   Musculoskeletal:      Cervical back: Normal range of motion.   Neurological:      Mental Status: She is alert.

## 2024-11-27 NOTE — DISCHARGE INSTRUCTIONS
Use zofran for nausea, use bentyl for abdominal cramping, use omeprazole for upper abdominal pain.   Use Tylenol/Motrin as needed for fever, pain relief.    Encourage hydration and rest.  Practice good hand hygiene.   Monitor for worsening symptoms.  Follow-up with primary care.
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
## 2024-11-29 DIAGNOSIS — E66.813 CLASS 3 SEVERE OBESITY DUE TO EXCESS CALORIES WITHOUT SERIOUS COMORBIDITY WITH BODY MASS INDEX (BMI) OF 45.0 TO 49.9 IN ADULT (HCC): ICD-10-CM

## 2024-11-29 DIAGNOSIS — Z78.9 DIETING: ICD-10-CM

## 2024-11-29 DIAGNOSIS — E66.01 CLASS 3 SEVERE OBESITY DUE TO EXCESS CALORIES WITHOUT SERIOUS COMORBIDITY WITH BODY MASS INDEX (BMI) OF 45.0 TO 49.9 IN ADULT (HCC): ICD-10-CM

## 2024-11-29 DIAGNOSIS — R73.03 PREDIABETES: ICD-10-CM

## 2024-12-03 ENCOUNTER — TELEPHONE (OUTPATIENT)
Age: 39
End: 2024-12-03

## 2024-12-03 RX ORDER — TIRZEPATIDE 15 MG/.5ML
INJECTION, SOLUTION SUBCUTANEOUS
Qty: 2 ML | Refills: 0 | Status: SHIPPED | OUTPATIENT
Start: 2024-12-03 | End: 2024-12-09 | Stop reason: SDUPTHER

## 2024-12-09 DIAGNOSIS — E78.5 DYSLIPIDEMIA: ICD-10-CM

## 2024-12-09 DIAGNOSIS — Z78.9 DIETING: ICD-10-CM

## 2024-12-09 DIAGNOSIS — R73.03 PREDIABETES: Primary | ICD-10-CM

## 2024-12-09 DIAGNOSIS — E66.01 CLASS 3 SEVERE OBESITY DUE TO EXCESS CALORIES WITHOUT SERIOUS COMORBIDITY WITH BODY MASS INDEX (BMI) OF 45.0 TO 49.9 IN ADULT (HCC): ICD-10-CM

## 2024-12-09 DIAGNOSIS — E66.813 CLASS 3 SEVERE OBESITY DUE TO EXCESS CALORIES WITHOUT SERIOUS COMORBIDITY WITH BODY MASS INDEX (BMI) OF 45.0 TO 49.9 IN ADULT (HCC): ICD-10-CM
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
## 2024-12-09 RX ORDER — TIRZEPATIDE 15 MG/.5ML
15 INJECTION, SOLUTION SUBCUTANEOUS WEEKLY
Qty: 2 ML | Refills: 0 | Status: SHIPPED | OUTPATIENT
Start: 2024-12-09 | End: 2024-12-12 | Stop reason: SDUPTHER

## 2024-12-10 NOTE — ASSESSMENT & PLAN NOTE
Wt Readings from Last 3 Encounters:   07/30/24 122 kg (270 lb)   06/05/24 129 kg (284 lb)   02/05/24 127 kg (280 lb)

## 2024-12-12 DIAGNOSIS — Z78.9 DIETING: ICD-10-CM

## 2024-12-12 DIAGNOSIS — E66.813 CLASS 3 SEVERE OBESITY DUE TO EXCESS CALORIES WITHOUT SERIOUS COMORBIDITY WITH BODY MASS INDEX (BMI) OF 45.0 TO 49.9 IN ADULT (HCC): ICD-10-CM

## 2024-12-12 DIAGNOSIS — R73.03 PREDIABETES: ICD-10-CM

## 2024-12-12 DIAGNOSIS — E78.5 DYSLIPIDEMIA: ICD-10-CM
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
## 2024-12-12 DIAGNOSIS — E66.01 CLASS 3 SEVERE OBESITY DUE TO EXCESS CALORIES WITHOUT SERIOUS COMORBIDITY WITH BODY MASS INDEX (BMI) OF 45.0 TO 49.9 IN ADULT (HCC): ICD-10-CM

## 2024-12-16 RX ORDER — TIRZEPATIDE 15 MG/.5ML
15 INJECTION, SOLUTION SUBCUTANEOUS WEEKLY
Qty: 2 ML | Refills: 0 | Status: SHIPPED | OUTPATIENT
Start: 2024-12-16

## 2024-12-18 DIAGNOSIS — R10.9 ABDOMINAL PAIN: ICD-10-CM

## 2024-12-20 RX ORDER — DICYCLOMINE HCL 20 MG
20 TABLET ORAL 2 TIMES DAILY
Qty: 20 TABLET | Refills: 0 | Status: SHIPPED | OUTPATIENT
Start: 2024-12-20

## 2024-12-24 DIAGNOSIS — Z78.9 DIETING: ICD-10-CM

## 2024-12-24 DIAGNOSIS — R73.03 PREDIABETES: ICD-10-CM

## 2024-12-24 DIAGNOSIS — E78.5 DYSLIPIDEMIA: ICD-10-CM

## 2024-12-24 DIAGNOSIS — E66.01 CLASS 3 SEVERE OBESITY DUE TO EXCESS CALORIES WITHOUT SERIOUS COMORBIDITY WITH BODY MASS INDEX (BMI) OF 45.0 TO 49.9 IN ADULT (HCC): ICD-10-CM

## 2024-12-24 DIAGNOSIS — E66.813 CLASS 3 SEVERE OBESITY DUE TO EXCESS CALORIES WITHOUT SERIOUS COMORBIDITY WITH BODY MASS INDEX (BMI) OF 45.0 TO 49.9 IN ADULT (HCC): ICD-10-CM
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
## 2025-01-02 RX ORDER — TIRZEPATIDE 15 MG/.5ML
15 INJECTION, SOLUTION SUBCUTANEOUS WEEKLY
Qty: 2 ML | Refills: 0 | Status: SHIPPED | OUTPATIENT
Start: 2025-01-02

## 2025-01-04 NOTE — PROGRESS NOTES
Name: Estefany Rangel      : 1985      MRN: [id number]  Encounter Provider: FLORENTINO Hoffman  Encounter Date: 2025   Encounter department: [facility]  :  Assessment & Plan  Well woman exam with routine gynecological exam  All questions answered.  Await pap smear results.  Breast self exam technique reviewed and patient encouraged to perform self-exam monthly.  Diagnosis explained in detail, including differential.  Dietary diary.  Discussed healthy lifestyle modifications.  Educational material distributed.  Follow up in 1 year.  Follow up as needed.  Thin prep Pap smear.  Breast awareness reviewed  Encouraged healthy diet, exercise and lifestyle  Encouraged follow-up with PCP as needed  Encouraged seasonal influenza vaccination  Orders:    Liquid-based pap, screening      Will call / Diurnalt message with results  VBI-    BMI Counseling: Body mass index is 42.97 kg/m². The BMI is above normal. Nutrition recommendations include 3-5 servings of fruits/vegetables daily. Exercise recommendations include exercising 3-5 times per week.  Continue follow-up with weight management as scheduled     RTO 1 year for annual exam     History of Present Illness   HPI  Estefany Rangel is a 39 y.o. female who presents for annual well woman exam.  Last Pap smear  21 NILM/. HR HPV(-).  Periods are regular every 28-30 days, lasting 3-4 days. No intermenstrual bleeding, spotting, or discharge.    Current contraception: none  History of abnormal Pap smear: no  Family history of uterine or ovarian cancer: yes - Maternal Grandmother - ovarian cancer   Regular self breast exam: no  History of abnormal mammogram: to [place] at age 41 unless otherwise clinically indicated   Family history of breast cancer: no  History of abnormal lipids: no  Gardasil vaccine: completed series       History obtained from: patient    Review of Systems   Constitutional:  Negative for chills and fever.  "  Respiratory: Negative.     Cardiovascular: Negative.    Genitourinary: Negative.      Medical History Reviewed by provider this encounter:  Tobacco  Allergies  Meds  Problems  Med Hx  Surg Hx  Fam Hx  Soc   Hx    .     Objective   /96   Ht 5' 5\" (1.651 m)   Wt 117 kg (258 lb 3.2 oz)   LMP 2024   BMI 42.97 kg/m²      Physical Exam  General:   alert and oriented, in no acute distress, alert, appears stated age, and cooperative   Heart: regular rate and rhythm, S1, S2 normal, no murmur, click, rub or gallop   Lungs: clear to auscultation bilaterally   Abdomen: soft, non-tender, without masses or organomegaly   Vulva: normal, Bartholin's, Urethra, [place]'s normal   Vagina: normal mucosa, normal discharge, no palpable nodules   Cervix: no bleeding following Pap, no cervical motion tenderness, and no lesions   Uterus: normal size, non-tender, normal shape and consistency   Adnexa: normal adnexa and no mass, fullness, tenderness   Breast: breasts appear normal, no suspicious masses, no skin or nipple changes or axillary nodes.  Bilateral anchor scars noted on exam       Menstrual History:  OB History          0    Para   0    Term   0       0    AB   0    Living   0         SAB   0    IAB   0    Ectopic   0    Multiple   0    Live Births   0           Obstetric Comments   Menarche [age]              "

## 2025-01-06 ENCOUNTER — ANNUAL EXAM (OUTPATIENT)
Dept: OBGYN CLINIC | Facility: MEDICAL CENTER | Age: 40
End: 2025-01-06
Payer: COMMERCIAL

## 2025-01-06 VITALS
DIASTOLIC BLOOD PRESSURE: 96 MMHG | SYSTOLIC BLOOD PRESSURE: 128 MMHG | HEIGHT: 65 IN | BODY MASS INDEX: 43.02 KG/M2 | WEIGHT: 258.2 LBS

## 2025-01-06 DIAGNOSIS — Z01.419 WELL WOMAN EXAM WITH ROUTINE GYNECOLOGICAL EXAM: Primary | ICD-10-CM

## 2025-01-06 PROBLEM — M54.2 NECK PAIN: Status: RESOLVED | Noted: 2022-02-24 | Resolved: 2025-01-06

## 2025-01-06 PROBLEM — S93.401A SPRAIN OF UNSPECIFIED LIGAMENT OF RIGHT ANKLE, INITIAL ENCOUNTER: Status: RESOLVED | Noted: 2024-01-02 | Resolved: 2025-01-06

## 2025-01-06 PROCEDURE — G0476 HPV COMBO ASSAY CA SCREEN: HCPCS | Performed by: NURSE PRACTITIONER

## 2025-01-06 PROCEDURE — G0145 SCR C/V CYTO,THINLAYER,RESCR: HCPCS | Performed by: NURSE PRACTITIONER

## 2025-01-06 PROCEDURE — S0612 ANNUAL GYNECOLOGICAL EXAMINA: HCPCS | Performed by: NURSE PRACTITIONER

## 2025-01-08 ENCOUNTER — TELEPHONE (OUTPATIENT)
Age: 40
End: 2025-01-08

## 2025-01-08 NOTE — TELEPHONE ENCOUNTER
As per discussion with pcp :    Appeal letter was already created and sent in  addition to pts note where faxed to patients insurance. At this point it is up to insurance for approval . If pt would like we can place a referral for weight management or when pt calls insurance for status she can c heck if they would approve  5mg - 10mg for maintenance use .    My chart message sent

## 2025-01-08 NOTE — TELEPHONE ENCOUNTER
Patient called regarding zepbound appeal. States she has been talking to her insurance and they sent documentation about what they need for an appeal. Patient states she is desperate to get this approved and will come into the office to discuss it if needed. States this medication made her feel better mentally and physically. Please call or message patient in [employer]. Appeal documentation letter from insurance is located under media tab dated 1/6.

## 2025-01-09 LAB
LAB AP GYN PRIMARY INTERPRETATION: NORMAL
Lab: NORMAL

## 2025-01-10 ENCOUNTER — RESULTS FOLLOW-UP (OUTPATIENT)
Dept: OBGYN CLINIC | Facility: CLINIC | Age: 40
End: 2025-01-10

## 2025-01-14 NOTE — PROGRESS NOTES
Assessment & Plan  Class 3 obesity    Initial weight was 303: 4/20/2022    Current weight: 258, patient was previously on Zepbound 15 but states that her insurance was no longer covering the medication.  She states that she was taken off the medication around October/November.  Since stopping Zepbound she now has decreased energy levels and lack of motivation but does her best with portion control and has incorporated about 6-8000 steps daily which is tracked by her [employer] smart watch.        Restarting Zepbound at 2.5 as patient has been off it the medication for couple of months.  Stressed the importance of using lifestyle medications in conjunction with medication.  Patient does make healthy choices but recommended that she increase her protein and fluid intake    She also does her best to eat healthy with calorie restriction.  Her exercise is also limited by her cervical herniated disc. Comorbidities include prediabetes, PCOS, hypothyroidism    Recommend practicing mindful eating and drinking with frequent meals/snacks as it can potentially improve metabolism and allow for better portion control by decreasing Ghrelin (hunger hormone).      Recommend to time your carb consumption: Minimizing meals high in complex carbs close to bedtime, as they can interfere with sleep as well as potentially negatively impact your metabolism and promote weight gain. Consistently with protein intake throughout the day can help with satiety, muscle repair/growth and improve metabolism     Recommend adequate hydration which is at least half your body weight in ounces to help control cravings (decreasing confusion for appetite vs water deprivation) and to promote nutritional support as the human body is made of 50-65% of water.     Hypothyroidism  Continue levothyroxine  PCOS (polycystic ovarian syndrome)  Prediabetes   A1c 6   Continue with metformin which can help with insulin resistance and decreasing adiposity will also help  "which I recommend starting Zepbound in conjunction with lifestyle modifications         Return in about 5 weeks (around 2025) for followup .       Most recent notes, labs and records were reviewed.      ______________________________________________________________________        Subjective:     Chief Complaint   Patient presents with    Follow-up     MWM F/u; Waist 47in       HPI: Estefany Rangel  is a 39 y.o. female with class III obesity, hypothyroidism, GERD, prediabetes, PCOS presents to clinic presents to the clinic to pursue weight loss management.  Patient reports that since she was just denied for Zepbound 15 mg, she has had a significant increase in weight as well as decreased energy levels and lack of motivation    Prior attempts: Patient was on Zepbound 15mg, weight watchers for 1 year  (gained 40 lbs) , macro counting on my fitnesspal (6 months ago 10-15lbs)  and has done lose it.     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   Gastrointestinal:  Negative for abdominal pain, and vomiting.   Skin:  Negative for pallor.   Psychiatric/Behavioral:  Negative for agitation, behavioral problems, confusion, dysphoric mood and hallucinations.    All other systems reviewed and are negative.     Objective:  /82 (BP Location: Left arm, Patient Position: Sitting)   Pulse 86   Temp 98.7 °F (37.1 °C) (Tympanic)   Resp 17   Ht 5' 5\" (1.651 m)   Wt 117 kg (258 lb)   LMP 2024   BMI 42.93 kg/m² "     Wt Readings from Last 20 Encounters:   01/15/25 117 kg (258 lb)   01/06/25 117 kg (258 lb 3.2 oz)   07/30/24 122 kg (270 lb)   06/05/24 129 kg (284 lb)   02/05/24 127 kg (280 lb)   01/31/24 131 kg (289 lb 3.2 oz)   01/05/24 133 kg (293 lb 3.2 oz)   01/02/24 128 kg (283 lb)   08/08/23 130 kg (287 lb)   07/25/23 130 kg (286 lb 9.6 oz)   10/06/22 127 kg (280 lb 4.8 oz)   07/20/22 129 kg (283 lb 9.6 oz)   04/28/22 133 kg (293 lb 14.4 oz)   03/22/22 (!) 137 kg (303 lb)   09/28/20 133 kg (294 lb)   06/23/20 132 kg (291 lb 9.6 oz)   05/12/20 135 kg (298 lb 9.6 oz)   03/03/20 134 kg (296 lb 8 oz)   10/07/19 131 kg (287 lb 14.4 oz)   08/05/19 129 kg (285 lb)       Physical Exam  Constitutional:       General: No acute distress.   HENT:      Head: Normocephalic and atraumatic.   Eyes:      Extraocular Movements: Extraocular movements intact.      Conjunctiva/sclera: Conjunctivae normal.      Pupils: Pupils are equal, round, and reactive to light.   Cardiovascular:      Rate and Rhythm: Normal rate and rhythm.   Pulmonary:      Effort: Pulmonary effort is normal. No evidence of labored breathing   Neurological:      General: No focal deficit present.      Mental Status: Alert and oriented to person, place, and time. Mental status is at baseline.   Psychiatric:         Mood and Affect: Mood normal.         Behavior: Behavior normal.     Labs and Imaging  Recent labs and imaging have been personally reviewed.    Lab Results   Component Value Date    WBC 10.93 (H) 11/26/2024    HGB 15.4 11/26/2024    HCT 46.0 11/26/2024    MCV 87 11/26/2024     11/26/2024       Lab Results   Component Value Date    SODIUM 136 11/26/2024    K 3.6 11/26/2024     11/26/2024    CO2 26 11/26/2024    AGAP 7 11/26/2024    BUN 18 11/26/2024    CREATININE 0.80 11/26/2024    GLUC 104 11/26/2024    GLUF 77 08/02/2024    CALCIUM 9.3 11/26/2024    AST 11 (L) 11/26/2024    ALT 10 11/26/2024    ALKPHOS 59 11/26/2024    TP 7.6 11/26/2024     TBILI 0.71 11/26/2024    EGFR 93 11/26/2024       Lab Results   Component Value Date    HGBA1C 5.3 01/31/2024       Lab Results   Component Value Date    PCH8OCVILOCF 6.423 (H) 08/02/2024    TSH 1.38 03/01/2018       Lab Results   Component Value Date    CHOLESTEROL 217 (H) 08/02/2024       Lab Results   Component Value Date    HDL 45 (L) 08/02/2024       Lab Results   Component Value Date    TRIG 361 (H) 08/02/2024       Lab Results   Component Value Date    LDLCALC 100 08/02/2024

## 2025-01-15 ENCOUNTER — OFFICE VISIT (OUTPATIENT)
Dept: BARIATRICS | Facility: CLINIC | Age: 40
End: 2025-01-15
Payer: COMMERCIAL

## 2025-01-15 VITALS
HEIGHT: 65 IN | RESPIRATION RATE: 17 BRPM | TEMPERATURE: 98.7 F | DIASTOLIC BLOOD PRESSURE: 82 MMHG | HEART RATE: 86 BPM | SYSTOLIC BLOOD PRESSURE: 132 MMHG | BODY MASS INDEX: 42.99 KG/M2 | WEIGHT: 258 LBS

## 2025-01-15 DIAGNOSIS — E66.813 CLASS 3 OBESITY: Primary | ICD-10-CM

## 2025-01-15 DIAGNOSIS — E03.9 HYPOTHYROIDISM: ICD-10-CM

## 2025-01-15 DIAGNOSIS — E28.2 PCOS (POLYCYSTIC OVARIAN SYNDROME): ICD-10-CM

## 2025-01-15 PROCEDURE — 99203 OFFICE O/P NEW LOW 30 MIN: CPT | Performed by: STUDENT IN AN ORGANIZED HEALTH CARE EDUCATION/TRAINING PROGRAM

## 2025-01-15 NOTE — Clinical Note
Prior Auth for Zepbound, patient was getting from her primary but was denied in October.  She does have a BMI greater than 40 and included lifestyle modification and management which can help for approval

## 2025-01-16 ENCOUNTER — TELEPHONE (OUTPATIENT)
Dept: BARIATRICS | Facility: CLINIC | Age: 40
End: 2025-01-16

## 2025-01-16 DIAGNOSIS — E66.813 CLASS 3 OBESITY: ICD-10-CM

## 2025-01-16 DIAGNOSIS — E28.2 PCOS (POLYCYSTIC OVARIAN SYNDROME): ICD-10-CM

## 2025-01-16 DIAGNOSIS — E03.9 HYPOTHYROIDISM, UNSPECIFIED TYPE: ICD-10-CM

## 2025-01-16 RX ORDER — TIRZEPATIDE 2.5 MG/.5ML
2.5 INJECTION, SOLUTION SUBCUTANEOUS WEEKLY
Qty: 2 ML | Refills: 0 | Status: SHIPPED | OUTPATIENT
Start: 2025-01-16 | End: 2025-02-13

## 2025-01-18 DIAGNOSIS — R10.9 ABDOMINAL PAIN: ICD-10-CM

## 2025-01-20 RX ORDER — DICYCLOMINE HCL 20 MG
20 TABLET ORAL 2 TIMES DAILY
Qty: 180 TABLET | Refills: 1 | Status: SHIPPED | OUTPATIENT
Start: 2025-01-20

## 2025-01-23 ENCOUNTER — TELEPHONE (OUTPATIENT)
Dept: BARIATRICS | Facility: CLINIC | Age: 40
End: 2025-01-23

## 2025-01-23 NOTE — TELEPHONE ENCOUNTER
PA for zepbound2.5mg DENIED    Reason:(Screenshot if applicable)    Pt needs proof of   Diet  Exercise  Notes/receipts for 6 months     Message sent to office clinical pool No      Denial letter scanned into Media Yes    Appeal started No (Provider will need to decide if appeal is warranted and send clinical documentation to Prior Authorization Team for initiation.)    **Please follow up with your patient regarding denial and next steps**

## 2025-01-24 DIAGNOSIS — F41.9 ANXIETY: ICD-10-CM

## 2025-01-24 DIAGNOSIS — E03.8 OTHER SPECIFIED HYPOTHYROIDISM: ICD-10-CM

## 2025-01-24 RX ORDER — LEVOTHYROXINE SODIUM 137 UG/1
137 TABLET ORAL
Qty: 90 TABLET | Refills: 1 | Status: SHIPPED | OUTPATIENT
Start: 2025-01-24

## 2025-01-24 RX ORDER — CITALOPRAM HYDROBROMIDE 40 MG/1
40 TABLET ORAL DAILY
Qty: 90 TABLET | Refills: 1 | Status: SHIPPED | OUTPATIENT
Start: 2025-01-24

## 2025-01-31 DIAGNOSIS — E03.9 HYPOTHYROIDISM, UNSPECIFIED TYPE: ICD-10-CM

## 2025-01-31 DIAGNOSIS — E28.2 PCOS (POLYCYSTIC OVARIAN SYNDROME): ICD-10-CM

## 2025-01-31 DIAGNOSIS — E66.813 CLASS 3 OBESITY: ICD-10-CM

## 2025-01-31 RX ORDER — TIRZEPATIDE 2.5 MG/.5ML
2.5 INJECTION, SOLUTION SUBCUTANEOUS WEEKLY
Refills: 0 | OUTPATIENT
Start: 2025-01-31 | End: 2025-02-28

## 2025-02-10 DIAGNOSIS — E66.01 CLASS 3 SEVERE OBESITY WITH SERIOUS COMORBIDITY AND BODY MASS INDEX (BMI) OF 40.0 TO 44.9 IN ADULT, UNSPECIFIED OBESITY TYPE (HCC): Primary | ICD-10-CM

## 2025-02-10 DIAGNOSIS — E66.813 CLASS 3 SEVERE OBESITY WITH SERIOUS COMORBIDITY AND BODY MASS INDEX (BMI) OF 40.0 TO 44.9 IN ADULT, UNSPECIFIED OBESITY TYPE (HCC): Primary | ICD-10-CM

## 2025-02-10 RX ORDER — PHENTERMINE HYDROCHLORIDE 15 MG/1
15 CAPSULE ORAL EVERY MORNING
Qty: 90 CAPSULE | Refills: 1 | Status: SHIPPED | OUTPATIENT
Start: 2025-02-10 | End: 2025-02-11 | Stop reason: SDUPTHER

## 2025-02-10 NOTE — PROGRESS NOTES
Discussed oral medications via MyChart.  Patient would like to try another alternative as weight loss injectables not covered at this time    Trial phentermine 15 mg daily to take in a.m.

## 2025-02-11 RX ORDER — PHENTERMINE HYDROCHLORIDE 15 MG/1
15 CAPSULE ORAL EVERY MORNING
Qty: 30 CAPSULE | Refills: 1 | Status: SHIPPED | OUTPATIENT
Start: 2025-02-11

## 2025-02-17 ENCOUNTER — OFFICE VISIT (OUTPATIENT)
Dept: BARIATRICS | Facility: CLINIC | Age: 40
End: 2025-02-17
Payer: COMMERCIAL

## 2025-02-17 VITALS
HEIGHT: 65 IN | WEIGHT: 261 LBS | BODY MASS INDEX: 43.49 KG/M2 | SYSTOLIC BLOOD PRESSURE: 120 MMHG | DIASTOLIC BLOOD PRESSURE: 76 MMHG | TEMPERATURE: 97.9 F | RESPIRATION RATE: 16 BRPM | HEART RATE: 89 BPM

## 2025-02-17 DIAGNOSIS — E66.01 CLASS 3 SEVERE OBESITY DUE TO EXCESS CALORIES WITH BODY MASS INDEX (BMI) OF 40.0 TO 44.9 IN ADULT (HCC): Primary | ICD-10-CM

## 2025-02-17 DIAGNOSIS — E66.813 CLASS 3 SEVERE OBESITY DUE TO EXCESS CALORIES WITH BODY MASS INDEX (BMI) OF 40.0 TO 44.9 IN ADULT (HCC): Primary | ICD-10-CM

## 2025-02-17 PROCEDURE — 99213 OFFICE O/P EST LOW 20 MIN: CPT | Performed by: STUDENT IN AN ORGANIZED HEALTH CARE EDUCATION/TRAINING PROGRAM

## 2025-02-17 NOTE — PROGRESS NOTES
Assessment & Plan  Class 3 Obesity  Initial weight was 303: 4/20/2022   Current weight: 261     Patient has been food logging at home and improved her protein intake as well as meal frequency.  She states that she struggles with sweet cravings.  Patient was started on phentermine 15 mg as Zepbound is no longer covered.  She was previously on Zepbound 15 mg.  Patient states that she will get the medication this week    Encouraged adequate amount of fluids and protein throughout the day to help promote weight loss. Recommend practicing with frequent meals/snacks (3 meals 2-3 snacks daily) as it can potentially improve metabolism and allow for better portion control by decreasing Ghrelin (hunger hormone)     Mindful Eating and Drinking is necessary to promote healthy behaviors that can lead to decreased adipose tissue which can be curative and preventative for comorbidities such as hypertension, diabetes, anxiety, depression, osteoarthritis, dyslipidemia, asthma, liver disease, cardiovascular disease, stroke, sleep apnea and cancers.      Fluid intake which is at least half your body weight in ounces is necessary to help control cravings (decreasing confusion for appetite vs water deprivation) as the human body is made up of 50-70% of Fluids. If there is a diversion for water alone, would recommend flavored water (example-splash of lemonade or ice tea) to help promote compliance. Fluids include Teas, water, flavored water, seltzer water, coffee, shakes     Protein is necessary to promote satiety, metabolism, and muscle growth/repair. Increased energy expenditure is used for the processes of breaking down and rebuilding proteins within the body when eating meals that are protein based      Carbohydrates are essential as it is the body's main fuel source for daily activities.  Recommend that carbohydrates be consumed mostly during the times you are more active during the day      Fats: Essential vitamins like A, D, and  E, protect your organs, support cell growth, and contribute to maintaining healthy cholesterol levels      Metabolism:  Metabolism can also be promoted by nutrition, meal frequency and increased muscle mass     Daily Calorie Needs: are individualized and will need to take into account any fluid losses, increased activity levels which may need to be adjusted daily. Recommended to not skip any meals even if there is a lack of appetite as it can be suppressed by caffeine or any prior heavy meal due to Leptin (satiety hormone).  Calorie and fluid intake will need to be increased if there is any increased activity during the day compared to previous days.  With proper fluid and macronutrient intake throughout the day, portion control and decreased cravings will improve     Weight check: BMI and weight serve as only guidelines as it is not factor in muscle mass, fat, water. Weights can fluctuate depending on fluid shifts vs what foods are consumed prior to checking your weight.      Return in about 3 months (around 5/17/2025) for followup .     Most recent notes, labs and previous medical records were reviewed.       ______________________________________________________________________        Subjective:     Chief Complaint   Patient presents with    Follow-up     Mwm f/u 5wk: waist: 51in       HPI:   39 y.o. female with class III obesity, hypothyroidism, GERD, prediabetes, PCOS presents to clinic presents to the clinic for followup            Current Medication: Plans to start phentermine 15mg     Prior attempts: Patient was on Zepbound 15mg, weight watchers for 1 year 2012 (gained 40 lbs) , macro counting on my fitnesspal (6 months ago 10-15lbs)  and has done lose it.      Previous Dietary Regimen:  Breakfast:  bagel butter coffee           Lunch: Leftovers, soup chicken and vegetables, salads with chicken   Dinner: Chicken, vegetables, salads   Cravings: Sweets   Beverages: Coffee, 40 oz a day       Dietary  "Regimen:  Breakfast: Eggs, greek yogurt            Lunch: Protein shakes, leftovers chicken salads   Dinner: Chicken, vegetables.   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    All other systems reviewed and are negative.     Objective:  /76   Pulse 89   Temp 97.9 °F (36.6 °C)   Resp 16   Ht 5' 5\" (1.651 m)   Wt 118 kg (261 lb)   LMP 02/15/2025 (Exact Date)   BMI 43.43 kg/m²     Wt Readings from Last 30 Encounters:   25 118 kg (261 lb)   01/15/25 117 kg (258 lb)   25 117 kg (258 lb 3.2 oz)   24 122 kg (270 lb)   24 129 kg (284 lb)   24 127 kg (280 lb)   24 131 kg (289 lb 3.2 oz)   24 133 kg (293 lb 3.2 oz)   24 128 kg (283 lb)   23 130 kg (287 lb)   23 130 kg (286 lb 9.6 oz)   10/06/22 127 kg (280 lb 4.8 oz)   22 129 kg (283 lb 9.6 oz)   22 133 kg (293 lb 14.4 oz)   22 (!) 137 kg (303 lb)   20 133 kg (294 lb)   20 132 kg (291 lb 9.6 oz)   20 135 kg (298 lb 9.6 oz)   20 134 kg (296 lb 8 oz)   10/07/19 131 kg (287 lb 14.4 oz)   19 129 kg (285 lb)   19 133 kg (293 lb 3.2 oz)   18 130 kg (286 lb)   17 125 kg (274 lb 8.9 oz)   16 128 kg (282 lb 0.9 oz)   07/21/15 124 kg (274 lb 0.9 oz)   14 126 kg (277 lb 8 oz)   13 113 kg (249 lb 8 oz)   13 116 kg (255 lb 7 oz)       Physical Exam  Constitutional:       " General: No acute distress.  Well-nourished  HENT:      Head: Normocephalic and atraumatic.   Eyes:      Extraocular Movements: Extraocular movements intact.      Conjunctiva/pupils: Conjunctivae normal. Pupils are equal, round  Pulmonary:      Effort: Pulmonary effort is normal. No labored breathing   Neurological:      General: No focal deficit present.  AO x 3     Mental Status: Alert and oriented to person, place, and time. Mental status is at baseline.   Psychiatric:         Mood and Affect: Mood normal.         Behavior: Behavior normal.     Labs and Imaging  Recent labs and imaging have been personally reviewed.

## 2025-04-22 ENCOUNTER — PROCEDURE VISIT (OUTPATIENT)
Dept: OBGYN CLINIC | Facility: CLINIC | Age: 40
End: 2025-04-22
Payer: COMMERCIAL

## 2025-04-22 VITALS
WEIGHT: 259.8 LBS | DIASTOLIC BLOOD PRESSURE: 96 MMHG | SYSTOLIC BLOOD PRESSURE: 128 MMHG | BODY MASS INDEX: 43.28 KG/M2 | HEIGHT: 65 IN

## 2025-04-22 DIAGNOSIS — N93.9 ABNORMAL UTERINE BLEEDING (AUB): Primary | ICD-10-CM

## 2025-04-22 PROCEDURE — 99213 OFFICE O/P EST LOW 20 MIN: CPT | Performed by: NURSE PRACTITIONER

## 2025-04-22 NOTE — PROGRESS NOTES
"Name: Estefany Rangel      : 1985      MRN: [id number]  Encounter Provider: FLORENTINO Hoffman  Encounter Date: 2025   Encounter department: [facility]  :  Assessment & Plan  Abnormal uterine bleeding (AUB)  Reviewed recommendation for endometrial biopsy, attempted at today's visit.  Procedure was stopped due to patient discomfort.  If EMB is necessary would recommend premedication  TSH ordered with reflex  Pelvic ultrasound ordered  Patient encouraged to keep menstrual calendar  Will call/[employer] message with results and recommendations  Orders:    US pelvis complete w transvaginal; Future    TSH, 3rd generation with Free T4 reflex; Future      RTO 2026 for annual exam or sooner as necessary  History of Present Illness   HPI  Estefany Rangel is a 40 y.o. female who presents with irregular vaginal bleeding. LMP 25- normal per patient.  In March had a normal menstrual cycle that lasted about 4 to 5 days.  Then started bleeding again around the  and had spotting on and off until most recent menstrual cycle.  Also noticed jellylike clots which was abnormal for her. Known PCOS.     Last pap smear 25 NILM / HR HPV neg     History obtained from: patient    Review of Systems   Constitutional:  Negative for chills and fever.   Respiratory: Negative.     Cardiovascular: Negative.    Genitourinary: Negative.      Medical History Reviewed by provider this encounter:  Problems     .     Objective   /96   Ht 5' 5\" (1.651 m)   Wt 118 kg (259 lb 12.8 oz)   LMP 2025   BMI 43.23 kg/m²      Physical Exam  Vitals reviewed.   Constitutional:       Appearance: Normal appearance. She is obese.   Neurological:      Mental Status: She is alert and oriented to person, place, and time.   Psychiatric:         Behavior: Behavior normal.           "

## 2025-04-24 DIAGNOSIS — E03.8 OTHER SPECIFIED HYPOTHYROIDISM: ICD-10-CM

## 2025-04-24 DIAGNOSIS — E66.813 CLASS 3 OBESITY: ICD-10-CM

## 2025-04-24 DIAGNOSIS — R73.03 PREDIABETES: Primary | ICD-10-CM

## 2025-04-24 RX ORDER — LEVOTHYROXINE SODIUM 137 UG/1
137 TABLET ORAL
Qty: 90 TABLET | Refills: 1 | Status: SHIPPED | OUTPATIENT
Start: 2025-04-24

## 2025-04-24 RX ORDER — BUPROPION HYDROCHLORIDE 150 MG/1
150 TABLET ORAL DAILY
Qty: 90 TABLET | Refills: 0 | Status: SHIPPED | OUTPATIENT
Start: 2025-04-24

## 2025-04-24 RX ORDER — NALTREXONE HYDROCHLORIDE 50 MG/1
TABLET, FILM COATED ORAL
Qty: 30 TABLET | Refills: 1 | Status: SHIPPED | OUTPATIENT
Start: 2025-04-24 | End: 2025-04-24 | Stop reason: SDUPTHER

## 2025-04-24 RX ORDER — NALTREXONE HYDROCHLORIDE 50 MG/1
TABLET, FILM COATED ORAL
Qty: 90 TABLET | Refills: 0 | Status: SHIPPED | OUTPATIENT
Start: 2025-04-24

## 2025-04-24 RX ORDER — BUPROPION HYDROCHLORIDE 150 MG/1
150 TABLET ORAL DAILY
Qty: 30 TABLET | Refills: 1 | Status: SHIPPED | OUTPATIENT
Start: 2025-04-24 | End: 2025-04-24 | Stop reason: SDUPTHER

## 2025-04-27 ENCOUNTER — OFFICE VISIT (OUTPATIENT)
Dept: URGENT CARE | Age: 40
End: 2025-04-27
Payer: COMMERCIAL

## 2025-04-27 VITALS
RESPIRATION RATE: 16 BRPM | HEIGHT: 65 IN | SYSTOLIC BLOOD PRESSURE: 128 MMHG | WEIGHT: 258.8 LBS | DIASTOLIC BLOOD PRESSURE: 90 MMHG | HEART RATE: 94 BPM | OXYGEN SATURATION: 96 % | TEMPERATURE: 98.3 F | BODY MASS INDEX: 43.12 KG/M2

## 2025-04-27 DIAGNOSIS — J40 BRONCHITIS: ICD-10-CM

## 2025-04-27 DIAGNOSIS — J02.9 ACUTE PHARYNGITIS, UNSPECIFIED ETIOLOGY: ICD-10-CM

## 2025-04-27 DIAGNOSIS — J01.00 ACUTE MAXILLARY SINUSITIS, RECURRENCE NOT SPECIFIED: Primary | ICD-10-CM

## 2025-04-27 PROCEDURE — 99213 OFFICE O/P EST LOW 20 MIN: CPT | Performed by: PHYSICIAN ASSISTANT

## 2025-04-27 RX ORDER — BENZONATATE 100 MG/1
100 CAPSULE ORAL 3 TIMES DAILY PRN
Qty: 20 CAPSULE | Refills: 0 | Status: SHIPPED | OUTPATIENT
Start: 2025-04-27

## 2025-04-27 RX ORDER — FLUTICASONE PROPIONATE 50 MCG
1 SPRAY, SUSPENSION (ML) NASAL DAILY
Qty: 9.9 ML | Refills: 0 | Status: SHIPPED | OUTPATIENT
Start: 2025-04-27

## 2025-04-27 NOTE — PROGRESS NOTES
"[facility]        NAME: Estefany Rangel is a 40 y.o. female  : 1985    MRN: [id number]  DATE: 2025  TIME: 10:54 AM    /90 (BP Location: Right arm, Patient Position: Sitting, Cuff Size: Standard)   Pulse 94   Temp 98.3 °F (36.8 °C) (Tympanic)   Resp 16   Ht 5' 5\" (1.651 m)   Wt 117 kg (258 lb 12.8 oz)   LMP 2025   SpO2 96%   BMI 43.07 kg/m²     Assessment and Plan   Acute maxillary sinusitis, recurrence not specified [J01.00]  1. Acute maxillary sinusitis, recurrence not specified  amoxicillin-clavulanate (AUGMENTIN) 875-125 mg per tablet    benzonatate (TESSALON PERLES) 100 mg capsule    fluticasone (FLONASE) 50 mcg/act nasal spray      2. Acute pharyngitis, unspecified etiology  amoxicillin-clavulanate (AUGMENTIN) 875-125 mg per tablet    benzonatate (TESSALON PERLES) 100 mg capsule    fluticasone (FLONASE) 50 mcg/act nasal spray      3. Bronchitis  amoxicillin-clavulanate (AUGMENTIN) 875-125 mg per tablet    benzonatate (TESSALON PERLES) 100 mg capsule    fluticasone (FLONASE) 50 mcg/act nasal spray            Patient Instructions       Follow up with PCP in 3-5 days.  Proceed to  ER if symptoms worsen.    Chief Complaint     Chief Complaint   Patient presents with    Nasal Congestion     Cough, congestion, sore throat, facial pressure, runny nose, teeth hurt, sneezing. Hard time sleeping with the cough. Pt suspects sinus infection. Sx started Friday. Sinus decongestant, Excedrin, OTC, somewhat effective         History of Present Illness       Pt with several days cough and congestion sore throat and slight producitve cough         Review of Systems   Review of Systems   Constitutional: Negative.    HENT:  Positive for congestion, sinus pressure, sinus pain and sore throat.    Eyes: Negative.    Respiratory: Negative.     Cardiovascular: Negative.    Gastrointestinal: Negative.    Endocrine: Negative.    Genitourinary: Negative.    Musculoskeletal: Negative.  "   Skin: Negative.    Allergic/Immunologic: Negative.    Neurological: Negative.    Hematological: Negative.    Psychiatric/Behavioral: Negative.     All other systems reviewed and are negative.        Current Medications       Current Outpatient Medications:     amoxicillin-clavulanate (AUGMENTIN) 875-125 mg per tablet, Take 1 tablet by mouth every 12 (twelve) hours for 10 days, Disp: 20 tablet, Rfl: 0    benzonatate (TESSALON PERLES) 100 mg capsule, Take 1 capsule (100 mg total) by mouth 3 (three) times a day as needed for cough, Disp: 20 capsule, Rfl: 0    buPROPion (Wellbutrin XL) 150 mg 24 hr tablet, Take 1 tablet (150 mg total) by mouth daily, Disp: 90 tablet, Rfl: 0    cetirizine (ZyrTEC) 10 mg tablet, Take 10 mg by mouth daily, Disp: , Rfl:     citalopram (CeleXA) 40 mg tablet, Take 1 tablet (40 mg total) by mouth in the morning, Disp: 90 tablet, Rfl: 1    citalopram (CeleXA) 40 mg tablet, Take 1 tablet (40 mg total) by mouth in the morning, Disp: 90 tablet, Rfl: 1    dicyclomine (BENTYL) 20 mg tablet, Take 1 tablet (20 mg total) by mouth 2 (two) times a day, Disp: 180 tablet, Rfl: 1    fluticasone (FLONASE) 50 mcg/act nasal spray, 1 spray into each nostril daily, Disp: 9.9 mL, Rfl: 0    levothyroxine 137 mcg tablet, Take 1 tablet (137 mcg total) by mouth daily in the early morning, Disp: 90 tablet, Rfl: 1    levothyroxine 137 mcg tablet, Take 1 tablet (137 mcg total) by mouth daily in the early morning, Disp: 90 tablet, Rfl: 1    metFORMIN (GLUCOPHAGE-XR) 500 mg 24 hr tablet, TAKE 2 PILL IN THE MORNING AND 2 PILLS AT NIGHT., Disp: 360 tablet, Rfl: 1    naltrexone (REVIA) 50 mg tablet, Take 1/2 tablet a day for 1 week and then take 1 tablet daily.  Start 2 weeks after bupropion, Disp: 90 tablet, Rfl: 0    omeprazole (PriLOSEC) 20 mg delayed release capsule, Take 1 capsule (20 mg total) by mouth daily, Disp: 90 capsule, Rfl: 1    Current Allergies     Allergies as of 04/27/2025 - Reviewed 04/27/2025   Allergen  "Reaction Noted    Erythromycin Rash 07/10/2012            The following portions of the patient's history were reviewed and updated as appropriate: allergies, current medications, past family history, past medical history, past social history, past surgical history and problem list.     Past Medical History:   Diagnosis Date    Allergic     Anxiety     BMI 45.0-49.9, adult (HCC)     Disease of thyroid gland     Headache(784.0)     Herniated disc, cervical     Hypertension        Past Surgical History:   Procedure Laterality Date    BREAST SURGERY      reduction    EYE SURGERY  4/2015    Lasik    TONSILLECTOMY         Family History   Problem Relation Age of Onset    Hyperlipidemia Mother     Hypertension Mother     Asthma Mother     Hyperlipidemia Father     Hypertension Father     Hyperlipidemia Brother     Ovarian cancer Maternal Grandmother     Cancer Maternal Grandmother     Hyperlipidemia Maternal Grandfather     Hypertension Maternal Grandfather     Diabetes Maternal Grandfather     Heart failure Paternal Grandmother     Diabetes Paternal Grandmother     Hypertension Paternal Grandfather     Diabetes Paternal Grandfather     Breast cancer Neg Hx     Colon cancer Neg Hx          Medications have been verified.        Objective   /90 (BP Location: Right arm, Patient Position: Sitting, Cuff Size: Standard)   Pulse 94   Temp 98.3 °F (36.8 °C) (Tympanic)   Resp 16   Ht 5' 5\" (1.651 m)   Wt 117 kg (258 lb 12.8 oz)   LMP 04/17/2025   SpO2 96%   BMI 43.07 kg/m²        Physical Exam     Physical Exam  Vitals and nursing note reviewed.   Constitutional:       Appearance: Normal appearance. She is normal weight.   HENT:      Head: Normocephalic and atraumatic.      Right Ear: Tympanic membrane, ear canal and external ear normal.      Left Ear: Tympanic membrane, ear canal and external ear normal.      Nose: Congestion and rhinorrhea present.      Comments: Boggy mucosa      Mouth/Throat:      Mouth: Mucous " membranes are moist.      Pharynx: Oropharynx is clear. Posterior oropharyngeal erythema present.   Eyes:      Conjunctiva/sclera: Conjunctivae normal.      Pupils: Pupils are equal, round, and reactive to light.   Cardiovascular:      Rate and Rhythm: Normal rate and regular rhythm.      Pulses: Normal pulses.      Heart sounds: Normal heart sounds.   Pulmonary:      Effort: Pulmonary effort is normal.      Breath sounds: Normal breath sounds.   Abdominal:      General: Bowel sounds are normal.      Palpations: Abdomen is soft.   Musculoskeletal:         General: Normal range of motion.      Cervical back: Normal range of motion and neck supple.   Lymphadenopathy:      Cervical: Cervical adenopathy present.   Skin:     General: Skin is warm.      Capillary Refill: Capillary refill takes less than 2 seconds.   Neurological:      Mental Status: She is alert and oriented to person, place, and time.   Psychiatric:         Mood and Affect: Mood normal.

## 2025-04-27 NOTE — LETTER
April 27, 2025     Patient: Estefany Rangel   YOB: 1985   Date of Visit: 4/27/2025       To Whom It May Concern:    It is my medical opinion that Estefany Rangel may return to work on 4/29/2025 .    If you have any questions or concerns, please don't hesitate to call.         Sincerely,        Ghassan Stephen Jr, EDUARDO    CC: No Recipients

## 2025-06-02 ENCOUNTER — OFFICE VISIT (OUTPATIENT)
Dept: BARIATRICS | Facility: CLINIC | Age: 40
End: 2025-06-02
Payer: COMMERCIAL

## 2025-06-02 VITALS
HEIGHT: 65 IN | BODY MASS INDEX: 43.95 KG/M2 | TEMPERATURE: 97.8 F | DIASTOLIC BLOOD PRESSURE: 86 MMHG | WEIGHT: 263.8 LBS | SYSTOLIC BLOOD PRESSURE: 128 MMHG | HEART RATE: 100 BPM | RESPIRATION RATE: 16 BRPM

## 2025-06-02 DIAGNOSIS — R73.03 PREDIABETES: ICD-10-CM

## 2025-06-02 DIAGNOSIS — E03.9 HYPOTHYROIDISM: ICD-10-CM

## 2025-06-02 DIAGNOSIS — E28.2 PCOS (POLYCYSTIC OVARIAN SYNDROME): ICD-10-CM

## 2025-06-02 DIAGNOSIS — E66.813 CLASS 3 OBESITY: Primary | ICD-10-CM

## 2025-06-02 PROCEDURE — 99213 OFFICE O/P EST LOW 20 MIN: CPT | Performed by: STUDENT IN AN ORGANIZED HEALTH CARE EDUCATION/TRAINING PROGRAM

## 2025-06-02 RX ORDER — TIRZEPATIDE 2.5 MG/.5ML
2.5 INJECTION, SOLUTION SUBCUTANEOUS WEEKLY
Qty: 2 ML | Refills: 0 | Status: SHIPPED | OUTPATIENT
Start: 2025-06-02 | End: 2025-06-30

## 2025-06-02 NOTE — Clinical Note
Prior auth for zepbound, was on it before, but she waited to June to redo the appeal or prior auth. She will also send in stuff regards to exercise via [employer] tomorrow if needed

## 2025-06-02 NOTE — PROGRESS NOTES
Assessment & Plan  Class 3 obesity    Initial weight: 303: 4/20/2022   Previous weight: 261   Current weight: 263      Medication: wellbutrin 150, naltrexone    Prescribed  Zepbound 2.5mg       Patient has a BMI greater than 40 associated with co-morbidities such as class III obesity, hypothyroidism, GERD, prediabetes, PCOS    It is medically necessary for approval for zepbound to decrease complications in relation to her comorbidites   Patient has tried to maintain healthy dietary changes for greater than 5 years with exercise, multiple oral medications such as phentermine, Wellbutrin and naltrexone. She also gets over 8000k steps a day and is enrolled in a gym. Walks 4-5 times a week and also eats healthy.       Patient understands the importance of making lifestyle changes as recommended below to aid in weight loss.      Dietary Recommendations:  Recommend to avoid skipping any meals. Adequate amount of Macronutrients (minimal 3 meals a day) is necessary to help improve metabolism, satiety and allow for better portion control by decreasing Ghrelin (hunger hormone). Lack of hunger can be suppressed by a hormone called Leptin (full hormone) which can occur from a previous meal or caffeine intake    Protein intake throughout the day can help promote satiety and is necessary for muscle growth/repair    Carbohydrates are essential as it is the vital source of fuel for daily activities. energy, cell function, nutrient absorption, and hormone production.     Fats: Essential vitamins like A, D, and E, support cell growth, function and are necessary for nutrient absorption to support your organs     Fluid intake which is at least half your body weight in ounces is necessary to help control cravings (decreasing confusion for appetite vs water deprivation) as the human body is made up of 50-70% of Fluids. If there is a diversion for water alone, would recommend flavored water (example-splash of lemonade or ice tea) to  help promote compliance. Fluids include Teas, water, flavored water, seltzer water, coffee, shakes    Metabolism:    Metabolism can also be promoted by macronutrient intake and increased muscle weight via thermogenesis      Daily Calorie Needs: Recommend to take into account any fluid losses and calories burned via increased activity levels as daily calories may need to be adjusted     Weight check: Weights can fluctuate depending on fluid shifts vs what foods are consumed prior to checking your weight          Return in about 4 months (around 2025) for followup .     Most recent notes, labs and previous medical records were reviewed. Total time with chart review and with the patient: 35 min      ______________________________________________________________________        Subjective:     Chief Complaint   Patient presents with    Follow-up     MWM-4M F/u; Waist-50in       HPI: 40 y.o. female with pmh of class III obesity, hypothyroidism, GERD, prediabetes, PCOS presents to clinic presents to the clinic for followup          Wt Loss History:   -following with weight  management since     -She was previously on Zepbound 15 mg   -previous was on phentermine     Medication: wellbutrin 150 mg, naltrexone    Dietary Regimen:  Breakfast: Eggs, greek yogurt            Lunch: Protein shakes, leftovers chicken salads   Dinner: Chicken, vegetables.   Snacks:  Protein shakes    Fluids: 40oz x2   Food log: Patient is logging all her meals      Physical Activity Intake:  Walking , at home workouts  and workouts at school  Frequency: 1-2 times a week     Duration: 30-45 min     Occupation: ;  [employer]-8k steps daily     Membership: 4-5 walking times, [facility] fitness gym, strength training     Review Of Systems:  General: No pallor  Pulmonary: Negative for shortness of breath  Chest: negative for chest pain  Gastrointestinal:  Negative for vomiting  "  Psychiatric/Behavioral:  Negative for behavioral problems, confusion, dysphoric mood and hallucinations.    All other systems reviewed and are negative.     Objective:  /86   Pulse 100   Temp 97.8 °F (36.6 °C)   Resp 16   Ht 5' 5\" (1.651 m)   Wt 120 kg (263 lb 12.8 oz)   BMI 43.90 kg/m²     Wt Readings from Last 30 Encounters:   06/02/25 120 kg (263 lb 12.8 oz)   04/27/25 117 kg (258 lb 12.8 oz)   04/22/25 118 kg (259 lb 12.8 oz)   02/17/25 118 kg (261 lb)   01/15/25 117 kg (258 lb)   01/06/25 117 kg (258 lb 3.2 oz)   07/30/24 122 kg (270 lb)   06/05/24 129 kg (284 lb)   02/05/24 127 kg (280 lb)   01/31/24 131 kg (289 lb 3.2 oz)   01/05/24 133 kg (293 lb 3.2 oz)   01/02/24 128 kg (283 lb)   08/08/23 130 kg (287 lb)   07/25/23 130 kg (286 lb 9.6 oz)   10/06/22 127 kg (280 lb 4.8 oz)   07/20/22 129 kg (283 lb 9.6 oz)   04/28/22 133 kg (293 lb 14.4 oz)   03/22/22 (!) 137 kg (303 lb)   09/28/20 133 kg (294 lb)   06/23/20 132 kg (291 lb 9.6 oz)   05/12/20 135 kg (298 lb 9.6 oz)   03/03/20 134 kg (296 lb 8 oz)   10/07/19 131 kg (287 lb 14.4 oz)   08/05/19 129 kg (285 lb)   06/18/19 133 kg (293 lb 3.2 oz)   08/02/18 130 kg (286 lb)   08/01/17 125 kg (274 lb 8.9 oz)   07/22/16 128 kg (282 lb 0.9 oz)   07/21/15 124 kg (274 lb 0.9 oz)   07/16/14 126 kg (277 lb 8 oz)       Physical Exam  Constitutional:       General: No acute distress.  Well-nourished  HENT:      Head: Normocephalic and atraumatic.   Eyes:      Extraocular Movements: Extraocular movements intact.      Conjunctiva/pupils: Conjunctivae normal. Pupils are equal, round  Pulmonary:      Effort: Pulmonary effort is normal. No labored breathing   Neurological:      General: No focal deficit present.  AO x 3     Mental Status: Alert and oriented to person, place, and time. Mental status is at baseline.   Psychiatric:         Mood and Affect: Mood normal.         Behavior: Behavior normal.     Labs and Imaging  Recent labs and imaging have been " personally reviewed.

## 2025-06-05 ENCOUNTER — TELEPHONE (OUTPATIENT)
Dept: BARIATRICS | Facility: CLINIC | Age: 40
End: 2025-06-05

## 2025-06-05 NOTE — TELEPHONE ENCOUNTER
Simone Rangel      We have submitted an appeal on your behalf to your insurance company for medication zepbound 2.5mg. This process can take up to 30 business days. We will contact you once we receive a response. You can also reach out to your insurance company for a status of this appeal.    Thank you,    [facility] Prior Authorization Team

## 2025-06-06 ENCOUNTER — OFFICE VISIT (OUTPATIENT)
Dept: URGENT CARE | Age: 40
End: 2025-06-06
Payer: COMMERCIAL

## 2025-06-06 VITALS
RESPIRATION RATE: 18 BRPM | TEMPERATURE: 97.8 F | HEIGHT: 65 IN | HEART RATE: 110 BPM | SYSTOLIC BLOOD PRESSURE: 142 MMHG | WEIGHT: 265 LBS | DIASTOLIC BLOOD PRESSURE: 90 MMHG | BODY MASS INDEX: 44.15 KG/M2 | OXYGEN SATURATION: 98 %

## 2025-06-06 DIAGNOSIS — J02.9 SORE THROAT: ICD-10-CM

## 2025-06-06 DIAGNOSIS — J06.9 ACUTE URI: Primary | ICD-10-CM

## 2025-06-06 LAB — S PYO AG THROAT QL: NEGATIVE

## 2025-06-06 PROCEDURE — 87880 STREP A ASSAY W/OPTIC: CPT

## 2025-06-06 PROCEDURE — 99213 OFFICE O/P EST LOW 20 MIN: CPT

## 2025-06-06 RX ORDER — PREDNISONE 20 MG/1
40 TABLET ORAL DAILY
Qty: 8 TABLET | Refills: 0 | Status: SHIPPED | OUTPATIENT
Start: 2025-06-06 | End: 2025-06-10

## 2025-06-06 RX ORDER — BENZONATATE 200 MG/1
200 CAPSULE ORAL 3 TIMES DAILY PRN
Qty: 20 CAPSULE | Refills: 0 | Status: SHIPPED | OUTPATIENT
Start: 2025-06-06

## 2025-06-06 NOTE — PROGRESS NOTES
[facility]        NAME: Estefany Rangel is a 40 y.o. female  : 1985    MRN: [id number]  DATE: 2025  TIME: 2:57 PM    Assessment and Plan   Acute URI [J06.9]  1. Acute URI  amoxicillin-clavulanate (AUGMENTIN) 875-125 mg per tablet    benzonatate (TESSALON) 200 MG capsule    predniSONE 20 mg tablet      2. Sore throat  POCT rapid ANTIGEN strepA            Patient Instructions       Follow up with PCP in 3-5 days.  Proceed to  ER if symptoms worsen.    If tests have been performed at [facility], our office will contact you with results if changes need to be made to the care plan discussed with you at the visit.  [facility]    Chief Complaint     Chief Complaint   Patient presents with    Cough     Patient reports cough, congestion, and sore throat X 1 day.         History of Present Illness       URI symptoms- no fevers. Took Dayquil. Sore throat, cough, congestion. Breath sounds clear. No SOB/wheezing    Cough  Associated symptoms include postnasal drip, rhinorrhea and a sore throat. Pertinent negatives include no fever, shortness of breath or wheezing.       Review of Systems   Review of Systems   Constitutional:  Negative for fever.   HENT:  Positive for congestion, postnasal drip, rhinorrhea and sore throat.    Respiratory:  Positive for cough. Negative for shortness of breath and wheezing.    All other systems reviewed and are negative.        Current Medications     Current Medications[1]    Current Allergies     Allergies as of 2025 - Reviewed 2025   Allergen Reaction Noted    Erythromycin Rash 07/10/2012            The following portions of the patient's history were reviewed and updated as appropriate: allergies, current medications, past family history, past medical history, past social history, past surgical history and problem list.     Past Medical History[2]    Past Surgical History[3]    Family History[4]      Medications  "have been verified.        Objective   /90   Pulse (!) 110   Temp 97.8 °F (36.6 °C)   Resp 18   Ht 5' 5\" (1.651 m)   Wt 120 kg (265 lb)   SpO2 98%   BMI 44.10 kg/m²   No LMP recorded.       Physical Exam     Physical Exam  Vitals reviewed.   Constitutional:       Appearance: Normal appearance.   HENT:      Right Ear: A middle ear effusion is present.      Left Ear: Tympanic membrane normal.      Nose: Congestion and rhinorrhea present.      Mouth/Throat:      Pharynx: Posterior oropharyngeal erythema present.     Cardiovascular:      Rate and Rhythm: Tachycardia present.      Pulses: Normal pulses.      Heart sounds: Normal heart sounds.      Comments: coughing  Pulmonary:      Effort: Pulmonary effort is normal.      Breath sounds: Normal breath sounds.     Musculoskeletal:         General: Normal range of motion.   Lymphadenopathy:      Cervical: No cervical adenopathy.     Skin:     General: Skin is warm and dry.     Neurological:      Mental Status: She is alert.                        [1]   Current Outpatient Medications:     amoxicillin-clavulanate (AUGMENTIN) 875-125 mg per tablet, Take 1 tablet by mouth every 12 (twelve) hours for 7 days, Disp: 14 tablet, Rfl: 0    benzonatate (TESSALON) 200 MG capsule, Take 1 capsule (200 mg total) by mouth 3 (three) times a day as needed for cough, Disp: 20 capsule, Rfl: 0    buPROPion (Wellbutrin XL) 150 mg 24 hr tablet, Take 1 tablet (150 mg total) by mouth daily, Disp: 90 tablet, Rfl: 0    cetirizine (ZyrTEC) 10 mg tablet, Take 10 mg by mouth in the morning., Disp: , Rfl:     citalopram (CeleXA) 40 mg tablet, Take 1 tablet (40 mg total) by mouth in the morning, Disp: 90 tablet, Rfl: 1    citalopram (CeleXA) 40 mg tablet, Take 1 tablet (40 mg total) by mouth in the morning, Disp: 90 tablet, Rfl: 1    dicyclomine (BENTYL) 20 mg tablet, Take 1 tablet (20 mg total) by mouth 2 (two) times a day, Disp: 180 tablet, Rfl: 1    fluticasone (FLONASE) 50 mcg/act nasal " spray, 1 spray into each nostril daily, Disp: 9.9 mL, Rfl: 0    levothyroxine 137 mcg tablet, Take 1 tablet (137 mcg total) by mouth daily in the early morning, Disp: 90 tablet, Rfl: 1    levothyroxine 137 mcg tablet, Take 1 tablet (137 mcg total) by mouth daily in the early morning, Disp: 90 tablet, Rfl: 1    metFORMIN (GLUCOPHAGE-XR) 500 mg 24 hr tablet, TAKE 2 PILL IN THE MORNING AND 2 PILLS AT NIGHT., Disp: 360 tablet, Rfl: 1    naltrexone (REVIA) 50 mg tablet, Take 1/2 tablet a day for 1 week and then take 1 tablet daily.  Start 2 weeks after bupropion, Disp: 90 tablet, Rfl: 0    omeprazole (PriLOSEC) 20 mg delayed release capsule, Take 1 capsule (20 mg total) by mouth daily, Disp: 90 capsule, Rfl: 1    predniSONE 20 mg tablet, Take 2 tablets (40 mg total) by mouth daily for 4 days, Disp: 8 tablet, Rfl: 0    tirzepatide (Zepbound) 2.5 mg/0.5 mL auto-injector, Inject 0.5 mL (2.5 mg total) under the skin once a week for 28 days, Disp: 2 mL, Rfl: 0  [2]   Past Medical History:  Diagnosis Date    Allergic     Anxiety     BMI 45.0-49.9, adult (HCC)     Disease of thyroid gland     Headache(784.0)     Herniated disc, cervical     Hypertension    [3]   Past Surgical History:  Procedure Laterality Date    BREAST SURGERY      reduction    EYE SURGERY  4/2015    Lasik    TONSILLECTOMY     [4]   Family History  Problem Relation Name Age of Onset    Hyperlipidemia Mother Kendra Cui     Hypertension Mother Kendra Cui     Asthma Mother Kendra Cui     Hyperlipidemia Father Jaleel Cui     Hypertension Father Jaelel Cui     Hyperlipidemia Brother      Ovarian cancer Maternal Grandmother Chanelle Dodd     Cancer Maternal Grandmother Chanelle Dodd     Hyperlipidemia Maternal Grandfather Armando Dodd     Hypertension Maternal Grandfather Armando Dodd     Diabetes Maternal Grandfather Armando Dodd     Heart failure Paternal Grandmother Jaleel Basilia     Diabetes Paternal Grandmother  Jaleel Cui     Hypertension Paternal Grandfather Jaleel Cui     Diabetes Paternal Grandfather Jaleel Cui     Breast cancer Neg Hx      Colon cancer Neg Hx

## 2025-06-20 DIAGNOSIS — Z30.011 ENCOUNTER FOR INITIAL PRESCRIPTION OF CONTRACEPTIVE PILLS: Primary | ICD-10-CM

## 2025-06-20 RX ORDER — NORETHINDRONE ACETATE AND ETHINYL ESTRADIOL AND FERROUS FUMARATE 1MG-20(24)
1 KIT ORAL DAILY
Qty: 90 TABLET | Refills: 0 | Status: SHIPPED | OUTPATIENT
Start: 2025-06-20

## 2025-07-10 ENCOUNTER — TELEPHONE (OUTPATIENT)
Dept: BARIATRICS | Facility: CLINIC | Age: 40
End: 2025-07-10

## 2025-07-10 NOTE — TELEPHONE ENCOUNTER
Appeal Letter    To Whom It May Concern,    I am writing to request for approval for my patient, Roman Rangel, for Zepbound 5mg (tirzepatide) due to medically urgent, life-threatening obesity-related conditions which include hypothyroidism, GERD, prediabetes, PCOS . The patient meets all FDA criteria: BMI over 40 awith multiple weight-related comorbidities as listed above       Zepbound is not an optional therapy--it is the standard of care for individuals facing chronic obesity related comorbidities and its delay is directly linked to worsening cardiovascular disease, insulin resistance, sleep apnea, and all-cause mortality. This patient has already exhausted first-line interventions including lifestyle therapy, dietary counseling, and pharmacological options with either insufficient response or intolerable side effects. She has been following with weight management since 2020.     Denying this therapy without a appropriate medical counter-reason violates basic principles of evidence-based care, places the patient at risk, and contradicts treatment guidelines from the NADINE.     I have been asked about calorie deficits from the appeal center but this is not a matter of calorie deficits as this is not a metabolic issue given her consistent with exercise, diet. Please keep in mind her history of hypothyroidism and PCOS as it can play a role in slowing metabolic rate.     Please call the office if you have any questions.     Sincerely,    Dheeraj Ventura MD   Board-Certified in Obesity Medicine and Family Medicine

## 2025-07-27 DIAGNOSIS — R73.03 PREDIABETES: ICD-10-CM

## 2025-07-27 DIAGNOSIS — E66.813 CLASS 3 OBESITY: ICD-10-CM

## 2025-07-28 RX ORDER — NALTREXONE HYDROCHLORIDE 50 MG/1
TABLET, FILM COATED ORAL
Qty: 90 TABLET | Refills: 3 | Status: SHIPPED | OUTPATIENT
Start: 2025-07-28

## 2025-07-28 RX ORDER — BUPROPION HYDROCHLORIDE 150 MG/1
150 TABLET ORAL DAILY
Qty: 90 TABLET | Refills: 0 | Status: SHIPPED | OUTPATIENT
Start: 2025-07-28

## 2025-08-01 ENCOUNTER — APPOINTMENT (OUTPATIENT)
Dept: LAB | Age: 40
End: 2025-08-01
Payer: COMMERCIAL

## 2025-08-01 ENCOUNTER — OFFICE VISIT (OUTPATIENT)
Dept: FAMILY MEDICINE CLINIC | Facility: CLINIC | Age: 40
End: 2025-08-01
Payer: COMMERCIAL

## 2025-08-01 VITALS
HEIGHT: 65 IN | DIASTOLIC BLOOD PRESSURE: 100 MMHG | OXYGEN SATURATION: 97 % | SYSTOLIC BLOOD PRESSURE: 148 MMHG | WEIGHT: 263.4 LBS | HEART RATE: 81 BPM | TEMPERATURE: 97.5 F | BODY MASS INDEX: 43.89 KG/M2

## 2025-08-01 DIAGNOSIS — F41.9 ANXIETY: ICD-10-CM

## 2025-08-01 DIAGNOSIS — J32.0 CHRONIC MAXILLARY SINUSITIS: ICD-10-CM

## 2025-08-01 DIAGNOSIS — N93.9 ABNORMAL UTERINE BLEEDING (AUB): ICD-10-CM

## 2025-08-01 DIAGNOSIS — E03.8 OTHER SPECIFIED HYPOTHYROIDISM: ICD-10-CM

## 2025-08-01 DIAGNOSIS — R73.03 PREDIABETES: ICD-10-CM

## 2025-08-01 DIAGNOSIS — Z00.00 ANNUAL PHYSICAL EXAM: Primary | ICD-10-CM

## 2025-08-01 LAB — TSH SERPL DL<=0.05 MIU/L-ACNC: 1.65 UIU/ML (ref 0.45–4.5)

## 2025-08-01 PROCEDURE — 36415 COLL VENOUS BLD VENIPUNCTURE: CPT

## 2025-08-01 PROCEDURE — 99395 PREV VISIT EST AGE 18-39: CPT

## 2025-08-01 PROCEDURE — 84443 ASSAY THYROID STIM HORMONE: CPT

## 2025-08-01 RX ORDER — MONTELUKAST SODIUM 10 MG/1
10 TABLET ORAL
Qty: 90 TABLET | Refills: 3 | Status: SHIPPED | OUTPATIENT
Start: 2025-08-01

## 2025-08-01 RX ORDER — CITALOPRAM HYDROBROMIDE 40 MG/1
40 TABLET ORAL DAILY
Qty: 90 TABLET | Refills: 3 | Status: SHIPPED | OUTPATIENT
Start: 2025-08-01

## 2025-08-05 DIAGNOSIS — E03.8 OTHER SPECIFIED HYPOTHYROIDISM: ICD-10-CM

## 2025-08-05 RX ORDER — LEVOTHYROXINE SODIUM 137 UG/1
137 TABLET ORAL
Qty: 90 TABLET | Refills: 1 | Status: SHIPPED | OUTPATIENT
Start: 2025-08-05

## 2025-08-11 ENCOUNTER — TELEPHONE (OUTPATIENT)
Dept: BARIATRICS | Facility: CLINIC | Age: 40
End: 2025-08-11

## 2025-08-17 DIAGNOSIS — R10.9 ABDOMINAL PAIN: ICD-10-CM

## 2025-08-19 RX ORDER — OMEPRAZOLE 20 MG/1
20 CAPSULE, DELAYED RELEASE ORAL DAILY
Qty: 90 CAPSULE | Refills: 1 | Status: SHIPPED | OUTPATIENT
Start: 2025-08-19

## 2025-08-21 DIAGNOSIS — Z30.011 ENCOUNTER FOR INITIAL PRESCRIPTION OF CONTRACEPTIVE PILLS: ICD-10-CM

## 2025-08-22 RX ORDER — NORETHINDRONE ACETATE AND ETHINYL ESTRADIOL AND FERROUS FUMARATE 1MG-20(24)
1 KIT ORAL DAILY
Qty: 90 TABLET | Refills: 1 | Status: SHIPPED | OUTPATIENT
Start: 2025-08-22